# Patient Record
Sex: FEMALE | Race: WHITE | Employment: OTHER | URBAN - METROPOLITAN AREA
[De-identification: names, ages, dates, MRNs, and addresses within clinical notes are randomized per-mention and may not be internally consistent; named-entity substitution may affect disease eponyms.]

---

## 2017-01-05 PROBLEM — E11.9 DIABETES MELLITUS TYPE 2 WITHOUT RETINOPATHY (HCC): Chronic | Status: ACTIVE | Noted: 2017-01-05

## 2018-02-08 PROBLEM — H25.13 AGE-RELATED NUCLEAR CATARACT OF BOTH EYES: Chronic | Status: ACTIVE | Noted: 2018-02-08

## 2018-06-14 ENCOUNTER — HOSPITAL ENCOUNTER (OUTPATIENT)
Age: 68
Discharge: HOME | End: 2018-06-14
Payer: COMMERCIAL

## 2018-06-14 DIAGNOSIS — E11.9 TYPE 2 DIABETES MELLITUS WITHOUT COMPLICATION, WITHOUT LONG-TERM CURRENT USE OF INSULIN (HCC): ICD-10-CM

## 2018-06-14 LAB
ALBUMIN SERPL-MCNC: 4.3 G/DL (ref 3.5–5.2)
ALP BLD-CCNC: 65 U/L (ref 35–104)
ALT SERPL-CCNC: 15 U/L (ref 0–32)
ANION GAP SERPL CALCULATED.3IONS-SCNC: 13 MMOL/L (ref 7–16)
AST SERPL-CCNC: 19 U/L (ref 0–31)
BASOPHILS ABSOLUTE: 0.06 E9/L (ref 0–0.2)
BASOPHILS RELATIVE PERCENT: 1.2 % (ref 0–2)
BILIRUB SERPL-MCNC: 0.3 MG/DL (ref 0–1.2)
BUN BLDV-MCNC: 16 MG/DL (ref 8–23)
CALCIUM SERPL-MCNC: 9.9 MG/DL (ref 8.6–10.2)
CHLORIDE BLD-SCNC: 103 MMOL/L (ref 98–107)
CHOLESTEROL, TOTAL: 200 MG/DL (ref 0–199)
CO2: 26 MMOL/L (ref 22–29)
CREAT SERPL-MCNC: 1 MG/DL (ref 0.5–1)
CREATININE URINE: 127 MG/DL (ref 29–226)
EOSINOPHILS ABSOLUTE: 0.41 E9/L (ref 0.05–0.5)
EOSINOPHILS RELATIVE PERCENT: 8.2 % (ref 0–6)
GFR AFRICAN AMERICAN: >60
GFR NON-AFRICAN AMERICAN: 55 ML/MIN/1.73
GLUCOSE BLD-MCNC: 103 MG/DL (ref 74–109)
HBA1C MFR BLD: 6.2 % (ref 4.8–5.9)
HCT VFR BLD CALC: 41.9 % (ref 34–48)
HDLC SERPL-MCNC: 73 MG/DL
HEMOGLOBIN: 14.1 G/DL (ref 11.5–15.5)
IMMATURE GRANULOCYTES #: 0.01 E9/L
IMMATURE GRANULOCYTES %: 0.2 % (ref 0–5)
LDL CHOLESTEROL CALCULATED: 112 MG/DL (ref 0–99)
LYMPHOCYTES ABSOLUTE: 1.78 E9/L (ref 1.5–4)
LYMPHOCYTES RELATIVE PERCENT: 35.4 % (ref 20–42)
MCH RBC QN AUTO: 30.1 PG (ref 26–35)
MCHC RBC AUTO-ENTMCNC: 33.7 % (ref 32–34.5)
MCV RBC AUTO: 89.5 FL (ref 80–99.9)
MICROALBUMIN UR-MCNC: <12 MG/L
MICROALBUMIN/CREAT UR-RTO: ABNORMAL (ref 0–30)
MONOCYTES ABSOLUTE: 0.38 E9/L (ref 0.1–0.95)
MONOCYTES RELATIVE PERCENT: 7.6 % (ref 2–12)
NEUTROPHILS ABSOLUTE: 2.39 E9/L (ref 1.8–7.3)
NEUTROPHILS RELATIVE PERCENT: 47.4 % (ref 43–80)
PDW BLD-RTO: 12.4 FL (ref 11.5–15)
PLATELET # BLD: 309 E9/L (ref 130–450)
PMV BLD AUTO: 9.9 FL (ref 7–12)
POTASSIUM SERPL-SCNC: 4.5 MMOL/L (ref 3.5–5)
RBC # BLD: 4.68 E12/L (ref 3.5–5.5)
SODIUM BLD-SCNC: 142 MMOL/L (ref 132–146)
T4 FREE: 1.6 NG/DL (ref 0.93–1.7)
TOTAL PROTEIN: 6.9 G/DL (ref 6.4–8.3)
TRIGL SERPL-MCNC: 74 MG/DL (ref 0–149)
TSH SERPL DL<=0.05 MIU/L-ACNC: 3.9 UIU/ML (ref 0.27–4.2)
VLDLC SERPL CALC-MCNC: 15 MG/DL
WBC # BLD: 5 E9/L (ref 4.5–11.5)

## 2018-06-14 PROCEDURE — 82570 ASSAY OF URINE CREATININE: CPT

## 2018-06-14 PROCEDURE — 84439 ASSAY OF FREE THYROXINE: CPT

## 2018-06-14 PROCEDURE — 80061 LIPID PANEL: CPT

## 2018-06-14 PROCEDURE — 82044 UR ALBUMIN SEMIQUANTITATIVE: CPT

## 2018-06-14 PROCEDURE — 83036 HEMOGLOBIN GLYCOSYLATED A1C: CPT

## 2018-06-14 PROCEDURE — 84443 ASSAY THYROID STIM HORMONE: CPT

## 2018-06-14 PROCEDURE — 80053 COMPREHEN METABOLIC PANEL: CPT

## 2018-06-14 PROCEDURE — 85025 COMPLETE CBC W/AUTO DIFF WBC: CPT

## 2018-06-14 PROCEDURE — 36415 COLL VENOUS BLD VENIPUNCTURE: CPT

## 2018-09-27 ENCOUNTER — HOSPITAL ENCOUNTER (OUTPATIENT)
Dept: PHYSICAL THERAPY | Age: 68
Setting detail: THERAPIES SERIES
Discharge: HOME | End: 2018-09-27
Payer: COMMERCIAL

## 2018-09-27 PROCEDURE — 97161 PT EVAL LOW COMPLEX 20 MIN: CPT

## 2018-09-27 PROCEDURE — G8982 BODY POS GOAL STATUS: HCPCS

## 2018-09-27 PROCEDURE — G8981 BODY POS CURRENT STATUS: HCPCS

## 2018-09-27 PROCEDURE — G0283 ELEC STIM OTHER THAN WOUND: HCPCS

## 2018-09-27 NOTE — PROGRESS NOTES
Capsular pattern   AROM LUE (degrees)  LUE General AROM: Shoulder flexion 150* Abd 130*   Joint Mobility  ROM LUE: General restriction of GH and scapular joint mobility     Strength RUE  Comment: 4 yto 4+/5   Strength LUE  Comment: 4 to 4+/5   Tone RLE  RLE Tone: Normotonic  Tone LLE  LLE Tone: Normotonic  Motor Control  Gross Motor?: WFL                                           Assessment   Conditions Requiring Skilled Therapeutic Intervention  Body structures, Functions, Activity limitations: Decreased functional mobility ; Decreased ROM  Assessment: Acute left shoulder pain with restricted mobility Pain appears consistent with Impingement type problem, most likely Tendonitis. Prognosis: Fair;Good  Decision Making: Low Complexity         Plan   Plan  Times per week: 2-3  Plan weeks: 5  Current Treatment Recommendations: Strengthening, Home Exercise Program, Manual Therapy - Soft Tissue Mobilization, ROM, Functional Mobility Training, Modalities    G-Code  PT G-Codes  Functional Limitation: Changing and maintaining body position  Changing and Maintaining Body Position Current Status (): At least 20 percent but less than 40 percent impaired, limited or restricted  Changing and Maintaining Body Position Goal Status ():  At least 1 percent but less than 20 percent impaired, limited or restricted    OutComes Score                                           Goals          Therapy Time   Individual Concurrent Group Co-treatment   Time In 0930         Time Out 1030         Minutes 60         Timed Code Treatment Minutes: [address], PT

## 2018-09-28 ENCOUNTER — HOSPITAL ENCOUNTER (OUTPATIENT)
Dept: PHYSICAL THERAPY | Age: 68
Setting detail: THERAPIES SERIES
Discharge: HOME | End: 2018-09-28
Payer: COMMERCIAL

## 2018-09-28 PROCEDURE — 97035 APP MDLTY 1+ULTRASOUND EA 15: CPT

## 2018-09-28 PROCEDURE — G0283 ELEC STIM OTHER THAN WOUND: HCPCS

## 2018-10-05 ENCOUNTER — HOSPITAL ENCOUNTER (OUTPATIENT)
Dept: PHYSICAL THERAPY | Age: 68
Setting detail: THERAPIES SERIES
Discharge: HOME | End: 2018-10-05
Payer: COMMERCIAL

## 2018-10-05 PROCEDURE — G0283 ELEC STIM OTHER THAN WOUND: HCPCS

## 2018-10-05 PROCEDURE — 97035 APP MDLTY 1+ULTRASOUND EA 15: CPT

## 2018-10-08 ENCOUNTER — HOSPITAL ENCOUNTER (OUTPATIENT)
Dept: PHYSICAL THERAPY | Age: 68
Setting detail: THERAPIES SERIES
Discharge: HOME | End: 2018-10-08
Payer: COMMERCIAL

## 2018-10-08 PROCEDURE — 97110 THERAPEUTIC EXERCISES: CPT

## 2018-10-08 PROCEDURE — 97035 APP MDLTY 1+ULTRASOUND EA 15: CPT

## 2018-10-08 PROCEDURE — G0283 ELEC STIM OTHER THAN WOUND: HCPCS

## 2018-10-09 ENCOUNTER — HOSPITAL ENCOUNTER (OUTPATIENT)
Dept: PHYSICAL THERAPY | Age: 68
Setting detail: THERAPIES SERIES
Discharge: HOME | End: 2018-10-09
Payer: COMMERCIAL

## 2018-10-09 PROCEDURE — 97035 APP MDLTY 1+ULTRASOUND EA 15: CPT

## 2018-10-09 PROCEDURE — G0283 ELEC STIM OTHER THAN WOUND: HCPCS

## 2018-10-09 PROCEDURE — 97110 THERAPEUTIC EXERCISES: CPT

## 2018-10-15 ENCOUNTER — HOSPITAL ENCOUNTER (OUTPATIENT)
Dept: PHYSICAL THERAPY | Age: 68
Setting detail: THERAPIES SERIES
Discharge: HOME | End: 2018-10-15
Payer: COMMERCIAL

## 2018-10-15 PROCEDURE — G0283 ELEC STIM OTHER THAN WOUND: HCPCS

## 2018-10-15 PROCEDURE — 97033 APP MDLTY 1+IONTPHRSIS EA 15: CPT

## 2018-10-18 ENCOUNTER — HOSPITAL ENCOUNTER (OUTPATIENT)
Dept: PHYSICAL THERAPY | Age: 68
Setting detail: THERAPIES SERIES
Discharge: HOME | End: 2018-10-18
Payer: COMMERCIAL

## 2018-10-18 PROCEDURE — 97110 THERAPEUTIC EXERCISES: CPT

## 2018-10-18 PROCEDURE — G0283 ELEC STIM OTHER THAN WOUND: HCPCS

## 2018-10-23 ENCOUNTER — HOSPITAL ENCOUNTER (OUTPATIENT)
Dept: PHYSICAL THERAPY | Age: 68
Setting detail: THERAPIES SERIES
Discharge: HOME | End: 2018-10-23
Payer: COMMERCIAL

## 2018-10-23 PROCEDURE — G0283 ELEC STIM OTHER THAN WOUND: HCPCS

## 2018-10-23 PROCEDURE — 97110 THERAPEUTIC EXERCISES: CPT

## 2018-10-25 ENCOUNTER — HOSPITAL ENCOUNTER (OUTPATIENT)
Dept: PHYSICAL THERAPY | Age: 68
Setting detail: THERAPIES SERIES
Discharge: HOME | End: 2018-10-25
Payer: COMMERCIAL

## 2018-10-25 PROCEDURE — 97110 THERAPEUTIC EXERCISES: CPT

## 2018-10-25 PROCEDURE — G0283 ELEC STIM OTHER THAN WOUND: HCPCS

## 2018-10-25 NOTE — PROGRESS NOTES
stretching and capsular mobilization         Electronically signed by:  Ema Mosqueda, [address] [facility]

## 2018-10-30 ENCOUNTER — HOSPITAL ENCOUNTER (OUTPATIENT)
Dept: PHYSICAL THERAPY | Age: 68
Setting detail: THERAPIES SERIES
Discharge: HOME | End: 2018-10-30
Payer: COMMERCIAL

## 2018-10-30 PROCEDURE — G8982 BODY POS GOAL STATUS: HCPCS

## 2018-10-30 PROCEDURE — G8981 BODY POS CURRENT STATUS: HCPCS

## 2018-10-30 PROCEDURE — 97110 THERAPEUTIC EXERCISES: CPT

## 2018-10-30 PROCEDURE — G0283 ELEC STIM OTHER THAN WOUND: HCPCS

## 2018-11-01 ENCOUNTER — HOSPITAL ENCOUNTER (OUTPATIENT)
Dept: PHYSICAL THERAPY | Age: 68
Setting detail: THERAPIES SERIES
Discharge: HOME | End: 2018-11-01
Payer: COMMERCIAL

## 2018-11-01 PROCEDURE — G0283 ELEC STIM OTHER THAN WOUND: HCPCS

## 2018-11-01 PROCEDURE — 97110 THERAPEUTIC EXERCISES: CPT

## 2018-11-06 ENCOUNTER — HOSPITAL ENCOUNTER (OUTPATIENT)
Dept: PHYSICAL THERAPY | Age: 68
Setting detail: THERAPIES SERIES
Discharge: HOME | End: 2018-11-06
Payer: COMMERCIAL

## 2018-11-06 PROCEDURE — G0283 ELEC STIM OTHER THAN WOUND: HCPCS

## 2018-11-06 PROCEDURE — 97110 THERAPEUTIC EXERCISES: CPT

## 2018-11-08 ENCOUNTER — APPOINTMENT (OUTPATIENT)
Dept: PHYSICAL THERAPY | Age: 68
End: 2018-11-08
Payer: COMMERCIAL

## 2018-11-09 ENCOUNTER — HOSPITAL ENCOUNTER (OUTPATIENT)
Dept: PHYSICAL THERAPY | Age: 68
Setting detail: THERAPIES SERIES
End: 2018-11-09
Payer: COMMERCIAL

## 2018-11-12 ENCOUNTER — HOSPITAL ENCOUNTER (OUTPATIENT)
Dept: PHYSICAL THERAPY | Age: 68
Setting detail: THERAPIES SERIES
Discharge: HOME | End: 2018-11-12
Payer: COMMERCIAL

## 2018-11-12 PROCEDURE — 97110 THERAPEUTIC EXERCISES: CPT

## 2018-11-12 PROCEDURE — G0283 ELEC STIM OTHER THAN WOUND: HCPCS

## 2018-11-14 ENCOUNTER — HOSPITAL ENCOUNTER (OUTPATIENT)
Dept: PHYSICAL THERAPY | Age: 68
Setting detail: THERAPIES SERIES
Discharge: HOME | End: 2018-11-14
Payer: COMMERCIAL

## 2018-11-14 PROCEDURE — 97110 THERAPEUTIC EXERCISES: CPT

## 2018-11-14 PROCEDURE — G0283 ELEC STIM OTHER THAN WOUND: HCPCS

## 2018-11-14 NOTE — PROGRESS NOTES
[facility]  Phone: [phone] Fax: [phone]       Physical Therapy Daily Treatment Note  Date:  2018    Patient Name:  Brielle Villasenor    :  1950  MRN: [id number]    Restrictions/Precautions:    Diagnosis:  Left Shoulder Pain   Treatment Diagnosis:    Insurance/Certification information:  [employer]   Referring Physician:  Dr Page Quinonez of care signed (Y/N):    Visit# / total visits:  14/  Pain level: /10  Time In: 930     Time Out: 1030         Subjective:     Exercises:  Exercise/Equipment Resistance/Repetitions Other comments     UBE 8 min F/B           Prayer stretch with ball   10 reps      Modified doorway stretch   5 reps      Supine Thoracic/capsule stretch   3 min       Flex Band Horizontal adduction stretch  5 reps                                                                                                     Other Therapeutic Activities:      Home Exercise Program:      Manual Treatments: [facility] Joint ROM/Mobilization  Emphasis on posterior capsule     Modalities:                         Interferential Estim 20 min with heat              Timed Code Treatment Minutes: 30    Total Treatment Minutes: 40    Treatment/Activity Tolerance:  [x] Patient tolerated treatment well [] Patient limited by fatigue  [] Patient limited by pain  [] Patient limited by other medical complications  [x] Other: Improved GH mobility with less acute pain post injection     Prognosis: [x] Good [x] Fair  [] Poor    Patient Requires Follow-up: [x] Yes  [] No    Plan:   [x] Continue per plan of care [] Alter current plan (see comments)  [] Plan of care initiated [] Hold pending MD visit [] Discharge  Plan for Next Session:        Electronically signed by:  Matt Reeves, [address] [facility]

## 2018-11-19 ENCOUNTER — HOSPITAL ENCOUNTER (OUTPATIENT)
Dept: PHYSICAL THERAPY | Age: 68
Setting detail: THERAPIES SERIES
Discharge: HOME | End: 2018-11-19
Payer: COMMERCIAL

## 2018-11-19 PROCEDURE — 97110 THERAPEUTIC EXERCISES: CPT

## 2018-11-19 PROCEDURE — G0283 ELEC STIM OTHER THAN WOUND: HCPCS

## 2018-11-27 ENCOUNTER — HOSPITAL ENCOUNTER (OUTPATIENT)
Dept: PHYSICAL THERAPY | Age: 68
Setting detail: THERAPIES SERIES
Discharge: HOME | End: 2018-11-27
Payer: COMMERCIAL

## 2018-11-27 PROCEDURE — 97110 THERAPEUTIC EXERCISES: CPT

## 2018-11-27 PROCEDURE — G0283 ELEC STIM OTHER THAN WOUND: HCPCS

## 2018-11-30 ENCOUNTER — HOSPITAL ENCOUNTER (OUTPATIENT)
Dept: PHYSICAL THERAPY | Age: 68
Setting detail: THERAPIES SERIES
Discharge: HOME | End: 2018-11-30
Payer: COMMERCIAL

## 2018-11-30 PROCEDURE — G0283 ELEC STIM OTHER THAN WOUND: HCPCS

## 2018-11-30 PROCEDURE — 97110 THERAPEUTIC EXERCISES: CPT

## 2018-11-30 NOTE — PROGRESS NOTES
[facility]  Phone: [phone] Fax: [phone]       Physical Therapy Daily Treatment Note  Date:  2018    Patient Name:  Stu Li    :  1950  MRN: [id number]    Restrictions/Precautions:    Diagnosis:  Left Shoulder Pain   Treatment Diagnosis:    Insurance/Certification information:  [facility]   Referring Physician:  Dr Ela Delgadillo of care signed (Y/N):    Visit# / total visits:  16/  Pain level: /10  Time In: 0800     Time Out: 0850         Subjective:     Exercises:  Exercise/Equipment Resistance/Repetitions Other comments     UBE 8 min F/B           Prayer stretch with ball   10 reps      Modified doorway stretch   5 reps      Supine Thoracic/capsule stretch   3 min       Flex Band Horizontal adduction stretch  5 reps             T band pull down   Blue  10 reps       Upright row 10 reps       Wall Push up   10 reps       Towel stretch for IR  10 reps                                                               Other Therapeutic Activities:      Home Exercise Program:      Manual Treatments: [address] Joint ROM/Mobilization  Emphasis on posterior capsule     Modalities:                         Interferential Estim 20 min with heat              Timed Code Treatment Minutes: 30    Total Treatment Minutes: 40    Treatment/Activity Tolerance:  [x] Patient tolerated treatment well [] Patient limited by fatigue  [] Patient limited by pain  [] Patient limited by other medical complications  [x] Other    Prognosis: [x] Good [x] Fair  [] Poor    Patient Requires Follow-up: [x] Yes  [] No    Plan:   [x] Continue per plan of care [] Alter current plan (see comments)  [] Plan of care initiated [] Hold pending MD visit [] Discharge  Plan for Next Session:        Electronically signed by:  Karen Toscano, 311 [facility]

## 2018-12-05 ENCOUNTER — HOSPITAL ENCOUNTER (OUTPATIENT)
Dept: PHYSICAL THERAPY | Age: 68
Setting detail: THERAPIES SERIES
Discharge: HOME | End: 2018-12-05
Payer: COMMERCIAL

## 2018-12-05 PROCEDURE — 97110 THERAPEUTIC EXERCISES: CPT

## 2018-12-05 PROCEDURE — G0283 ELEC STIM OTHER THAN WOUND: HCPCS

## 2018-12-07 ENCOUNTER — HOSPITAL ENCOUNTER (OUTPATIENT)
Dept: PHYSICAL THERAPY | Age: 68
Setting detail: THERAPIES SERIES
Discharge: HOME | End: 2018-12-07
Payer: COMMERCIAL

## 2018-12-07 PROCEDURE — G0283 ELEC STIM OTHER THAN WOUND: HCPCS

## 2018-12-07 PROCEDURE — 97110 THERAPEUTIC EXERCISES: CPT

## 2018-12-10 ENCOUNTER — HOSPITAL ENCOUNTER (OUTPATIENT)
Dept: PHYSICAL THERAPY | Age: 68
Setting detail: THERAPIES SERIES
Discharge: HOME | End: 2018-12-10
Payer: COMMERCIAL

## 2018-12-10 PROCEDURE — 97110 THERAPEUTIC EXERCISES: CPT

## 2018-12-10 PROCEDURE — G0283 ELEC STIM OTHER THAN WOUND: HCPCS

## 2018-12-12 ENCOUNTER — HOSPITAL ENCOUNTER (OUTPATIENT)
Dept: PHYSICAL THERAPY | Age: 68
Setting detail: THERAPIES SERIES
Discharge: HOME | End: 2018-12-12
Payer: COMMERCIAL

## 2018-12-12 PROCEDURE — G8981 BODY POS CURRENT STATUS: HCPCS

## 2018-12-12 PROCEDURE — 97110 THERAPEUTIC EXERCISES: CPT

## 2018-12-12 PROCEDURE — G0283 ELEC STIM OTHER THAN WOUND: HCPCS

## 2018-12-12 PROCEDURE — G8982 BODY POS GOAL STATUS: HCPCS

## 2018-12-12 PROCEDURE — G8983 BODY POS D/C STATUS: HCPCS

## 2018-12-27 ENCOUNTER — HOSPITAL ENCOUNTER (OUTPATIENT)
Age: 68
Discharge: HOME | End: 2018-12-27
Payer: COMMERCIAL

## 2018-12-27 LAB
CHOLESTEROL, TOTAL: 207 MG/DL (ref 0–199)
HBA1C MFR BLD: 6.1 % (ref 4–5.6)
HDLC SERPL-MCNC: 82 MG/DL
LDL CHOLESTEROL CALCULATED: 104 MG/DL (ref 0–99)
TRIGL SERPL-MCNC: 106 MG/DL (ref 0–149)
VLDLC SERPL CALC-MCNC: 21 MG/DL

## 2018-12-27 PROCEDURE — 36415 COLL VENOUS BLD VENIPUNCTURE: CPT

## 2018-12-27 PROCEDURE — 83036 HEMOGLOBIN GLYCOSYLATED A1C: CPT

## 2018-12-27 PROCEDURE — 80061 LIPID PANEL: CPT

## 2019-01-25 ENCOUNTER — OFFICE VISIT (OUTPATIENT)
Dept: FAMILY MEDICINE CLINIC | Age: 69
End: 2019-01-25
Payer: MEDICARE

## 2019-01-25 VITALS
HEART RATE: 64 BPM | TEMPERATURE: 97 F | HEIGHT: 67 IN | OXYGEN SATURATION: 98 % | SYSTOLIC BLOOD PRESSURE: 130 MMHG | DIASTOLIC BLOOD PRESSURE: 76 MMHG | RESPIRATION RATE: 16 BRPM | WEIGHT: 152 LBS | BODY MASS INDEX: 23.86 KG/M2

## 2019-01-25 DIAGNOSIS — K59.00 CONSTIPATION, UNSPECIFIED CONSTIPATION TYPE: ICD-10-CM

## 2019-01-25 DIAGNOSIS — E11.9 TYPE 2 DIABETES MELLITUS WITHOUT COMPLICATION, WITHOUT LONG-TERM CURRENT USE OF INSULIN (HCC): ICD-10-CM

## 2019-01-25 DIAGNOSIS — Z12.31 SCREENING MAMMOGRAM, ENCOUNTER FOR: ICD-10-CM

## 2019-01-25 DIAGNOSIS — E03.8 HYPOTHYROIDISM DUE TO HASHIMOTO'S THYROIDITIS: Primary | ICD-10-CM

## 2019-01-25 DIAGNOSIS — E78.5 HYPERLIPIDEMIA, UNSPECIFIED HYPERLIPIDEMIA TYPE: ICD-10-CM

## 2019-01-25 DIAGNOSIS — E06.3 HYPOTHYROIDISM DUE TO HASHIMOTO'S THYROIDITIS: Primary | ICD-10-CM

## 2019-01-25 PROCEDURE — 99203 OFFICE O/P NEW LOW 30 MIN: CPT | Performed by: FAMILY MEDICINE

## 2019-01-25 RX ORDER — POLYETHYLENE GLYCOL 3350 17 G/17G
17 POWDER, FOR SOLUTION ORAL PRN
COMMUNITY
End: 2019-08-19

## 2019-01-25 ASSESSMENT — ENCOUNTER SYMPTOMS
SORE THROAT: 0
BACK PAIN: 0
SHORTNESS OF BREATH: 0
SINUS PRESSURE: 0
CONSTIPATION: 0
EYE PAIN: 0
DIARRHEA: 0
ABDOMINAL PAIN: 0
COUGH: 0

## 2019-01-25 ASSESSMENT — PATIENT HEALTH QUESTIONNAIRE - PHQ9
SUM OF ALL RESPONSES TO PHQ9 QUESTIONS 1 & 2: 0
SUM OF ALL RESPONSES TO PHQ QUESTIONS 1-9: 0
2. FEELING DOWN, DEPRESSED OR HOPELESS: 0
1. LITTLE INTEREST OR PLEASURE IN DOING THINGS: 0
SUM OF ALL RESPONSES TO PHQ QUESTIONS 1-9: 0

## 2019-02-14 PROBLEM — H52.4 MYOPIA OF BOTH EYES WITH ASTIGMATISM AND PRESBYOPIA: Status: ACTIVE | Noted: 2019-02-14

## 2019-02-14 PROBLEM — H35.372 EPIRETINAL MEMBRANE (ERM) OF LEFT EYE: Status: ACTIVE | Noted: 2019-02-14

## 2019-02-14 PROBLEM — H52.13 MYOPIA OF BOTH EYES WITH ASTIGMATISM AND PRESBYOPIA: Status: ACTIVE | Noted: 2019-02-14

## 2019-02-14 PROBLEM — H52.203 MYOPIA OF BOTH EYES WITH ASTIGMATISM AND PRESBYOPIA: Status: ACTIVE | Noted: 2019-02-14

## 2019-07-03 ENCOUNTER — HOSPITAL ENCOUNTER (OUTPATIENT)
Age: 69
Discharge: HOME | End: 2019-07-03
Payer: MEDICARE

## 2019-07-03 DIAGNOSIS — E03.9 PRIMARY HYPOTHYROIDISM: ICD-10-CM

## 2019-07-03 DIAGNOSIS — E11.9 TYPE 2 DIABETES MELLITUS WITHOUT COMPLICATION, WITHOUT LONG-TERM CURRENT USE OF INSULIN (HCC): ICD-10-CM

## 2019-07-03 LAB
ALBUMIN SERPL-MCNC: 4.3 G/DL (ref 3.5–5.2)
ALP BLD-CCNC: 66 U/L (ref 35–104)
ALT SERPL-CCNC: 17 U/L (ref 0–32)
ANION GAP SERPL CALCULATED.3IONS-SCNC: 10 MMOL/L (ref 7–16)
AST SERPL-CCNC: 23 U/L (ref 0–31)
BILIRUB SERPL-MCNC: 0.2 MG/DL (ref 0–1.2)
BUN BLDV-MCNC: 13 MG/DL (ref 8–23)
CALCIUM SERPL-MCNC: 9.4 MG/DL (ref 8.6–10.2)
CHLORIDE BLD-SCNC: 105 MMOL/L (ref 98–107)
CHOLESTEROL, TOTAL: 217 MG/DL (ref 0–199)
CO2: 28 MMOL/L (ref 22–29)
CREAT SERPL-MCNC: 1 MG/DL (ref 0.5–1)
CREATININE URINE: 92 MG/DL (ref 29–226)
GFR AFRICAN AMERICAN: >60
GFR NON-AFRICAN AMERICAN: 55 ML/MIN/1.73
GLUCOSE BLD-MCNC: 111 MG/DL (ref 74–99)
HBA1C MFR BLD: 6.2 % (ref 4–5.6)
HDLC SERPL-MCNC: 73 MG/DL
LDL CHOLESTEROL CALCULATED: 122 MG/DL (ref 0–99)
MICROALBUMIN UR-MCNC: <12 MG/L
MICROALBUMIN/CREAT UR-RTO: ABNORMAL (ref 0–30)
POTASSIUM SERPL-SCNC: 5 MMOL/L (ref 3.5–5)
SODIUM BLD-SCNC: 143 MMOL/L (ref 132–146)
T4 FREE: 1.41 NG/DL (ref 0.93–1.7)
TOTAL PROTEIN: 6.9 G/DL (ref 6.4–8.3)
TRIGL SERPL-MCNC: 111 MG/DL (ref 0–149)
TSH SERPL DL<=0.05 MIU/L-ACNC: 3.98 UIU/ML (ref 0.27–4.2)
VLDLC SERPL CALC-MCNC: 22 MG/DL

## 2019-07-03 PROCEDURE — 83036 HEMOGLOBIN GLYCOSYLATED A1C: CPT

## 2019-07-03 PROCEDURE — 82044 UR ALBUMIN SEMIQUANTITATIVE: CPT

## 2019-07-03 PROCEDURE — 80053 COMPREHEN METABOLIC PANEL: CPT

## 2019-07-03 PROCEDURE — 84443 ASSAY THYROID STIM HORMONE: CPT

## 2019-07-03 PROCEDURE — 36415 COLL VENOUS BLD VENIPUNCTURE: CPT

## 2019-07-03 PROCEDURE — 82570 ASSAY OF URINE CREATININE: CPT

## 2019-07-03 PROCEDURE — 84439 ASSAY OF FREE THYROXINE: CPT

## 2019-07-03 PROCEDURE — 80061 LIPID PANEL: CPT

## 2019-07-11 PROBLEM — E78.5 DYSLIPIDEMIA: Status: ACTIVE | Noted: 2019-07-11

## 2019-07-25 ENCOUNTER — OFFICE VISIT (OUTPATIENT)
Dept: FAMILY MEDICINE CLINIC | Age: 69
End: 2019-07-25
Payer: MEDICARE

## 2019-07-25 VITALS
DIASTOLIC BLOOD PRESSURE: 70 MMHG | RESPIRATION RATE: 16 BRPM | SYSTOLIC BLOOD PRESSURE: 122 MMHG | HEART RATE: 67 BPM | WEIGHT: 150 LBS | BODY MASS INDEX: 23.49 KG/M2 | OXYGEN SATURATION: 99 %

## 2019-07-25 DIAGNOSIS — Z12.11 SCREENING FOR COLON CANCER: ICD-10-CM

## 2019-07-25 DIAGNOSIS — Z23 NEED FOR VACCINATION AGAINST STREPTOCOCCUS PNEUMONIAE: ICD-10-CM

## 2019-07-25 DIAGNOSIS — Z00.00 ROUTINE GENERAL MEDICAL EXAMINATION AT A HEALTH CARE FACILITY: Primary | ICD-10-CM

## 2019-07-25 PROCEDURE — 90732 PPSV23 VACC 2 YRS+ SUBQ/IM: CPT | Performed by: FAMILY MEDICINE

## 2019-07-25 PROCEDURE — G0009 ADMIN PNEUMOCOCCAL VACCINE: HCPCS | Performed by: FAMILY MEDICINE

## 2019-07-25 PROCEDURE — G0439 PPPS, SUBSEQ VISIT: HCPCS | Performed by: FAMILY MEDICINE

## 2019-07-25 ASSESSMENT — PATIENT HEALTH QUESTIONNAIRE - PHQ9
SUM OF ALL RESPONSES TO PHQ QUESTIONS 1-9: 0
SUM OF ALL RESPONSES TO PHQ QUESTIONS 1-9: 0

## 2019-07-25 ASSESSMENT — LIFESTYLE VARIABLES
HOW OFTEN DURING THE LAST YEAR HAVE YOU FOUND THAT YOU WERE NOT ABLE TO STOP DRINKING ONCE YOU HAD STARTED: 0
HAS A RELATIVE, FRIEND, DOCTOR, OR ANOTHER HEALTH PROFESSIONAL EXPRESSED CONCERN ABOUT YOUR DRINKING OR SUGGESTED YOU CUT DOWN: 0
HOW OFTEN DO YOU HAVE A DRINK CONTAINING ALCOHOL: 4
HOW OFTEN DURING THE LAST YEAR HAVE YOU HAD A FEELING OF GUILT OR REMORSE AFTER DRINKING: 0
HOW OFTEN DO YOU HAVE SIX OR MORE DRINKS ON ONE OCCASION: 0
AUDIT TOTAL SCORE: 4
HOW MANY STANDARD DRINKS CONTAINING ALCOHOL DO YOU HAVE ON A TYPICAL DAY: 0
AUDIT-C TOTAL SCORE: 4
HOW OFTEN DURING THE LAST YEAR HAVE YOU BEEN UNABLE TO REMEMBER WHAT HAPPENED THE NIGHT BEFORE BECAUSE YOU HAD BEEN DRINKING: 0
HAVE YOU OR SOMEONE ELSE BEEN INJURED AS A RESULT OF YOUR DRINKING: 0
HOW OFTEN DURING THE LAST YEAR HAVE YOU NEEDED AN ALCOHOLIC DRINK FIRST THING IN THE MORNING TO GET YOURSELF GOING AFTER A NIGHT OF HEAVY DRINKING: 0
HOW OFTEN DURING THE LAST YEAR HAVE YOU FAILED TO DO WHAT WAS NORMALLY EXPECTED FROM YOU BECAUSE OF DRINKING: 0

## 2019-07-25 NOTE — PROGRESS NOTES
Medicare Annual Wellness Visit  Name: Michael Funk Date: 2019   MRN: [id number] Sex: Female   Age: 76 y.o. Ethnicity: Non-/Non    : 1950 Race: Tierney Blanton is here for Medicare AWV    Screenings for behavioral, psychosocial and functional/safety risks, and cognitive dysfunction are all negative except as indicated below. These results, as well as other patient data from the [address] form, are documented in Flowsheets linked to this Encounter. No Known Allergies  Prior to Visit Medications    Medication Sig Taking? Authorizing Provider   metFORMIN (GLUCOPHAGE) 1000 MG tablet Take 1 tablet by mouth 2 times daily (with meals) Yes Dipesh Harris MD   levothyroxine (SYNTHROID) 50 MCG tablet TAKE ONE TABLET BY MOUTH ON MONDAY,TUESDAY,AND WEDNESDAY Yes Dipesh Harris MD   levothyroxine (SYNTHROID) 75 MCG tablet Take 1 tablet by mouth See Admin Instructions Thursday- Yes Dipesh Harris MD   lovastatin (MEVACOR) 40 MG tablet Take 1 tablet by mouth daily Yes Dipesh Harris MD   polyethylene glycol (GLYCOLAX) powder Take 17 g by mouth as needed Yes Historical Provider, MD   Psyllium (METAMUCIL FIBER PO) Take by mouth Yes Historical Provider, MD   blood glucose test strips (FREESTYLE LITE) strip 1 each by In Vitro route 2 times daily As needed.  Yes Dipesh Harrsi MD   FREESTYLE LANCETS MISC 1 each by Does not apply route 2 times daily Yes Dipesh Harris MD   Lancet Device MISC 1 Device by Does not apply route once for 1 dose Yes Dipesh Harris MD   Blood Glucose Monitoring Suppl (FREESTYLE FREEDOM LITE) w/Device KIT once Yes Dipesh Harris MD   B Complex Vitamins (VITAMIN B COMPLEX PO) Take by mouth 2 times daily  Yes Historical Provider, MD   Cholecalciferol (VITAMIN D3) 1000 UNITS TABS Take by mouth daily Yes Historical Provider, MD     Past Medical History:   Diagnosis Date    Actinic keratosis     Diabetes ([facility] [address]     Hyperlipidemia     Hypothyroidism     Lichen sclerosus     Lichen sclerosus     Rotator cuff impingement syndrome     Spinal arthritis     Spinal stenosis     Vertigo      Past Surgical History:   Procedure Laterality Date    BREAST BIOPSY      Stereotactic    COLONOSCOPY      DILATION AND CURETTAGE OF UTERUS      HYSTERECTOMY      THYROIDECTOMY, PARTIAL  01/2008    TUBAL LIGATION       Family History   Problem Relation Age of Onset    Cancer Mother         pancreatic    Diabetes Father     Cancer Father         Prostate    Heart Failure Father     Stroke Father         due to medical intervention    Cancer Sister         Breast CA x2    Kidney Disease Maternal Grandmother     Heart Disease Maternal Grandfather     Heart Attack Maternal Grandfather     Cancer Paternal Grandmother     Heart Failure Paternal Grandfather     Other Other         No [facility]       CareTeam (Including outside providers/suppliers regularly involved in providing care):   Patient Care Team:  Suly Fox DO as PCP - General (Family Medicine)  Fanta Schmidt MD as PCP - [address] [place], [place] [address]DO as PCP - [facility] Empaneled Provider    Wt Readings from Last 3 Encounters:   07/25/19 150 lb (68 kg)   07/11/19 151 lb (68.5 kg)   01/25/19 152 lb (68.9 kg)     Vitals:    07/25/19 0842   BP: 122/70   Pulse: 67   Resp: 16   SpO2: 99%   Weight: 150 lb (68 kg)     Body mass index is 23.49 kg/m². Based upon direct observation of the patient, evaluation of cognition reveals recent and remote memory intact. Patient's complete Health Risk Assessment and screening values have been reviewed and are found in Flowsheets. The following problems were reviewed today and where indicated follow up appointments were made and/or referrals ordered.     Positive Risk Factor Screenings with Interventions:     Hearing/Vision:  No exam data present  Hearing/Vision  Do you or your family notice any trouble with your hearing?: (!) Yes  Do you have difficulty driving, watching TV, or doing any of your daily activities because of your eyesight?: No  Have you had an eye exam within the past year?: Yes  Hearing/Vision Interventions:  · Hearing concerns:  patient declines any further evaluation/treatment for hearing issues    Safety:  Safety  Do you have working smoke detectors?: Yes  Have all throw rugs been removed or fastened?: (!) No  Do you have non-slip mats or surfaces in all bathtubs/showers?: Yes  Do all of your stairways have a railing or banister?: Yes  Are your doorways, halls and stairs free of clutter?: Yes  Do you always fasten your seatbelt when you are in a car?: Yes  Safety Interventions:  · Home safety tips provided    Personalized Preventive Plan   Current Health Maintenance Status    There is no immunization history on file for this patient. Health Maintenance   Topic Date Due    Hepatitis C screen  1950    DTaP/Tdap/Td vaccine (1 - Tdap) 12/05/1969    Shingles Vaccine (1 of 2) 12/05/2000    Colon cancer screen colonoscopy  12/05/2000    Annual Wellness Visit (AWV)  12/05/2013    Pneumococcal 65+ years Vaccine (1 of 2 - PCV13) 12/05/2015    Flu vaccine (1) 09/01/2019    Diabetic foot exam  01/03/2020    Diabetic retinal exam  02/14/2020    A1C test (Diabetic or Prediabetic)  07/03/2020    Diabetic microalbuminuria test  07/03/2020    Lipid screen  07/03/2020    TSH testing  07/03/2020    Breast cancer screen  04/01/2021    DEXA (modify frequency per FRAX score)  Completed     Recommendations for Preventive Services Due: see orders and patient instructions/AVS.  .   Recommended screening schedule for the next 5-10 years is provided to the patient in written form: see Patient Instructions/AVS.

## 2019-07-26 ENCOUNTER — TELEPHONE (OUTPATIENT)
Dept: SURGERY | Age: 69
End: 2019-07-26

## 2019-08-19 ENCOUNTER — OFFICE VISIT (OUTPATIENT)
Dept: SURGERY | Age: 69
End: 2019-08-19
Payer: MEDICARE

## 2019-08-19 ENCOUNTER — TELEPHONE (OUTPATIENT)
Dept: SURGERY | Age: 69
End: 2019-08-19

## 2019-08-19 VITALS
DIASTOLIC BLOOD PRESSURE: 60 MMHG | TEMPERATURE: 97.6 F | RESPIRATION RATE: 16 BRPM | OXYGEN SATURATION: 97 % | WEIGHT: 153 LBS | HEIGHT: 66 IN | BODY MASS INDEX: 24.59 KG/M2 | SYSTOLIC BLOOD PRESSURE: 124 MMHG | HEART RATE: 69 BPM

## 2019-08-19 DIAGNOSIS — R19.4 CHANGE IN BOWEL HABITS: Primary | ICD-10-CM

## 2019-08-19 DIAGNOSIS — K59.00 CONSTIPATION, UNSPECIFIED CONSTIPATION TYPE: ICD-10-CM

## 2019-08-19 DIAGNOSIS — Z86.010 HX OF ADENOMATOUS COLONIC POLYPS: ICD-10-CM

## 2019-08-19 PROCEDURE — 99204 OFFICE O/P NEW MOD 45 MIN: CPT | Performed by: SURGERY

## 2019-08-19 RX ORDER — POLYETHYLENE GLYCOL 3350, SODIUM CHLORIDE, SODIUM BICARBONATE, POTASSIUM CHLORIDE 420; 11.2; 5.72; 1.48 G/4L; G/4L; G/4L; G/4L
4000 POWDER, FOR SOLUTION ORAL ONCE
Qty: 4000 ML | Refills: 0 | Status: SHIPPED | OUTPATIENT
Start: 2019-08-19 | End: 2019-08-19

## 2019-08-19 ASSESSMENT — ENCOUNTER SYMPTOMS
EYE REDNESS: 0
CONSTIPATION: 1
CHOKING: 0
VOMITING: 0
NAUSEA: 0
BLOOD IN STOOL: 0
DIARRHEA: 0
BACK PAIN: 1
ABDOMINAL PAIN: 1
COUGH: 0
WHEEZING: 0
COLOR CHANGE: 0
CHEST TIGHTNESS: 0
EYE ITCHING: 1
ABDOMINAL DISTENTION: 1
ANAL BLEEDING: 0
SHORTNESS OF BREATH: 0
EYE PAIN: 0
EYE DISCHARGE: 0

## 2019-08-19 NOTE — TELEPHONE ENCOUNTER
Scheduled pt for Outpatient Colonoscopy with Dr. Tameka James on 10/1/2019 at 9:00AM. Pt needs to arrive at [facility] at 8:00AM. Confirmed procedure date time and location with patient. Sent instruction sheet home with patient.     Electronically signed by Suyapa Davalos on 8/19/19 at 12:35 PM

## 2019-08-19 NOTE — PATIENT INSTRUCTIONS
heart or kidney condition   Treatment with certain medicines, including aspirin and other drugs with anticoagulant or blood-thinning properties   Prior abdominal surgery or radiation treatments   Active colitis , diverticulitis , or other acute bowel disease   Previous treatment with radiation therapy     Be sure to discuss these risks with your doctor before the procedure. What to Expect   Prior to Procedure   Your doctor will likely do the following:   Physical exam   Health history   Review of medicines   Test your stool for hidden blood (called \"occult blood\")     Your colon must be completely clean before the procedure. Any stool left in the intestine will block the view. This preparation may start several days before the procedure. Follow your doctor's instructions. Leading up to your procedure:   Talk to your doctor about your medicines. You may be asked to stop taking some medicines up to one week before the procedure, like:   Anti-inflammatory drugs (e.g., aspirin )   Blood thinners like clopidogrel (Plavix) or warfarin (Coumadin)   Iron supplements or vitamins containing iron   The day or days before your procedure, go on a clear liquid diet (clear broth, clear juice, clear jello) with no red coloring  Do not eat or drink anything after midnight. Wear comfortable clothing. If you have diabetes, ask your doctor if you need to adjust your diabetes medicine on the day prior to your procedure and the day of your procedure. Arrange for a ride home after the procedure. Anesthesia   You will receive intravenous sedation medicine for the procedure so you will not feel anything during the procedure. Description of the Procedure   You will lie on your left side with knees bent and drawn up toward your chest. The colonoscope will be slowly inserted through the rectum and into the bowel. The colonoscope will inject air into the colon.  A small attached video camera will allow the doctor to view the

## 2019-08-19 NOTE — PROGRESS NOTES
Subjective:      Patient ID: Yossi Juarez is a 76 y.o. female. HPI  76 yr old female referred by Dr. Stanley Cole for evaluation for colonoscopy. Last colonoscopy 6/15--sessile polyps removed. Pt reports off and on discomfort in her abdomen due to constipation. Reports she is very gassy. Denies blood in stool. Denies unintentional weight loss. Denies nausea or vomiting;  Occasional heartburn/reflux--depending on diet. Reports very irregular BMs. States she can go for several days without a BM and then will have multiple BMs in one day. States she is trying to regulate her bowels with miralax. Denies family hx of colon cancer.     Past Medical History:   Diagnosis Date    Actinic keratosis     Diabetes [facility] [address]     Hyperlipidemia     Hypothyroidism     Lichen sclerosus     Lichen sclerosus     Rotator cuff impingement syndrome     Spinal arthritis     Spinal stenosis     Vertigo        Past Surgical History:   Procedure Laterality Date    BREAST BIOPSY      Stereotactic    COLONOSCOPY      DILATION AND CURETTAGE OF UTERUS      HYSTERECTOMY      THYROIDECTOMY, PARTIAL  01/2008    TUBAL LIGATION         Current Outpatient Medications   Medication Sig Dispense Refill    Polyethylene Glycol 3350 (MIRALAX PO) Take by mouth Indications: PRN      metFORMIN (GLUCOPHAGE) 1000 MG tablet Take 1 tablet by mouth 2 times daily (with meals) 180 tablet 3    levothyroxine (SYNTHROID) 50 MCG tablet TAKE ONE TABLET BY MOUTH ON MONDAY,TUESDAY,AND WEDNESDAY 30 tablet 5    levothyroxine (SYNTHROID) 75 MCG tablet Take 1 tablet by mouth See Admin Instructions Thursday-Sunday 30 tablet 5    lovastatin (MEVACOR) 40 MG tablet Take 1 tablet by mouth daily (Patient taking differently: Take 20 mg by mouth daily ) 90 tablet 3    Psyllium (METAMUCIL FIBER PO) Take by mouth      B Complex Vitamins (VITAMIN B COMPLEX PO) Take by mouth 2 times daily       Cholecalciferol (VITAMIN D3) 1000 UNITS TABS Take by mouth daily light. Conjunctivae and EOM are normal. Right eye exhibits no discharge. Left eye exhibits no discharge. No scleral icterus. Neck: Normal range of motion. Neck supple. Cardiovascular: Normal rate, regular rhythm and normal heart sounds. No murmur heard. Pulmonary/Chest: Effort normal and breath sounds normal. No stridor. No respiratory distress. She has no wheezes. Abdominal: Soft. Bowel sounds are normal. She exhibits no distension and no mass. There is no tenderness. There is no rebound and no guarding. No hernia. Musculoskeletal: Normal range of motion. Neurological: She is alert and oriented to person, place, and time. Skin: Skin is warm and dry. She is not diaphoretic. Psychiatric: She has a normal mood and affect. Her behavior is normal. Judgment and thought content normal.       Assessment:      Change in bowel habits  Constipation  Hx of sessile polyps on prior colonoscopy      Plan:      Recommend colonoscopy. The patient was explained the risks/benefits/alternatives/expected outcomes of the procedure. The patient was explained the risks of the procedure, including, but not limited to, the risk of reaction to the anesthesia medicine and the risk of perforation requiring further surgery. The patient was informed that they may require biopsy or polypectomy. These procedures may increase the risk of complication. All questions were answered. The patient verbalized understanding and agreed to proceed.           Debbie Berger MD

## 2019-09-30 RX ORDER — POLYETHYLENE GLYCOL 3350, SODIUM CHLORIDE, SODIUM BICARBONATE, POTASSIUM CHLORIDE 420; 11.2; 5.72; 1.48 G/4L; G/4L; G/4L; G/4L
POWDER, FOR SOLUTION ORAL
Refills: 0 | COMMUNITY
Start: 2019-08-19 | End: 2020-01-16

## 2019-10-01 ENCOUNTER — ANESTHESIA (OUTPATIENT)
Dept: ENDOSCOPY | Age: 69
End: 2019-10-01
Payer: MEDICARE

## 2019-10-01 ENCOUNTER — HOSPITAL ENCOUNTER (OUTPATIENT)
Age: 69
Setting detail: OUTPATIENT SURGERY
Discharge: HOME | End: 2019-10-01
Attending: SURGERY | Admitting: SURGERY
Payer: MEDICARE

## 2019-10-01 ENCOUNTER — ANESTHESIA EVENT (OUTPATIENT)
Dept: ENDOSCOPY | Age: 69
End: 2019-10-01
Payer: MEDICARE

## 2019-10-01 VITALS
DIASTOLIC BLOOD PRESSURE: 61 MMHG | SYSTOLIC BLOOD PRESSURE: 112 MMHG | OXYGEN SATURATION: 98 % | RESPIRATION RATE: 15 BRPM

## 2019-10-01 VITALS
TEMPERATURE: 96.7 F | DIASTOLIC BLOOD PRESSURE: 64 MMHG | WEIGHT: 150 LBS | HEIGHT: 66 IN | SYSTOLIC BLOOD PRESSURE: 105 MMHG | RESPIRATION RATE: 16 BRPM | BODY MASS INDEX: 24.11 KG/M2 | HEART RATE: 67 BPM | OXYGEN SATURATION: 98 %

## 2019-10-01 DIAGNOSIS — Z01.818 PREOP TESTING: Primary | ICD-10-CM

## 2019-10-01 LAB — METER GLUCOSE: 104 MG/DL (ref 74–99)

## 2019-10-01 PROCEDURE — 7100000011 HC PHASE II RECOVERY - ADDTL 15 MIN: Performed by: SURGERY

## 2019-10-01 PROCEDURE — 7100000010 HC PHASE II RECOVERY - FIRST 15 MIN: Performed by: SURGERY

## 2019-10-01 PROCEDURE — 45378 DIAGNOSTIC COLONOSCOPY: CPT | Performed by: SURGERY

## 2019-10-01 PROCEDURE — 3609009500 HC COLONOSCOPY DIAGNOSTIC OR SCREENING: Performed by: SURGERY

## 2019-10-01 PROCEDURE — 82962 GLUCOSE BLOOD TEST: CPT

## 2019-10-01 PROCEDURE — 3700000000 HC ANESTHESIA ATTENDED CARE: Performed by: SURGERY

## 2019-10-01 PROCEDURE — 2709999900 HC NON-CHARGEABLE SUPPLY: Performed by: SURGERY

## 2019-10-01 PROCEDURE — 6360000002 HC RX W HCPCS: Performed by: NURSE ANESTHETIST, CERTIFIED REGISTERED

## 2019-10-01 PROCEDURE — 3700000001 HC ADD 15 MINUTES (ANESTHESIA): Performed by: SURGERY

## 2019-10-01 PROCEDURE — 2580000003 HC RX 258: Performed by: SURGERY

## 2019-10-01 RX ORDER — 0.9 % SODIUM CHLORIDE 0.9 %
10 VIAL (ML) INJECTION PRN
Status: DISCONTINUED | OUTPATIENT
Start: 2019-10-01 | End: 2019-10-01 | Stop reason: HOSPADM

## 2019-10-01 RX ORDER — PROPOFOL 10 MG/ML
INJECTION, EMULSION INTRAVENOUS PRN
Status: DISCONTINUED | OUTPATIENT
Start: 2019-10-01 | End: 2019-10-01 | Stop reason: SDUPTHER

## 2019-10-01 RX ORDER — SODIUM CHLORIDE 0.9 % (FLUSH) 0.9 %
10 SYRINGE (ML) INJECTION EVERY 12 HOURS SCHEDULED
Status: DISCONTINUED | OUTPATIENT
Start: 2019-10-01 | End: 2019-10-01 | Stop reason: HOSPADM

## 2019-10-01 RX ORDER — SODIUM CHLORIDE 9 MG/ML
INJECTION, SOLUTION INTRAVENOUS CONTINUOUS
Status: DISCONTINUED | OUTPATIENT
Start: 2019-10-01 | End: 2019-10-01 | Stop reason: HOSPADM

## 2019-10-01 RX ORDER — MIDAZOLAM HYDROCHLORIDE 1 MG/ML
INJECTION INTRAMUSCULAR; INTRAVENOUS PRN
Status: DISCONTINUED | OUTPATIENT
Start: 2019-10-01 | End: 2019-10-01 | Stop reason: SDUPTHER

## 2019-10-01 RX ADMIN — PROPOFOL 250 MG: 10 INJECTION, EMULSION INTRAVENOUS at 08:52

## 2019-10-01 RX ADMIN — SODIUM CHLORIDE: 9 INJECTION, SOLUTION INTRAVENOUS at 08:22

## 2019-10-01 RX ADMIN — MIDAZOLAM HYDROCHLORIDE 2 MG: 1 INJECTION, SOLUTION INTRAMUSCULAR; INTRAVENOUS at 08:52

## 2019-10-01 ASSESSMENT — PAIN - FUNCTIONAL ASSESSMENT: PAIN_FUNCTIONAL_ASSESSMENT: 0-10

## 2019-10-01 ASSESSMENT — PAIN SCALES - GENERAL
PAINLEVEL_OUTOF10: 4
PAINLEVEL_OUTOF10: 2

## 2019-10-01 ASSESSMENT — PAIN DESCRIPTION - ORIENTATION: ORIENTATION: OTHER (COMMENT)

## 2019-10-01 ASSESSMENT — PAIN DESCRIPTION - FREQUENCY: FREQUENCY: CONTINUOUS

## 2019-10-01 ASSESSMENT — PAIN DESCRIPTION - LOCATION: LOCATION: ABDOMEN

## 2019-10-01 ASSESSMENT — PAIN DESCRIPTION - PAIN TYPE: TYPE: ACUTE PAIN

## 2019-10-01 ASSESSMENT — PAIN DESCRIPTION - ONSET: ONSET: AWAKENED FROM SLEEP

## 2019-10-01 ASSESSMENT — PAIN DESCRIPTION - DESCRIPTORS
DESCRIPTORS: CRAMPING;DULL
DESCRIPTORS: CRAMPING;DULL

## 2020-01-09 ENCOUNTER — HOSPITAL ENCOUNTER (OUTPATIENT)
Age: 70
Discharge: HOME | End: 2020-01-09
Payer: MEDICARE

## 2020-01-09 LAB
ANION GAP SERPL CALCULATED.3IONS-SCNC: 11 MMOL/L (ref 7–16)
BUN BLDV-MCNC: 12 MG/DL (ref 8–23)
CALCIUM SERPL-MCNC: 9.8 MG/DL (ref 8.6–10.2)
CHLORIDE BLD-SCNC: 103 MMOL/L (ref 98–107)
CHOLESTEROL, TOTAL: 204 MG/DL (ref 0–199)
CO2: 28 MMOL/L (ref 22–29)
CREAT SERPL-MCNC: 1 MG/DL (ref 0.5–1)
GFR AFRICAN AMERICAN: >60
GFR NON-AFRICAN AMERICAN: 55 ML/MIN/1.73
GLUCOSE BLD-MCNC: 106 MG/DL (ref 74–99)
HBA1C MFR BLD: 6.3 % (ref 4–5.6)
HDLC SERPL-MCNC: 76 MG/DL
LDL CHOLESTEROL CALCULATED: 103 MG/DL (ref 0–99)
POTASSIUM SERPL-SCNC: 4.5 MMOL/L (ref 3.5–5)
SODIUM BLD-SCNC: 142 MMOL/L (ref 132–146)
TRIGL SERPL-MCNC: 125 MG/DL (ref 0–149)
VLDLC SERPL CALC-MCNC: 25 MG/DL

## 2020-01-09 PROCEDURE — 36415 COLL VENOUS BLD VENIPUNCTURE: CPT

## 2020-01-09 PROCEDURE — 83036 HEMOGLOBIN GLYCOSYLATED A1C: CPT

## 2020-01-09 PROCEDURE — 80048 BASIC METABOLIC PNL TOTAL CA: CPT

## 2020-01-09 PROCEDURE — 80061 LIPID PANEL: CPT

## 2020-03-25 PROBLEM — E78.5 HYPERLIPIDEMIA: Status: RESOLVED | Noted: 2019-01-25 | Resolved: 2020-03-24

## 2020-05-28 ENCOUNTER — OFFICE VISIT (OUTPATIENT)
Dept: FAMILY MEDICINE CLINIC | Age: 70
End: 2020-05-28
Payer: MEDICARE

## 2020-05-28 VITALS
OXYGEN SATURATION: 99 % | RESPIRATION RATE: 16 BRPM | BODY MASS INDEX: 23.5 KG/M2 | HEIGHT: 66 IN | TEMPERATURE: 98.3 F | DIASTOLIC BLOOD PRESSURE: 80 MMHG | SYSTOLIC BLOOD PRESSURE: 124 MMHG | HEART RATE: 53 BPM | WEIGHT: 146.2 LBS

## 2020-05-28 PROCEDURE — 99214 OFFICE O/P EST MOD 30 MIN: CPT | Performed by: FAMILY MEDICINE

## 2020-05-28 ASSESSMENT — ENCOUNTER SYMPTOMS
DIARRHEA: 0
COUGH: 0
EYE PAIN: 0
SHORTNESS OF BREATH: 0
ABDOMINAL PAIN: 0
SINUS PRESSURE: 0
SORE THROAT: 0
CONSTIPATION: 0
BACK PAIN: 0

## 2020-05-28 ASSESSMENT — PATIENT HEALTH QUESTIONNAIRE - PHQ9
2. FEELING DOWN, DEPRESSED OR HOPELESS: 0
SUM OF ALL RESPONSES TO PHQ9 QUESTIONS 1 & 2: 0
SUM OF ALL RESPONSES TO PHQ QUESTIONS 1-9: 0
1. LITTLE INTEREST OR PLEASURE IN DOING THINGS: 0
SUM OF ALL RESPONSES TO PHQ QUESTIONS 1-9: 0

## 2020-05-28 NOTE — PROGRESS NOTES
Smokeless tobacco: Never Used   Substance and Sexual Activity    Alcohol use: Yes     Alcohol/week: 7.0 standard drinks     Types: 7 Glasses of wine per week     Comment: daily    Drug use: No    Sexual activity: None   Lifestyle    Physical activity     Days per week: None     Minutes per session: None    Stress: None   Relationships    Social connections     Talks on phone: None     Gets together: None     Attends [employer] service: None     Active member of club or organization: None     Attends meetings of clubs or organizations: None     Relationship status: None    Intimate partner violence     Fear of current or ex partner: None     Emotionally abused: None     Physically abused: None     Forced sexual activity: None   Other Topics Concern    None   Social History Narrative    None       Family History   Problem Relation Age of Onset    Cancer Mother         pancreatic    Diabetes Father     Cancer Father         Prostate    Heart Failure Father     Stroke Father         due to medical intervention    Cancer Sister         Breast CA x2    Kidney Disease Maternal Grandmother     Heart Disease Maternal Grandfather     Heart Attack Maternal Grandfather     Cancer Paternal Grandmother     Heart Failure Paternal Grandfather     Other Other         No [facility]          Current Outpatient Medications   Medication Sig Dispense Refill    ciclopirox (PENLAC) 8 % solution Apply topically nightly. 1 Bottle 2    metFORMIN (GLUCOPHAGE) 1000 MG tablet Take 1 tablet by mouth 2 times daily (with meals) 180 tablet 3    lovastatin (MEVACOR) 40 MG tablet Take 1 tablet by mouth daily 90 tablet 3    levothyroxine (SYNTHROID) 50 MCG tablet TAKE 1 TABLET BY MOUTH ON MONDAY, TUESDAY, AND WEDNESDAY.  30 tablet 3    Blood Glucose Monitoring Suppl (FREESTYLE FREEDOM LITE) w/Device KIT 1 kit by Does not apply route daily      blood glucose test strips (FREESTYLE LITE) strip 1 each by In Vitro route daily As HDL 82 12/27/2018     Lab Results   Component Value Date    LDLCALC 103 (H) 01/09/2020    LDLCALC 122 (H) 07/03/2019    LDLCALC 104 (H) 12/27/2018       Lab Results   Component Value Date    LABA1C 6.3 (H) 01/09/2020    LABA1C 6.2 (H) 07/03/2019    LABA1C 6.1 (H) 12/27/2018     Lab Results   Component Value Date    LABMICR <12.0 07/03/2019    LDLCALC 103 (H) 01/09/2020    CREATININE 1.0 01/09/2020       ASSESSMENT/PLAN:     Diagnosis Orders   1. Type 2 diabetes mellitus without complication, without long-term current use of insulin ([facility] [address]     2. Hypothyroidism, unspecified type     3. Hyperlipidemia, unspecified hyperlipidemia type     4. Screening mammogram, encounter for  KRISTIAN DIGITAL SCREEN W CAD BILATERAL     Diabetes and thyroid under good control follows with endocrinology. Will check lipid panel with next labs for endocrinology. Currently under good control continue current therapy. Mammogram ordered as well. Return in 1 year for annual wellness visit or sooner if needed    Problem list reviewed andsimplified/updated  HM reviewed today and counseled as appropriate    Call or go to ED immediately if symptoms worsen or persist.  Future Appointments   Date Time Provider Javon Luis   6/9/2020 10:15 AM [address] [place] [address]   8/3/2020  9:00 AM MD SUSIE Vazquez   6/1/2021  8:00 AM DO [facility]     Or sooner if necessary.       Educational materials and/or homeexercises printed for patient's review and were included in patient instructions on his/her After Visit Summary and given to patient at the end of visit.       Counseled regarding above diagnosis, including possible risks and complications,  especially if left uncontrolled.     Counseled regarding the possible side effects, risks, benefits and alternatives to treatment; patient and/or guardian verbalizes understanding, agrees,feels comfortable with and wishes to proceed with above treatment plan.     Advised patient to call [facility] new medication issues, and read all Rx info from pharmacy to assure aware of all possible risks and side effects of medication before taking.     Reviewed age and gender appropriate health screening exams and vaccinations. Advised patient regarding importance of keeping up with recommended health maintenance and toschedule as soon as possible if overdue, as this is important in assessing for undiagnosed pathology, especially cancer, as well as protecting against potentially harmful/life threatening disease.          Patient and/or guardian verbalizes understanding and agrees with above counseling, assessment and plan.     All questions answered. Luther Pavon DO  5/28/20    NOTE: This report was transcribed using voice recognition software.  Every effort was made to ensure accuracy; however, inadvertent computerized transcription errors may be present

## 2020-07-28 ENCOUNTER — HOSPITAL ENCOUNTER (OUTPATIENT)
Age: 70
Discharge: HOME | End: 2020-07-28
Payer: MEDICARE

## 2020-07-28 LAB
ALBUMIN SERPL-MCNC: 4.4 G/DL (ref 3.5–5.2)
ALP BLD-CCNC: 71 U/L (ref 35–104)
ALT SERPL-CCNC: 14 U/L (ref 0–32)
ANION GAP SERPL CALCULATED.3IONS-SCNC: 10 MMOL/L (ref 7–16)
AST SERPL-CCNC: 18 U/L (ref 0–31)
BILIRUB SERPL-MCNC: 0.3 MG/DL (ref 0–1.2)
BUN BLDV-MCNC: 19 MG/DL (ref 8–23)
CALCIUM SERPL-MCNC: 9.8 MG/DL (ref 8.6–10.2)
CHLORIDE BLD-SCNC: 105 MMOL/L (ref 98–107)
CHOLESTEROL, TOTAL: 200 MG/DL (ref 0–199)
CO2: 28 MMOL/L (ref 22–29)
CREAT SERPL-MCNC: 1.2 MG/DL (ref 0.5–1)
CREATININE URINE: 89 MG/DL (ref 29–226)
GFR AFRICAN AMERICAN: 54
GFR NON-AFRICAN AMERICAN: 44 ML/MIN/1.73
GLUCOSE BLD-MCNC: 105 MG/DL (ref 74–99)
HBA1C MFR BLD: 6.4 % (ref 4–5.6)
HDLC SERPL-MCNC: 79 MG/DL
LDL CHOLESTEROL CALCULATED: 103 MG/DL (ref 0–99)
MICROALBUMIN UR-MCNC: <12 MG/L
MICROALBUMIN/CREAT UR-RTO: ABNORMAL (ref 0–30)
POTASSIUM SERPL-SCNC: 4.9 MMOL/L (ref 3.5–5)
SODIUM BLD-SCNC: 143 MMOL/L (ref 132–146)
T4 FREE: 1.36 NG/DL (ref 0.93–1.7)
TOTAL PROTEIN: 6.7 G/DL (ref 6.4–8.3)
TRIGL SERPL-MCNC: 89 MG/DL (ref 0–149)
TSH SERPL DL<=0.05 MIU/L-ACNC: 4.1 UIU/ML (ref 0.27–4.2)
VLDLC SERPL CALC-MCNC: 18 MG/DL

## 2020-07-28 PROCEDURE — 84439 ASSAY OF FREE THYROXINE: CPT

## 2020-07-28 PROCEDURE — 82570 ASSAY OF URINE CREATININE: CPT

## 2020-07-28 PROCEDURE — 80061 LIPID PANEL: CPT

## 2020-07-28 PROCEDURE — 84443 ASSAY THYROID STIM HORMONE: CPT

## 2020-07-28 PROCEDURE — 82044 UR ALBUMIN SEMIQUANTITATIVE: CPT

## 2020-07-28 PROCEDURE — 36415 COLL VENOUS BLD VENIPUNCTURE: CPT

## 2020-07-28 PROCEDURE — 80053 COMPREHEN METABOLIC PANEL: CPT

## 2020-07-28 PROCEDURE — 83036 HEMOGLOBIN GLYCOSYLATED A1C: CPT

## 2020-10-14 ENCOUNTER — OFFICE VISIT (OUTPATIENT)
Dept: FAMILY MEDICINE CLINIC | Age: 70
End: 2020-10-14
Payer: MEDICARE

## 2020-10-14 VITALS
TEMPERATURE: 97 F | RESPIRATION RATE: 20 BRPM | SYSTOLIC BLOOD PRESSURE: 119 MMHG | WEIGHT: 145 LBS | HEART RATE: 77 BPM | DIASTOLIC BLOOD PRESSURE: 76 MMHG | BODY MASS INDEX: 22.76 KG/M2 | HEIGHT: 67 IN

## 2020-10-14 PROBLEM — H35.3111 EARLY DRY STAGE NONEXUDATIVE AGE-RELATED MACULAR DEGENERATION OF RIGHT EYE: Status: ACTIVE | Noted: 2020-10-14

## 2020-10-14 PROCEDURE — G0439 PPPS, SUBSEQ VISIT: HCPCS | Performed by: FAMILY MEDICINE

## 2020-10-14 RX ORDER — VIT C/E/ZN/COPPR/LUTEIN/ZEAXAN 250MG-90MG
CAPSULE ORAL
COMMUNITY
Start: 2020-10-14 | End: 2021-10-12

## 2020-10-14 ASSESSMENT — LIFESTYLE VARIABLES
AUDIT-C TOTAL SCORE: 3
AUDIT TOTAL SCORE: 3
HOW OFTEN DO YOU HAVE SIX OR MORE DRINKS ON ONE OCCASION: 0
HAVE YOU OR SOMEONE ELSE BEEN INJURED AS A RESULT OF YOUR DRINKING: 0
HOW OFTEN DURING THE LAST YEAR HAVE YOU HAD A FEELING OF GUILT OR REMORSE AFTER DRINKING: 0
HOW OFTEN DURING THE LAST YEAR HAVE YOU FAILED TO DO WHAT WAS NORMALLY EXPECTED FROM YOU BECAUSE OF DRINKING: 0
HOW OFTEN DURING THE LAST YEAR HAVE YOU NEEDED AN ALCOHOLIC DRINK FIRST THING IN THE MORNING TO GET YOURSELF GOING AFTER A NIGHT OF HEAVY DRINKING: 0
HOW OFTEN DURING THE LAST YEAR HAVE YOU BEEN UNABLE TO REMEMBER WHAT HAPPENED THE NIGHT BEFORE BECAUSE YOU HAD BEEN DRINKING: 0
HOW OFTEN DO YOU HAVE A DRINK CONTAINING ALCOHOL: 3
HOW MANY STANDARD DRINKS CONTAINING ALCOHOL DO YOU HAVE ON A TYPICAL DAY: 0
HAS A RELATIVE, FRIEND, DOCTOR, OR ANOTHER HEALTH PROFESSIONAL EXPRESSED CONCERN ABOUT YOUR DRINKING OR SUGGESTED YOU CUT DOWN: 0
HOW OFTEN DURING THE LAST YEAR HAVE YOU FOUND THAT YOU WERE NOT ABLE TO STOP DRINKING ONCE YOU HAD STARTED: 0

## 2020-10-14 ASSESSMENT — PATIENT HEALTH QUESTIONNAIRE - PHQ9
2. FEELING DOWN, DEPRESSED OR HOPELESS: 0
SUM OF ALL RESPONSES TO PHQ QUESTIONS 1-9: 0
SUM OF ALL RESPONSES TO PHQ QUESTIONS 1-9: 0
2. FEELING DOWN, DEPRESSED OR HOPELESS: 0
SUM OF ALL RESPONSES TO PHQ QUESTIONS 1-9: 0
1. LITTLE INTEREST OR PLEASURE IN DOING THINGS: 0
1. LITTLE INTEREST OR PLEASURE IN DOING THINGS: 0
SUM OF ALL RESPONSES TO PHQ9 QUESTIONS 1 & 2: 0
SUM OF ALL RESPONSES TO PHQ9 QUESTIONS 1 & 2: 0
SUM OF ALL RESPONSES TO PHQ QUESTIONS 1-9: 0

## 2020-10-14 NOTE — PROGRESS NOTES
Cholecalciferol (VITAMIN D3) 1000 UNITS TABS Take by mouth daily Yes Historical Provider, MD         Past Medical History:   Diagnosis Date    Actinic keratosis     Diabetes ([address]     Hyperlipidemia     Hypothyroidism     Lichen sclerosus     Rotator cuff impingement syndrome     Spinal arthritis     Spinal stenosis     Vertigo        Past Surgical History:   Procedure Laterality Date    BREAST BIOPSY      Stereotactic    COLONOSCOPY      COLONOSCOPY  10/01/2019    normal--christopher    COLONOSCOPY N/A 10/1/2019    COLONOSCOPY DIAGNOSTIC performed by Alexys Lombardo MD at [address], PARTIAL  01/2008    TUBAL LIGATION           Family History   Problem Relation Age of Onset    Cancer Mother         pancreatic    Diabetes Father     Cancer Father         Prostate    Heart Failure Father     Stroke Father         due to medical intervention    Cancer Sister         Breast CA x2    Kidney Disease Maternal Grandmother     Heart Disease Maternal Grandfather     Heart Attack Maternal Grandfather     Cancer Paternal Grandmother     Heart Failure Paternal Grandfather     Other Other         No [facility]       CareTeam (Including outside providers/suppliers regularly involved in providing care):   Patient Care Team:  Nydia Fierro DO as PCP - General (Family Medicine)  Newton Nuñez MD as PCP - [address] [place] [address]  as PCP - [facility] Empaneled Provider    Wt Readings from Last 3 Encounters:   10/14/20 145 lb (65.8 kg)   08/03/20 146 lb 14.4 oz (66.6 kg)   05/28/20 146 lb 3.2 oz (66.3 kg)     Vitals:    10/14/20 0816   BP: 119/76   Pulse: 77   Resp: 20   Temp: 97 °F (36.1 °C)   TempSrc: Temporal   Weight: 145 lb (65.8 kg)   Height: 5' 6.5\" (1.689 m)     Body mass index is 23.05 kg/m².     Based upon direct observation of the patient, evaluation of cognition reveals recent and remote memory intact. Patient's complete Health Risk Assessment and screening values have been reviewed and are found in Flowsheets. The following problems were reviewed today and where indicated follow up appointments were made and/or referrals ordered. Positive Risk Factor Screenings with Interventions:       General Health and ACP:  General  In general, how would you say your health is?: Very Good  In the past 7 days, have you experienced any of the following?  New or Increased Pain, New or Increased Fatigue, Loneliness, Social Isolation, Stress or Anger?: None of These  Do you get the social and emotional support that you need?: Yes  Do you have a Living Will?: Yes  Advance Directives     [facility] Will ACP-Advance Directive ACP-Power of     Not on File Not on File Filed [address] Risk Interventions:  · doing well    Hearing/Vision:  No exam data present  Hearing/Vision  Do you or your family notice any trouble with your hearing?: (!) Yes  Do you have difficulty driving, watching TV, or doing any of your daily activities because of your eyesight?: No  Have you had an eye exam within the past year?: Yes  Hearing/Vision Interventions:  · Hearing concerns:  patient declines any further evaluation/treatment for hearing issues, she did see audiology in [facility], she did try hearing aids     Safety:  Safety  Do you have working smoke detectors?: Yes  Have all throw rugs been removed or fastened?: (!) No  Do you have non-slip mats or surfaces in all bathtubs/showers?: Yes  Do all of your stairways have a railing or banister?: Yes  Are your doorways, halls and stairs free of clutter?: Yes  Do you always fasten your seatbelt when you are in a car?: Yes  Safety Interventions:  · Home safety tips provided    Personalized Preventive Plan   Current Health Maintenance Status  Immunization History   Administered Date(s) Administered    Pneumococcal Conjugate 13-valent (Jun Dickinson) 04/19/2018  Pneumococcal Polysaccharide ([id number]) 07/25/2019        Health Maintenance   Topic Date Due    Hepatitis C screen  1950    DTaP/Tdap/Td vaccine (1 - Tdap) 12/05/1969    Shingles Vaccine (1 of 2) 12/05/2000    Annual Wellness Visit (AWV)  05/29/2019    Diabetic retinal exam  02/14/2020    Flu vaccine (1) 09/01/2020    A1C test (Diabetic or Prediabetic)  07/28/2021    Diabetic microalbuminuria test  07/28/2021    Lipid screen  07/28/2021    TSH testing  07/28/2021    Diabetic foot exam  08/03/2021    Breast cancer screen  06/09/2022    Colon cancer screen colonoscopy  10/01/2024    DEXA (modify frequency per FRAX score)  Completed    Pneumococcal 65+ years Vaccine  Completed    Hepatitis A vaccine  Aged Out    Hib vaccine  Aged Out    Meningococcal (ACWY) vaccine  Aged Out     Recommendations for [facility] Due: see orders and patient instructions/AVS.  . Recommended screening schedule for the next 5-10 years is provided to the patient in written form: see Patient Instructions/AVS.    Dmitry Feldman was seen today for gi problem and medicare awv.     Diagnoses and all orders for this visit:    Routine general medical examination at a health care facility    Generalized abdominal pain  -     CT ABDOMEN PELVIS WO CONTRAST Additional Contrast? Oral; Future    Constipation, unspecified constipation type  -     CT ABDOMEN PELVIS WO CONTRAST Additional Contrast? Oral; Future    Early dry stage nonexudative age-related macular degeneration of right eye

## 2020-10-14 NOTE — PATIENT INSTRUCTIONS
the most important information I should know about paroxetine? You should not use paroxetine if you are also taking pimozide or thioridazine. Do not use paroxetine within 14 days before or 14 days after you have used an MAO inhibitor, such as isocarboxazid, linezolid, methylene blue injection, phenelzine, rasagiline, selegiline, or tranylcypromine. Some young people have thoughts about suicide when first taking an antidepressant. Stay alert to changes in your mood or symptoms. Report any new or worsening symptoms to your doctor  Seek medical attention right away if you have symptoms such as: agitation, hallucinations, muscle stiffness, twitching, loss of coordination, dizziness, warmth or tingly feeling, nausea, vomiting, diarrhea, fever, sweating, tremors, racing heartbeats, or a seizure (convulsions). What is paroxetine? Paroxetine is a selective serotonin reuptake inhibitor (SSRI) antidepressant. Paroxetine is used to treat depression, including major depressive disorder. Paroxtine is also used to treat panic disorder, obsessive-compulsive disorder (OCD), anxiety disorders, post-traumatic stress disorder (PTSD), and premenstrual dysphoric disorder (PMDD). The Brisdelle brand of paroxetine is used to treat hot flashes related to menopause. Erven Deacon is not for treating any other conditions. Paroxetine may also be used for purposes not listed in this medication guide. What should I discuss with my healthcare provider before taking paroxetine? You should not use this medicine if you are allergic to paroxetine, or if you are also taking pimozide or thioridazine. Do not use an MAO inhibitor within 14 days before or 14 days after you take paroxetine. A dangerous drug interaction could occur. MAO inhibitors include isocarboxazid, linezolid, phenelzine, rasagiline, selegiline, and tranylcypromine. After you stop taking paroxetine you must wait at least 14 days before you start taking an MAO inhibitor.   Tell your doctor if you have ever had:  · heart disease, high blood pressure, or a stroke;  · liver or kidney disease;  · a bleeding or blood clotting disorder;  · seizures or epilepsy;  · bipolar disorder (manic depression), drug addiction, or suicidal thoughts;  · narrow-angle glaucoma; or  · low levels of sodium in your blood. Be sure your doctor knows if you also take stimulant medicine, opioid medicine, herbal products, or medicine for depression, mental illness, Parkinson's disease, migraine headaches, serious infections, or prevention of nausea and vomiting. These medicines may interact with paroxetine and cause a serious condition called serotonin syndrome. Some young people have thoughts about suicide when first taking an antidepressant. Your doctor should check your progress at regular visits. Your family or other caregivers should also be alert to changes in your mood or symptoms. Taking an SSRI antidepressant during pregnancy may cause serious lung problems or other complications in the baby. However, you may have a relapse of depression if you stop taking your antidepressant. Tell your doctor right away if you become pregnant. Do not start or stop taking this medicine without your doctor's advice. Do not use Brisdelle if you are pregnant. You should not breastfeed while using this medicine. Paroxetine is not approved for use by anyone younger than 25years old. How should I take paroxetine? Follow all directions on your prescription label and read all medication guides or instruction sheets. Your doctor may occasionally change your dose. Use the medicine exactly as directed. Swallow the extended-release tablet whole and do not crush, chew, or break it. Shake the oral suspension (liquid) before you measure a dose. Use the dosing syringe provided, or use a medicine dose-measuring device (not a kitchen spoon). It may take up to 4 weeks before your symptoms improve.  Keep using the medication as directed and tell your doctor if your symptoms do not improve. Do not stop using paroxetine suddenly, or you could have unpleasant withdrawal symptoms. Ask your doctor how to safely stop using paroxetine. Follow your doctor's instructions about tapering your dose. Store at room temperature away from moisture, heat, and light. What happens if I miss a dose? Take the medicine as soon as you can, but skip the missed dose if it is almost time for your next dose. Do not take two doses at one time. What happens if I overdose? Seek emergency medical attention or call the Poison Help line at 1-989.774.8695. An overdose of paroxetine can be fatal.  What should I avoid while taking paroxetine? Avoid driving or hazardous activity until you know how this medicine will affect you. Your reactions could be impaired. Ask your doctor before taking a nonsteroidal anti-inflammatory drug (NSAID) such as aspirin, ibuprofen (Advil, Motrin), naproxen (Aleve), celecoxib (Celebrex), diclofenac, indomethacin, meloxicam, and others. Using an NSAID with paroxetine may cause you to bruise or bleed easily. Drinking alcohol with this medicine can cause side effects. What are the possible side effects of paroxetine? Get emergency medical help if you have signs of an allergic reaction (hives, difficult breathing, swelling in your face or throat) or a severe skin reaction (fever, sore throat, burning eyes, skin pain, red or purple skin rash with blistering and peeling). Report any new or worsening symptoms to your doctor, such as: mood or behavior changes, anxiety, panic attacks, trouble sleeping, or if you feel impulsive, irritable, agitated, hostile, aggressive, restless, hyperactive (mentally or physically), more depressed, or have thoughts about suicide or hurting yourself.   Call your doctor at once if you have:  · racing thoughts, decreased need for sleep, unusual risk-taking behavior, feelings of extreme happiness or sadness, being --amphetamine, atomoxetine, dextroamphetamine, Adderall, Dexedrine, Evekeo, Vyvanse, and others;  · narcotic pain medicine --fentanyl, tramadol;  · medicine to treat anxiety, mood disorders, thought disorders, or mental illness --such as buspirone, lithium, other antidepressants, or antipsychotics;  · migraine headache medicine --sumatriptan, rizatriptan, zolmitriptan, and others; or  · seizure medicine --phenobarbital, phenytoin. This list is not complete. Other drugs may interact with paroxetine, including prescription and over-the-counter medicines, vitamins, and herbal products. Not all possible interactions are listed in this medication guide. Where can I get more information? Your pharmacist can provide more information about paroxetine. Remember, keep this and all other medicines out of the reach of children, never share your medicines with others, and use this medication only for the indication prescribed. Every effort has been made to ensure that the information provided by [address] [place] [address]  is accurate, up-to-date, and complete, but no guarantee is made to that effect. Drug information contained herein may be time sensitive. [facility] information has been compiled for use by healthcare practitioners and consumers in the [place] and therefore [facility] does not warrant that uses outside of the [place] are appropriate, unless specifically indicated otherwise. [facility] drug information does not endorse drugs, diagnose patients or recommend therapy. [facility] drug information is an informational resource designed to assist licensed healthcare practitioners in caring for their patients and/or to serve consumers viewing this service as a supplement to, and not a substitute for, the expertise, skill, knowledge and judgment of healthcare practitioners.  The absence of a warning for a given drug or drug combination in no way should be construed to indicate that the drug or drug combination is safe, effective or appropriate for any given patient. [facility] does not assume any responsibility for any aspect of healthcare administered with the aid of information [facility] provides. The information contained herein is not intended to cover all possible uses, directions, precautions, warnings, drug interactions, allergic reactions, or adverse effects. If you have questions about the drugs you are taking, check with your doctor, nurse or pharmacist.  Copyright 2806-7368 [address]: 27.01. Revision date: 9/19/2019. Care instructions adapted under license by [facility] ([facility]). If you have questions about a medical condition or this instruction, always ask your healthcare professional. [place] [address] any warranty or liability for your use of this information.

## 2020-10-26 ENCOUNTER — HOSPITAL ENCOUNTER (OUTPATIENT)
Dept: CT IMAGING | Age: 70
Discharge: HOME | End: 2020-10-28
Payer: MEDICARE

## 2020-10-26 PROCEDURE — 74176 CT ABD & PELVIS W/O CONTRAST: CPT

## 2020-10-26 PROCEDURE — 6360000004 HC RX CONTRAST MEDICATION: Performed by: RADIOLOGY

## 2020-10-26 RX ADMIN — IOHEXOL 50 ML: 240 INJECTION, SOLUTION INTRATHECAL; INTRAVASCULAR; INTRAVENOUS; ORAL at 08:54

## 2020-11-09 ENCOUNTER — TELEPHONE (OUTPATIENT)
Dept: FAMILY MEDICINE CLINIC | Age: 70
End: 2020-11-09

## 2020-12-08 ENCOUNTER — HOSPITAL ENCOUNTER (OUTPATIENT)
Age: 70
Discharge: HOME | End: 2020-12-08
Payer: MEDICARE

## 2020-12-08 LAB — TSH SERPL DL<=0.05 MIU/L-ACNC: 2.76 UIU/ML (ref 0.27–4.2)

## 2020-12-08 PROCEDURE — 36415 COLL VENOUS BLD VENIPUNCTURE: CPT

## 2020-12-08 PROCEDURE — 84443 ASSAY THYROID STIM HORMONE: CPT

## 2021-02-05 ENCOUNTER — HOSPITAL ENCOUNTER (OUTPATIENT)
Age: 71
Discharge: HOME | End: 2021-02-05
Payer: MEDICARE

## 2021-02-05 DIAGNOSIS — E11.9 TYPE 2 DIABETES MELLITUS WITHOUT COMPLICATION, WITHOUT LONG-TERM CURRENT USE OF INSULIN (HCC): ICD-10-CM

## 2021-02-05 LAB
ANION GAP SERPL CALCULATED.3IONS-SCNC: 9 MMOL/L (ref 7–16)
BUN BLDV-MCNC: 17 MG/DL (ref 8–23)
CALCIUM SERPL-MCNC: 9.8 MG/DL (ref 8.6–10.2)
CHLORIDE BLD-SCNC: 103 MMOL/L (ref 98–107)
CO2: 30 MMOL/L (ref 22–29)
CREAT SERPL-MCNC: 1.1 MG/DL (ref 0.5–1)
GFR AFRICAN AMERICAN: 59
GFR NON-AFRICAN AMERICAN: 49 ML/MIN/1.73
GLUCOSE BLD-MCNC: 108 MG/DL (ref 74–99)
HBA1C MFR BLD: 6.3 % (ref 4–5.6)
POTASSIUM SERPL-SCNC: 4.5 MMOL/L (ref 3.5–5)
SODIUM BLD-SCNC: 142 MMOL/L (ref 132–146)

## 2021-02-05 PROCEDURE — 83036 HEMOGLOBIN GLYCOSYLATED A1C: CPT

## 2021-02-05 PROCEDURE — 80048 BASIC METABOLIC PNL TOTAL CA: CPT

## 2021-02-05 PROCEDURE — 36415 COLL VENOUS BLD VENIPUNCTURE: CPT

## 2021-03-30 ENCOUNTER — HOSPITAL ENCOUNTER (OUTPATIENT)
Age: 71
Discharge: HOME | End: 2021-03-30
Payer: MEDICARE

## 2021-03-30 DIAGNOSIS — E03.9 PRIMARY HYPOTHYROIDISM: ICD-10-CM

## 2021-03-30 LAB
ALBUMIN SERPL-MCNC: 4.4 G/DL (ref 3.5–5.2)
ALP BLD-CCNC: 72 U/L (ref 35–104)
ALT SERPL-CCNC: 13 U/L (ref 0–32)
ANION GAP SERPL CALCULATED.3IONS-SCNC: 10 MMOL/L (ref 7–16)
AST SERPL-CCNC: 18 U/L (ref 0–31)
BILIRUB SERPL-MCNC: 0.3 MG/DL (ref 0–1.2)
BUN BLDV-MCNC: 21 MG/DL (ref 8–23)
CALCIUM SERPL-MCNC: 9.8 MG/DL (ref 8.6–10.2)
CHLORIDE BLD-SCNC: 106 MMOL/L (ref 98–107)
CO2: 28 MMOL/L (ref 22–29)
CREAT SERPL-MCNC: 1.1 MG/DL (ref 0.5–1)
GFR AFRICAN AMERICAN: 59
GFR NON-AFRICAN AMERICAN: 49 ML/MIN/1.73
GLUCOSE BLD-MCNC: 108 MG/DL (ref 74–99)
POTASSIUM SERPL-SCNC: 4.7 MMOL/L (ref 3.5–5)
SODIUM BLD-SCNC: 144 MMOL/L (ref 132–146)
T4 FREE: 1.36 NG/DL (ref 0.93–1.7)
TOTAL PROTEIN: 6.8 G/DL (ref 6.4–8.3)
TSH SERPL DL<=0.05 MIU/L-ACNC: 3.75 UIU/ML (ref 0.27–4.2)

## 2021-03-30 PROCEDURE — 84443 ASSAY THYROID STIM HORMONE: CPT

## 2021-03-30 PROCEDURE — 36415 COLL VENOUS BLD VENIPUNCTURE: CPT

## 2021-03-30 PROCEDURE — 80053 COMPREHEN METABOLIC PANEL: CPT

## 2021-03-30 PROCEDURE — 84439 ASSAY OF FREE THYROXINE: CPT

## 2021-05-25 ENCOUNTER — OFFICE VISIT (OUTPATIENT)
Dept: FAMILY MEDICINE CLINIC | Age: 71
End: 2021-05-25
Payer: MEDICARE

## 2021-05-25 VITALS
WEIGHT: 146.4 LBS | HEIGHT: 68 IN | SYSTOLIC BLOOD PRESSURE: 131 MMHG | RESPIRATION RATE: 18 BRPM | BODY MASS INDEX: 22.19 KG/M2 | HEART RATE: 62 BPM | TEMPERATURE: 97.9 F | OXYGEN SATURATION: 100 % | DIASTOLIC BLOOD PRESSURE: 72 MMHG

## 2021-05-25 DIAGNOSIS — E11.9 TYPE 2 DIABETES MELLITUS WITHOUT COMPLICATION, WITHOUT LONG-TERM CURRENT USE OF INSULIN (HCC): Primary | ICD-10-CM

## 2021-05-25 DIAGNOSIS — Z91.09 ENVIRONMENTAL ALLERGIES: ICD-10-CM

## 2021-05-25 DIAGNOSIS — N18.31 STAGE 3A CHRONIC KIDNEY DISEASE (HCC): ICD-10-CM

## 2021-05-25 PROCEDURE — 99213 OFFICE O/P EST LOW 20 MIN: CPT | Performed by: FAMILY MEDICINE

## 2021-05-25 ASSESSMENT — PATIENT HEALTH QUESTIONNAIRE - PHQ9
SUM OF ALL RESPONSES TO PHQ QUESTIONS 1-9: 0
SUM OF ALL RESPONSES TO PHQ9 QUESTIONS 1 & 2: 0

## 2021-05-25 ASSESSMENT — ENCOUNTER SYMPTOMS
SORE THROAT: 0
SINUS PRESSURE: 1
SHORTNESS OF BREATH: 0
COUGH: 0
EYE PAIN: 0
DIARRHEA: 0
BACK PAIN: 0
ABDOMINAL PAIN: 0
CONSTIPATION: 0

## 2021-05-25 ASSESSMENT — SOCIAL DETERMINANTS OF HEALTH (SDOH): HOW HARD IS IT FOR YOU TO PAY FOR THE VERY BASICS LIKE FOOD, HOUSING, MEDICAL CARE, AND HEATING?: NOT HARD AT ALL

## 2021-05-25 NOTE — PROGRESS NOTES
Constance Monsalve  : 1950    Chief Complaint:     Chief Complaint   Patient presents with    Results     discuss kidney function       HPI  Constance Monsalve 79 y.o. presents for   Chief Complaint   Patient presents with    Results     discuss kidney function     She has noted decreasing GFR and increased cr. She does not drink much water. She drinks about 8 cups of coffee a day. She also is a diabetic but her A1c is very well controlled below 6.5. She is currently on Metformin. Every time she does do blood work she is fasting. She has not tried doing her blood work with water intake or nonfasting. She also admits to some allergies. She states she has been taking Claritin as needed. This somewhat helps. She does not take this every day. All questions were answered to patients satisfaction. Past Medical History:   Diagnosis Date    Actinic keratosis     Diabetes ([address]     Hyperlipidemia     Hypothyroidism     Lichen sclerosus     Rotator cuff impingement syndrome     Spinal arthritis     Spinal stenosis     Vertigo        Past Surgical History:   Procedure Laterality Date    BREAST BIOPSY      Stereotactic    COLONOSCOPY      COLONOSCOPY  10/01/2019    normal--christopher    COLONOSCOPY N/A 10/1/2019    COLONOSCOPY DIAGNOSTIC performed by Cade Varner MD at [facility], PARTIAL  2008    TUBAL LIGATION         Social History     Socioeconomic History    Marital status:      Spouse name: None    Number of children: None    Years of education: None    Highest education level: None   Occupational History    Occupation: artist   Tobacco Use    Smoking status: Never Smoker    Smokeless tobacco: Never Used   Vaping Use    Vaping Use: Never used   Substance and Sexual Activity    Alcohol use:  Yes     Alcohol/week: 7.0 standard drinks     Types: 7 Glasses of wine per week     Comment: daily    Drug use: No    Sexual activity: None   Other Topics Concern    None   Social History Narrative    None     Social Determinants of Health     Financial Resource Strain: Low Risk     Difficulty of Paying Living Expenses: Not hard at all   Food Insecurity: No Food Insecurity    Worried About Running Out of Food in the Last Year: Never true    Michi of Food in the Last Year: Never true   Transportation Needs:     Lack of Transportation (Medical):  Lack of Transportation (Non-Medical):    Physical Activity:     Days of Exercise per Week:     Minutes of Exercise per Session:    Stress:     Feeling of Stress :    Social Connections:     Frequency of Communication with Friends and Family:     Frequency of Social Gatherings with Friends and Family:     Attends Jainism Services:     Active Member of Clubs or Organizations:     Attends Club or Organization Meetings:     Marital Status:    Intimate Partner Violence:     Fear of Current or Ex-Partner:     Emotionally Abused:     Physically Abused:     Sexually Abused:        Family History   Problem Relation Age of Onset    Cancer Mother         pancreatic    Diabetes Father     Cancer Father         Prostate    Heart Failure Father     Stroke Father         due to medical intervention    Cancer Sister         Breast CA x2    Kidney Disease Maternal Grandmother     Heart Disease Maternal Grandfather     Heart Attack Maternal Grandfather     Cancer Paternal Grandmother     Heart Failure Paternal Grandfather     Other Other         No [address] [place]          Current Outpatient Medications   Medication Sig Dispense Refill    metFORMIN (GLUCOPHAGE) 1000 MG tablet Take 1 tablet by mouth 2 times daily (with meals) 180 tablet 3    levothyroxine (SYNTHROID) 75 MCG tablet Take 1 tablet by mouth See Admin Instructions Thursday-Sunday 30 tablet 5    levothyroxine (SYNTHROID) 50 MCG tablet TAKE 1 TABLET BY MOUTH ON MONDAY, TUESDAY, AND WEDNESDAY.  27 tablet 3    blood glucose test strips (FREESTYLE LITE) strip 1 each by In Vitro route daily As needed. 100 each 0    lovastatin (MEVACOR) 40 MG tablet TAKE ONE TABLET BY MOUTH EVERY DAY 90 tablet 0    Multiple Vitamins-Minerals (PRESERVISION AREDS 2) CHEW       Blood Glucose Monitoring Suppl (FREESTYLE FREEDOM LITE) w/Device KIT 1 kit by Does not apply route daily 1 kit 0    Lancets 28G MISC Once a day 100 each 3    Polyethylene Glycol 3350 (MIRALAX PO) Take by mouth Indications: PRN Pt takes a teaspoon daily      Psyllium (METAMUCIL FIBER PO) Take by mouth      B Complex Vitamins (VITAMIN B COMPLEX PO) Take by mouth 2 times daily       Cholecalciferol (VITAMIN D3) 1000 UNITS TABS Take by mouth daily      ciclopirox (PENLAC) 8 % solution Apply topically nightly. 1 Bottle 2     No current facility-administered medications for this visit. Allergies   Allergen Reactions    Seasonal      Post-nasal drip   Headaches  Ear aches       Health Maintenance Due   Topic Date Due    Hepatitis C screen  Never done    DTaP/Tdap/Td vaccine (1 - Tdap) Never done    Shingles Vaccine (1 of 2) Never done    Diabetic retinal exam  02/14/2020           REVIEW OF SYSTEMS  Review of Systems   Constitutional: Negative for fatigue and fever. HENT: Positive for sinus pressure. Negative for ear pain, sneezing and sore throat. Eyes: Negative for pain. Respiratory: Negative for cough and shortness of breath. Cardiovascular: Negative for chest pain and leg swelling. Gastrointestinal: Negative for abdominal pain, constipation and diarrhea. Genitourinary: Negative for dysuria and urgency. Musculoskeletal: Negative for back pain and myalgias. Skin: Negative for rash. Allergic/Immunologic: Negative for food allergies. Neurological: Negative for light-headedness and headaches. Hematological: Does not bruise/bleed easily. Psychiatric/Behavioral: Negative for behavioral problems and sleep disturbance. 07/28/2020    TRIG 89 07/28/2020    HDL 79 07/28/2020    LDLCALC 103 07/28/2020    LABA1C 6.3 02/05/2021        Lab Results   Component Value Date    CHOL 200 (H) 07/28/2020    CHOL 204 (H) 01/09/2020    CHOL 217 (H) 07/03/2019     Lab Results   Component Value Date    TRIG 89 07/28/2020    TRIG 125 01/09/2020    TRIG 111 07/03/2019     Lab Results   Component Value Date    HDL 79 07/28/2020    HDL 76 01/09/2020    HDL 73 07/03/2019     Lab Results   Component Value Date    LDLCALC 103 (H) 07/28/2020    LDLCALC 103 (H) 01/09/2020    LDLCALC 122 (H) 07/03/2019       Lab Results   Component Value Date    LABA1C 6.3 (H) 02/05/2021    LABA1C 6.3 02/05/2021    LABA1C 6.4 (H) 07/28/2020     Lab Results   Component Value Date    LABMICR <12.0 07/28/2020    LDLCALC 103 (H) 07/28/2020    CREATININE 1.1 (H) 03/30/2021       ASSESSMENT/PLAN:     Diagnosis Orders   1. Type 2 diabetes mellitus without complication, without long-term current use of insulin (HCC)     2. Stage 3a chronic kidney disease  BASIC METABOLIC PANEL   3. Environmental allergies       Likely GFR and elevated creatinine due to dehydration. Recommend to drink lots of water before her next test and repeat BMP. Patient is agreeable. As for allergies recommend you Xyzal over-the-counter. Side effects medication reviewed with patient. Continue to follow with [facility] for [facility]    Problem list reviewed andsimplified/updated  HM reviewed today and counseled as appropriate    Call or go to ED immediately if symptoms worsen or persist.  Future Appointments   Date Time Provider Javon Luis   6/11/2021  9:30 AM [facility]AMMOGRAP[facility] ROOM 4 [facility] BREAST None   10/12/2021 10:20 AM MD SUSIE Montgomery   10/19/2021  8:30 AM [facility]     Or sooner if necessary.       Educational materials and/or homeexercises printed for patient's review and were included in patient instructions on his/her After Visit Summary and given to patient at the end of visit.       Counseled regarding above diagnosis, including possible risks and complications,  especially if left uncontrolled.     Counseled regarding the possible side effects, risks, benefits and alternatives to treatment; patient and/or guardian verbalizes understanding, agrees,feels comfortable with and wishes to proceed with above treatment plan.     Advised patient to call Abdi Toledoal new medication issues, and read all Rx info from pharmacy to assure aware of all possible risks and side effects of medication before taking.     Reviewed age and gender appropriate health screening exams and vaccinations. Advised patient regarding importance of keeping up with recommended health maintenance and toschedule as soon as possible if overdue, as this is important in assessing for undiagnosed pathology, especially cancer, as well as protecting against potentially harmful/life threatening disease.          Patient and/or guardian verbalizes understanding and agrees with above counseling, assessment and plan.     All questions answered. Cynthia Bills DO  5/25/21    NOTE: This report was transcribed using voice recognition software.  Every effort was made to ensure accuracy; however, inadvertent computerized transcription errors may be present

## 2021-06-03 ENCOUNTER — HOSPITAL ENCOUNTER (OUTPATIENT)
Age: 71
Discharge: HOME | End: 2021-06-03
Payer: MEDICARE

## 2021-06-03 DIAGNOSIS — N18.31 STAGE 3A CHRONIC KIDNEY DISEASE (HCC): ICD-10-CM

## 2021-06-03 LAB
ANION GAP SERPL CALCULATED.3IONS-SCNC: 12 MMOL/L (ref 7–16)
BUN BLDV-MCNC: 19 MG/DL (ref 6–23)
CALCIUM SERPL-MCNC: 9.5 MG/DL (ref 8.6–10.2)
CHLORIDE BLD-SCNC: 103 MMOL/L (ref 98–107)
CO2: 23 MMOL/L (ref 22–29)
CREAT SERPL-MCNC: 1 MG/DL (ref 0.5–1)
GFR AFRICAN AMERICAN: >60
GFR NON-AFRICAN AMERICAN: 55 ML/MIN/1.73
GLUCOSE BLD-MCNC: 164 MG/DL (ref 74–99)
POTASSIUM SERPL-SCNC: 4.1 MMOL/L (ref 3.5–5)
SODIUM BLD-SCNC: 138 MMOL/L (ref 132–146)

## 2021-06-03 PROCEDURE — 36415 COLL VENOUS BLD VENIPUNCTURE: CPT

## 2021-06-03 PROCEDURE — 80048 BASIC METABOLIC PNL TOTAL CA: CPT

## 2021-06-17 ENCOUNTER — TELEPHONE (OUTPATIENT)
Dept: FAMILY MEDICINE CLINIC | Age: 71
End: 2021-06-17

## 2021-06-17 NOTE — TELEPHONE ENCOUNTER
Called patient in regards to genetic counseling referral requested by [facility] since patient had a positive cancer risk assessment. Per patient she does not wish to pursue at this time. She will contact office to schedule an appointment this summer after she returns from vacation to discuss with you further.

## 2021-07-15 ENCOUNTER — OFFICE VISIT (OUTPATIENT)
Dept: FAMILY MEDICINE CLINIC | Age: 71
End: 2021-07-15
Payer: MEDICARE

## 2021-07-15 VITALS
DIASTOLIC BLOOD PRESSURE: 70 MMHG | OXYGEN SATURATION: 100 % | BODY MASS INDEX: 23.78 KG/M2 | WEIGHT: 148 LBS | HEART RATE: 60 BPM | HEIGHT: 66 IN | SYSTOLIC BLOOD PRESSURE: 129 MMHG | RESPIRATION RATE: 18 BRPM

## 2021-07-15 DIAGNOSIS — N18.31 STAGE 3A CHRONIC KIDNEY DISEASE (HCC): ICD-10-CM

## 2021-07-15 DIAGNOSIS — L90.0 LICHEN SCLEROSUS: Primary | ICD-10-CM

## 2021-07-15 DIAGNOSIS — Z91.89 AT HIGH RISK FOR BREAST CANCER: ICD-10-CM

## 2021-07-15 PROCEDURE — 99213 OFFICE O/P EST LOW 20 MIN: CPT | Performed by: FAMILY MEDICINE

## 2021-07-15 RX ORDER — CLOBETASOL PROPIONATE 0.5 MG/G
CREAM TOPICAL
COMMUNITY
Start: 2021-07-13 | End: 2021-10-19 | Stop reason: ALTCHOICE

## 2021-07-15 ASSESSMENT — ENCOUNTER SYMPTOMS
CONSTIPATION: 0
SINUS PRESSURE: 0
SHORTNESS OF BREATH: 0
SORE THROAT: 0
COUGH: 0
EYE PAIN: 0
BACK PAIN: 0
DIARRHEA: 0
ABDOMINAL PAIN: 0

## 2021-07-15 NOTE — PROGRESS NOTES
Jhonny Desai  : 1950    Chief Complaint:     Chief Complaint   Patient presents with    Diabetes       HPI  Jhonny Desai 79 y.o. presents for   Chief Complaint   Patient presents with    Diabetes     Presents for follow-up. She got a letter in the mail from [facility] saying that she is at high risk for breast cancer. They did recommend possible genetic testing and referral to the breast cancer center. Patient states there is a strong family history of cancer and does wish to be referred. She also has noted lichen sclerosis that has returned. She was given cream by her dermatologist.  I did review her labs today with her. Her kidney function is improved. We did discuss increasing her water intake. All questions were answered to patients satisfaction. Past Medical History:   Diagnosis Date    Actinic keratosis     Diabetes ([address]     Hyperlipidemia     Hypothyroidism     Lichen sclerosus     Rotator cuff impingement syndrome     Spinal arthritis     Spinal stenosis     Vertigo        Past Surgical History:   Procedure Laterality Date    BREAST BIOPSY      Stereotactic    COLONOSCOPY      COLONOSCOPY  10/01/2019    normal--christopher    COLONOSCOPY N/A 10/1/2019    COLONOSCOPY DIAGNOSTIC performed by Vika Lakhani MD at [place] [id number], PARTIAL  2008    TUBAL LIGATION         Social History     Socioeconomic History    Marital status:      Spouse name: None    Number of children: None    Years of education: None    Highest education level: None   Occupational History    Occupation: artist   Tobacco Use    Smoking status: Never Smoker    Smokeless tobacco: Never Used   Vaping Use    Vaping Use: Never used   Substance and Sexual Activity    Alcohol use:  Yes     Alcohol/week: 7.0 standard drinks     Types: 7 Glasses of wine per week     Comment: daily    Drug use: No    Sexual activity: None   Other Topics Concern    None   Social History Narrative    None     Social Determinants of Health     Financial Resource Strain: Low Risk     Difficulty of Paying Living Expenses: Not hard at all   Food Insecurity: No Food Insecurity    Worried About Running Out of Food in the Last Year: Never true    Michi of Food in the Last Year: Never true   Transportation Needs:     Lack of Transportation (Medical):      Lack of Transportation (Non-Medical):    Physical Activity:     Days of Exercise per Week:     Minutes of Exercise per Session:    Stress:     Feeling of Stress :    Social Connections:     Frequency of Communication with Friends and Family:     Frequency of Social Gatherings with Friends and Family:     Attends Hoahaoism Services:     Active Member of Clubs or Organizations:     Attends Club or Organization Meetings:     Marital Status:    Intimate Partner Violence:     Fear of Current or Ex-Partner:     Emotionally Abused:     Physically Abused:     Sexually Abused:        Family History   Problem Relation Age of Onset    Cancer Mother         pancreatic    Diabetes Father     Cancer Father         Prostate    Heart Failure Father     Stroke Father         due to medical intervention    Cancer Sister         Breast CA x2    Kidney Disease Maternal Grandmother     Heart Disease Maternal Grandfather     Heart Attack Maternal Grandfather     Cancer Paternal Grandmother     Heart Failure Paternal Grandfather     Other Other         No [address] [place]          Current Outpatient Medications   Medication Sig Dispense Refill    clobetasol (TEMOVATE) 0.05 % cream APPLY THREE TIMES DAILY FOR 3 WEEKS  THEN TWO TIMES DAILY FOR 2 WEEKS  THEN ONCE DAILY FOR 1 WEEK      Multiple Vitamins-Minerals (PRESERVISION AREDS 2+MULTI VIT PO) Take by mouth      Omega-3 Fatty Acids ([employer] NATURAL FISH OIL PO) Take by mouth      lovastatin (MEVACOR) 40 MG tablet TAKE ONE TABLET BY MOUTH EVERY DAY 90 tablet 2    blood glucose test strips (FREESTYLE LITE) strip 1 each by In Vitro route daily As needed. 100 each 0    metFORMIN (GLUCOPHAGE) 1000 MG tablet Take 1 tablet by mouth 2 times daily (with meals) 180 tablet 3    levothyroxine (SYNTHROID) 75 MCG tablet Take 1 tablet by mouth See Admin Instructions Thursday-Sunday 30 tablet 5    levothyroxine (SYNTHROID) 50 MCG tablet TAKE 1 TABLET BY MOUTH ON MONDAY, TUESDAY, AND WEDNESDAY. 30 tablet 3    Multiple Vitamins-Minerals (PRESERVISION AREDS 2) CHEW       Blood Glucose Monitoring Suppl (FREESTYLE FREEDOM LITE) w/Device KIT 1 kit by Does not apply route daily 1 kit 0    Lancets 28G MISC Once a day 100 each 3    ciclopirox (PENLAC) 8 % solution Apply topically nightly. 1 Bottle 2    Polyethylene Glycol 3350 (MIRALAX PO) Take by mouth Indications: PRN Pt takes a teaspoon daily      Psyllium (METAMUCIL FIBER PO) Take by mouth      B Complex Vitamins (VITAMIN B COMPLEX PO) Take by mouth 2 times daily       Cholecalciferol (VITAMIN D3) 1000 UNITS TABS Take by mouth daily       No current facility-administered medications for this visit. Allergies   Allergen Reactions    Seasonal      Post-nasal drip   Headaches  Ear aches       Health Maintenance Due   Topic Date Due    Hepatitis C screen  Never done    DTaP/Tdap/Td vaccine (1 - Tdap) Never done    Shingles Vaccine (1 of 2) Never done    Diabetic retinal exam  02/14/2020    Diabetic microalbuminuria test  07/28/2021    Lipid screen  07/28/2021           REVIEW OF SYSTEMS  Review of Systems   Constitutional: Negative for fatigue and fever. HENT: Negative for ear pain, sinus pressure, sneezing and sore throat. Eyes: Negative for pain. Respiratory: Negative for cough and shortness of breath. Cardiovascular: Negative for chest pain and leg swelling. Gastrointestinal: Negative for abdominal pain, constipation and diarrhea. Genitourinary: Negative for dysuria and urgency. Musculoskeletal: Negative for back pain and myalgias. Skin: Negative for rash. Allergic/Immunologic: Negative for food allergies. Neurological: Negative for light-headedness and headaches. Hematological: Does not bruise/bleed easily. Psychiatric/Behavioral: Negative for behavioral problems and sleep disturbance. PHYSICAL EXAM  /70   Pulse 60   Resp 18   Ht 5' 6\" (1.676 m)   Wt 148 lb (67.1 kg)   SpO2 100%   BMI 23.89 kg/m²   Physical Exam  Vitals and nursing note reviewed. Constitutional:       Appearance: Normal appearance. She is normal weight. HENT:      Head: Normocephalic and atraumatic. Nose: Nose normal.   Eyes:      Extraocular Movements: Extraocular movements intact. Cardiovascular:      Rate and Rhythm: Normal rate. Pulmonary:      Effort: Pulmonary effort is normal.   Musculoskeletal:      Cervical back: Normal range of motion. Skin:     General: Skin is warm and dry. Neurological:      General: No focal deficit present. Mental Status: She is alert and oriented to person, place, and time. Psychiatric:         Mood and Affect: Mood normal.         Behavior: Behavior normal.              Laboratory:   All laboratory and radiology results have been personally reviewed by myself    Lab Results   Component Value Date     06/03/2021    K 4.1 06/03/2021     06/03/2021    CO2 23 06/03/2021    BUN 19 06/03/2021    CREATININE 1.0 06/03/2021    PROT 6.8 03/30/2021    LABALBU 4.4 03/30/2021    CALCIUM 9.5 06/03/2021    GFRAA >60 06/03/2021    LABGLOM 55 06/03/2021    GLUCOSE 164 06/03/2021    AST 18 03/30/2021    ALT 13 03/30/2021    ALKPHOS 72 03/30/2021    BILITOT 0.3 03/30/2021    TSH 3.750 03/30/2021    CHOL 200 07/28/2020    TRIG 89 07/28/2020    HDL 79 07/28/2020    LDLCALC 103 07/28/2020    LABA1C 6.3 02/05/2021        Lab Results   Component Value Date    CHOL 200 (H) 07/28/2020    CHOL 204 (H) 01/09/2020    CHOL 217 (H) 07/03/2019     Lab Results   Component Value Date    TRIG 89 07/28/2020    TRIG 125 01/09/2020    TRIG 111 07/03/2019     Lab Results   Component Value Date    HDL 79 07/28/2020    HDL 76 01/09/2020    HDL 73 07/03/2019     Lab Results   Component Value Date    LDLCALC 103 (H) 07/28/2020    LDLCALC 103 (H) 01/09/2020    LDLCALC 122 (H) 07/03/2019       Lab Results   Component Value Date    LABA1C 6.3 (H) 02/05/2021    LABA1C 6.3 02/05/2021    LABA1C 6.4 (H) 07/28/2020     Lab Results   Component Value Date    LABMICR <12.0 07/28/2020    LDLCALC 103 (H) 07/28/2020    CREATININE 1.0 06/03/2021       ASSESSMENT/PLAN:     Diagnosis Orders   1. Lichen sclerosus     2. Stage 3a chronic kidney disease ([facility] [address]     3. At high risk for breast cancer  External Referral To Genetics     Referral was placed to genetics per patient preference. As for lichen sclerosus did recommend to use clobetasol for 2 weeks and see how she does after those 2 weeks. Patient agreeable. Did recommend increase water intake as kidney function was improved after she did drink much more water. Did review mammogram, letter from [id number] [facility] personally    Problem list reviewed andsimplified/updated  HM reviewed today and counseled as appropriate    Call or go to ED immediately if symptoms worsen or persist.  Future Appointments   Date Time Provider Javon Luis   10/12/2021 10:20 AM Maryanne Hallman MD [facility]   10/19/2021  8:30 AM DO Mercedes Leroy [facility]     Or sooner if necessary.       Educational materials and/or homeexercises printed for patient's review and were included in patient instructions on his/her After Visit Summary and given to patient at the end of visit.       Counseled regarding above diagnosis, including possible risks and complications,  especially if left uncontrolled.     Counseled regarding the possible side effects, risks, benefits and alternatives to treatment; patient and/or guardian verbalizes understanding, agrees,feels comfortable with and wishes to proceed with above treatment plan.     Advised patient to call Madelaine Osborne new medication issues, and read all Rx info from pharmacy to assure aware of all possible risks and side effects of medication before taking.     Reviewed age and gender appropriate health screening exams and vaccinations. Advised patient regarding importance of keeping up with recommended health maintenance and toschedule as soon as possible if overdue, as this is important in assessing for undiagnosed pathology, especially cancer, as well as protecting against potentially harmful/life threatening disease.          Patient and/or guardian verbalizes understanding and agrees with above counseling, assessment and plan.     All questions answered. Manuel Jensen,   7/15/21    NOTE: This report was transcribed using voice recognition software.  Every effort was made to ensure accuracy; however, inadvertent computerized transcription errors may be present

## 2021-09-24 ENCOUNTER — HOSPITAL ENCOUNTER (OUTPATIENT)
Age: 71
Discharge: HOME | End: 2021-09-24
Payer: MEDICARE

## 2021-09-24 DIAGNOSIS — E03.9 PRIMARY HYPOTHYROIDISM: ICD-10-CM

## 2021-09-24 DIAGNOSIS — E11.9 TYPE 2 DIABETES MELLITUS WITHOUT COMPLICATION, WITHOUT LONG-TERM CURRENT USE OF INSULIN (HCC): ICD-10-CM

## 2021-09-24 LAB
ALBUMIN SERPL-MCNC: 4.3 G/DL (ref 3.5–5.2)
ALP BLD-CCNC: 68 U/L (ref 35–104)
ALT SERPL-CCNC: 17 U/L (ref 0–32)
ANION GAP SERPL CALCULATED.3IONS-SCNC: 10 MMOL/L (ref 7–16)
AST SERPL-CCNC: 21 U/L (ref 0–31)
BILIRUB SERPL-MCNC: 0.2 MG/DL (ref 0–1.2)
BUN BLDV-MCNC: 16 MG/DL (ref 6–23)
CALCIUM SERPL-MCNC: 9.7 MG/DL (ref 8.6–10.2)
CHLORIDE BLD-SCNC: 106 MMOL/L (ref 98–107)
CHOLESTEROL, TOTAL: 206 MG/DL (ref 0–199)
CO2: 27 MMOL/L (ref 22–29)
CREAT SERPL-MCNC: 1 MG/DL (ref 0.5–1)
GFR AFRICAN AMERICAN: >60
GFR NON-AFRICAN AMERICAN: 55 ML/MIN/1.73
GLUCOSE BLD-MCNC: 103 MG/DL (ref 74–99)
HBA1C MFR BLD: 6.2 % (ref 4–5.6)
HDLC SERPL-MCNC: 84 MG/DL
LDL CHOLESTEROL CALCULATED: 105 MG/DL (ref 0–99)
POTASSIUM SERPL-SCNC: 4.9 MMOL/L (ref 3.5–5)
SODIUM BLD-SCNC: 143 MMOL/L (ref 132–146)
T4 FREE: 1.46 NG/DL (ref 0.93–1.7)
TOTAL PROTEIN: 6.5 G/DL (ref 6.4–8.3)
TRIGL SERPL-MCNC: 84 MG/DL (ref 0–149)
TSH SERPL DL<=0.05 MIU/L-ACNC: 4.87 UIU/ML (ref 0.27–4.2)
VLDLC SERPL CALC-MCNC: 17 MG/DL

## 2021-09-24 PROCEDURE — 36415 COLL VENOUS BLD VENIPUNCTURE: CPT

## 2021-09-24 PROCEDURE — 84443 ASSAY THYROID STIM HORMONE: CPT

## 2021-09-24 PROCEDURE — 83036 HEMOGLOBIN GLYCOSYLATED A1C: CPT

## 2021-09-24 PROCEDURE — 80053 COMPREHEN METABOLIC PANEL: CPT

## 2021-09-24 PROCEDURE — 80061 LIPID PANEL: CPT

## 2021-09-24 PROCEDURE — 84439 ASSAY OF FREE THYROXINE: CPT

## 2021-10-19 ENCOUNTER — OFFICE VISIT (OUTPATIENT)
Dept: FAMILY MEDICINE CLINIC | Age: 71
End: 2021-10-19
Payer: MEDICARE

## 2021-10-19 VITALS
HEIGHT: 67 IN | HEART RATE: 81 BPM | SYSTOLIC BLOOD PRESSURE: 126 MMHG | RESPIRATION RATE: 18 BRPM | BODY MASS INDEX: 23.23 KG/M2 | DIASTOLIC BLOOD PRESSURE: 62 MMHG | WEIGHT: 148 LBS | TEMPERATURE: 97.4 F

## 2021-10-19 DIAGNOSIS — E11.9 TYPE 2 DIABETES MELLITUS WITHOUT COMPLICATION, WITHOUT LONG-TERM CURRENT USE OF INSULIN (HCC): ICD-10-CM

## 2021-10-19 DIAGNOSIS — Z00.00 ROUTINE GENERAL MEDICAL EXAMINATION AT A HEALTH CARE FACILITY: Primary | ICD-10-CM

## 2021-10-19 PROBLEM — E11.42 DIABETIC POLYNEUROPATHY (HCC): Status: ACTIVE | Noted: 2021-10-19

## 2021-10-19 PROBLEM — G98.8 DISORDER OF NERVOUS SYSTEM DUE TO TYPE 2 DIABETES MELLITUS (HCC): Status: ACTIVE | Noted: 2021-10-19

## 2021-10-19 PROBLEM — E11.69 DISORDER OF NERVOUS SYSTEM DUE TO TYPE 2 DIABETES MELLITUS (HCC): Status: ACTIVE | Noted: 2021-10-19

## 2021-10-19 PROCEDURE — G0439 PPPS, SUBSEQ VISIT: HCPCS | Performed by: FAMILY MEDICINE

## 2021-10-19 ASSESSMENT — LIFESTYLE VARIABLES
HOW OFTEN DO YOU HAVE A DRINK CONTAINING ALCOHOL: 4
HOW OFTEN DURING THE LAST YEAR HAVE YOU HAD A FEELING OF GUILT OR REMORSE AFTER DRINKING: 0
AUDIT TOTAL SCORE: 4
HOW OFTEN DURING THE LAST YEAR HAVE YOU FAILED TO DO WHAT WAS NORMALLY EXPECTED FROM YOU BECAUSE OF DRINKING: 0
HOW OFTEN DURING THE LAST YEAR HAVE YOU FOUND THAT YOU WERE NOT ABLE TO STOP DRINKING ONCE YOU HAD STARTED: 0
HOW OFTEN DO YOU HAVE SIX OR MORE DRINKS ON ONE OCCASION: 0
HAS A RELATIVE, FRIEND, DOCTOR, OR ANOTHER HEALTH PROFESSIONAL EXPRESSED CONCERN ABOUT YOUR DRINKING OR SUGGESTED YOU CUT DOWN: 0
HAVE YOU OR SOMEONE ELSE BEEN INJURED AS A RESULT OF YOUR DRINKING: 0
HOW OFTEN DURING THE LAST YEAR HAVE YOU BEEN UNABLE TO REMEMBER WHAT HAPPENED THE NIGHT BEFORE BECAUSE YOU HAD BEEN DRINKING: 0
HOW MANY STANDARD DRINKS CONTAINING ALCOHOL DO YOU HAVE ON A TYPICAL DAY: 0
AUDIT-C TOTAL SCORE: 4
HOW OFTEN DURING THE LAST YEAR HAVE YOU NEEDED AN ALCOHOLIC DRINK FIRST THING IN THE MORNING TO GET YOURSELF GOING AFTER A NIGHT OF HEAVY DRINKING: 0

## 2021-10-19 ASSESSMENT — PATIENT HEALTH QUESTIONNAIRE - PHQ9
SUM OF ALL RESPONSES TO PHQ QUESTIONS 1-9: 0
SUM OF ALL RESPONSES TO PHQ9 QUESTIONS 1 & 2: 0
SUM OF ALL RESPONSES TO PHQ QUESTIONS 1-9: 0
2. FEELING DOWN, DEPRESSED OR HOPELESS: 0
SUM OF ALL RESPONSES TO PHQ QUESTIONS 1-9: 0
1. LITTLE INTEREST OR PLEASURE IN DOING THINGS: 0

## 2022-01-26 ENCOUNTER — NURSE TRIAGE (OUTPATIENT)
Dept: OTHER | Facility: CLINIC | Age: 72
End: 2022-01-26

## 2022-01-26 NOTE — TELEPHONE ENCOUNTER
Called and spoke with patient's wife, Ingrid. C2C on file.     Advised of refill of levothyroxine 200 mcg for 2 months, due for labs. Lab appointment scheduled.     Patient stated an understanding and agreed with plan. ]    Mariana Nguyen RN  [facility]         Received call from [address] at [facility] with Red Flag Complaint. Subjective: Caller states \"pulled my back on Monday while making her bed, and had been shoveling snow over the weekend as well\"     Current Symptoms: Lower back pain, bilateral     Onset: 2 days ago; unchanged    Associated Symptoms: NA    Pain Severity: 7/10; sharp; waxing and waning    Temperature: denies    What has been tried: ice, heat, tylenol, rest - no better    LMP: NA Pregnant: NA    Recommended disposition: seen in office within 3 days    Care advice provided, patient verbalizes understanding; denies any other questions or concerns; instructed to call back for any new or worsening symptoms. Writer provided warm transfer to Gillian Liang at [facility] for appointment scheduling. Attention Provider: Thank you for allowing me to participate in the care of your patient. The patient was connected to triage in response to information provided to the ECC/PSC. Please do not respond through this encounter as the response is not directed to a shared pool.       Reason for Disposition   MODERATE back pain (e.g., interferes with normal activities) and present > 3 days    Protocols used: BACK PAIN-ADULT-[place]    Future Appointments   Date Time Provider Javon Luis   4/19/2022 11:00 AM MD SUSIE Robertson   10/20/2022 10:00 AM DO Mercedes Chapa [facility]

## 2022-01-27 ENCOUNTER — OFFICE VISIT (OUTPATIENT)
Dept: FAMILY MEDICINE CLINIC | Age: 72
End: 2022-01-27
Payer: MEDICARE

## 2022-01-27 VITALS
HEART RATE: 81 BPM | TEMPERATURE: 97.6 F | SYSTOLIC BLOOD PRESSURE: 134 MMHG | DIASTOLIC BLOOD PRESSURE: 69 MMHG | HEIGHT: 66 IN | RESPIRATION RATE: 20 BRPM | WEIGHT: 155 LBS | BODY MASS INDEX: 24.91 KG/M2

## 2022-01-27 DIAGNOSIS — M54.50 ACUTE BILATERAL LOW BACK PAIN WITHOUT SCIATICA: Primary | ICD-10-CM

## 2022-01-27 DIAGNOSIS — E11.9 TYPE 2 DIABETES MELLITUS WITHOUT COMPLICATION, WITHOUT LONG-TERM CURRENT USE OF INSULIN (HCC): ICD-10-CM

## 2022-01-27 PROCEDURE — 99213 OFFICE O/P EST LOW 20 MIN: CPT | Performed by: FAMILY MEDICINE

## 2022-01-27 RX ORDER — METHYLPREDNISOLONE 4 MG/1
TABLET ORAL
Qty: 1 KIT | Refills: 0 | Status: SHIPPED | OUTPATIENT
Start: 2022-01-27 | End: 2022-02-02

## 2022-01-27 RX ORDER — BACLOFEN 10 MG/1
10 TABLET ORAL 3 TIMES DAILY
Qty: 20 TABLET | Refills: 0 | Status: SHIPPED
Start: 2022-01-27 | End: 2022-04-14

## 2022-01-27 ASSESSMENT — ENCOUNTER SYMPTOMS
SORE THROAT: 0
SINUS PRESSURE: 0
CONSTIPATION: 0
COUGH: 0
ABDOMINAL PAIN: 0
SHORTNESS OF BREATH: 0
EYE PAIN: 0
BACK PAIN: 1
DIARRHEA: 0

## 2022-01-27 NOTE — PROGRESS NOTES
Tristan Woods  : 1950    Chief Complaint:     Chief Complaint   Patient presents with    Lower Back Pain       HPI  Tristan Woods 70 y.o. presents for   Chief Complaint   Patient presents with    Lower Back Pain     Back Pain:  Patient complains of a 3 day(s) history of bilateral lower back pain. Pain is sharp and throbbing in nature, without radiation. Pain is constant, typically moderate in intensity, and is exacerbated by standing and walking. Associated symptoms: none. Patient reports the following CPR Red Flags: none. Precipitating factors: no known injury. Patient's history includes no prior back problems. Previous treatment: none. Diagnostic testing: none. Symptoms are improving over time. All questions were answered to patients satisfaction. Past Medical History:   Diagnosis Date    Actinic keratosis     Diabetes ([facility] [address]     Hyperlipidemia     Hypothyroidism     Lichen sclerosus     Rotator cuff impingement syndrome     Spinal arthritis     Spinal stenosis     Vertigo        Allergies   Allergen Reactions    Seasonal      Post-nasal drip   Headaches  Ear aches       Health Maintenance Due   Topic Date Due    Hepatitis C screen  Never done    DTaP/Tdap/Td vaccine (1 - Tdap) Never done    Shingles Vaccine (1 of 2) Never done    Diabetic microalbuminuria test  2021    Diabetic foot exam  2021         REVIEW OF SYSTEMS  Review of Systems   Constitutional: Negative for fatigue and fever. HENT: Negative for ear pain, sinus pressure, sneezing and sore throat. Eyes: Negative for pain. Respiratory: Negative for cough and shortness of breath. Cardiovascular: Negative for chest pain and leg swelling. Gastrointestinal: Negative for abdominal pain, constipation and diarrhea. Genitourinary: Negative for dysuria and urgency. Musculoskeletal: Positive for back pain. Negative for myalgias. Skin: Negative for rash.    Allergic/Immunologic: Negative for food allergies. Neurological: Negative for light-headedness and headaches. Hematological: Does not bruise/bleed easily. Psychiatric/Behavioral: Negative for behavioral problems and sleep disturbance. PHYSICAL EXAM  /69   Pulse 81   Temp 97.6 °F (36.4 °C)   Resp 20   Ht 5' 6\" (1.676 m)   Wt 155 lb (70.3 kg)   BMI 25.02 kg/m²   Physical Exam  Vitals and nursing note reviewed. Constitutional:       Appearance: Normal appearance. Cardiovascular:      Rate and Rhythm: Normal rate and regular rhythm. Pulmonary:      Effort: Pulmonary effort is normal.      Breath sounds: Normal breath sounds. Musculoskeletal:         General: Tenderness (bilateral PSIS) present. Skin:     General: Skin is warm and dry. Neurological:      General: No focal deficit present. Mental Status: She is alert and oriented to person, place, and time. Sensory: No sensory deficit. Motor: No weakness. Psychiatric:         Mood and Affect: Mood normal.         Behavior: Behavior normal.              Laboratory:   All laboratory and radiology results have been personally reviewed by myself    Lab Results   Component Value Date     09/24/2021    K 4.9 09/24/2021     09/24/2021    CO2 27 09/24/2021    BUN 16 09/24/2021    CREATININE 1.0 09/24/2021    PROT 6.5 09/24/2021    LABALBU 4.3 09/24/2021    CALCIUM 9.7 09/24/2021    GFRAA >60 09/24/2021    LABGLOM 55 09/24/2021    GLUCOSE 103 09/24/2021    AST 21 09/24/2021    ALT 17 09/24/2021    ALKPHOS 68 09/24/2021    BILITOT 0.2 09/24/2021    TSH 4.870 09/24/2021    CHOL 206 09/24/2021    TRIG 84 09/24/2021    HDL 84 09/24/2021    LDLCALC 105 09/24/2021    LABA1C 6.2 09/24/2021        Lab Results   Component Value Date    CHOL 206 (H) 09/24/2021     Lab Results   Component Value Date    TRIG 84 09/24/2021     Lab Results   Component Value Date    HDL 84 09/24/2021     Lab Results   Component Value Date    LDLCALC 105 (H) 09/24/2021       Lab Results Advised patient regarding importance of keeping up with recommended health maintenance and toschedule as soon as possible if overdue, as this is important in assessing for undiagnosed pathology, especially cancer, as well as protecting against potentially harmful/life threatening disease. and/or guardian verbalizes understanding and agrees with above counseling, assessment and plan. All questions answered. Sulema 5, DO  1/27/22    NOTE: This report was transcribed using voice recognition software.  Every effort was made to ensure accuracy; however, inadvertent computerized transcription errors may be present

## 2022-03-30 ENCOUNTER — PATIENT MESSAGE (OUTPATIENT)
Dept: FAMILY MEDICINE CLINIC | Age: 72
End: 2022-03-30

## 2022-03-30 DIAGNOSIS — K59.00 CONSTIPATION, UNSPECIFIED CONSTIPATION TYPE: Primary | ICD-10-CM

## 2022-03-30 NOTE — TELEPHONE ENCOUNTER
From: Gabrielle Rios  To: Dr. Lyric Rodriguez: 3/30/2022 3:15 PM EDT  Subject: Referral to a gastroenterologist    Hi Dr. Peri Tierney,    I am continuing to have gastro problems, and if anything, they seem to be getting worse. My  would like me to be seen at the [facility], as they are in network and he has been seen there. I called, and I have to have my PCP refer me and send my records to them in order to get an appointment. Their fax number is: [phone]. The doctors we would like to [facility] are Crystal Maze, or [place], or [employer]. On the other hand, is there someone local you have confidence in? If so, we would have to see if they are in our network. Thanks for your help.  Carlo CHANDLER - 1950

## 2022-04-11 ENCOUNTER — HOSPITAL ENCOUNTER (OUTPATIENT)
Age: 72
Discharge: HOME | End: 2022-04-11
Payer: MEDICARE

## 2022-04-11 DIAGNOSIS — E11.9 TYPE 2 DIABETES MELLITUS WITHOUT COMPLICATION, WITHOUT LONG-TERM CURRENT USE OF INSULIN (HCC): ICD-10-CM

## 2022-04-11 LAB
ALBUMIN SERPL-MCNC: 4.2 G/DL (ref 3.5–5.2)
ALP BLD-CCNC: 85 U/L (ref 35–104)
ALT SERPL-CCNC: 12 U/L (ref 0–32)
ANION GAP SERPL CALCULATED.3IONS-SCNC: 11 MMOL/L (ref 7–16)
AST SERPL-CCNC: 16 U/L (ref 0–31)
BILIRUB SERPL-MCNC: 0.2 MG/DL (ref 0–1.2)
BUN BLDV-MCNC: 15 MG/DL (ref 6–23)
CALCIUM SERPL-MCNC: 9.7 MG/DL (ref 8.6–10.2)
CHLORIDE BLD-SCNC: 105 MMOL/L (ref 98–107)
CHOLESTEROL, TOTAL: 203 MG/DL (ref 0–199)
CO2: 26 MMOL/L (ref 22–29)
CREAT SERPL-MCNC: 1 MG/DL (ref 0.5–1)
CREATININE URINE: 76 MG/DL (ref 29–226)
GFR AFRICAN AMERICAN: >60
GFR NON-AFRICAN AMERICAN: 55 ML/MIN/1.73
GLUCOSE BLD-MCNC: 114 MG/DL (ref 74–99)
HBA1C MFR BLD: 6.1 % (ref 4–5.6)
HDLC SERPL-MCNC: 71 MG/DL
LDL CHOLESTEROL CALCULATED: 118 MG/DL (ref 0–99)
MICROALBUMIN UR-MCNC: <12 MG/L
MICROALBUMIN/CREAT UR-RTO: ABNORMAL (ref 0–30)
POTASSIUM SERPL-SCNC: 4.5 MMOL/L (ref 3.5–5)
SODIUM BLD-SCNC: 142 MMOL/L (ref 132–146)
T4 FREE: 1.39 NG/DL (ref 0.93–1.7)
TOTAL PROTEIN: 6.8 G/DL (ref 6.4–8.3)
TRIGL SERPL-MCNC: 71 MG/DL (ref 0–149)
TSH SERPL DL<=0.05 MIU/L-ACNC: 4.07 UIU/ML (ref 0.27–4.2)
VLDLC SERPL CALC-MCNC: 14 MG/DL

## 2022-04-11 PROCEDURE — 83036 HEMOGLOBIN GLYCOSYLATED A1C: CPT

## 2022-04-11 PROCEDURE — 36415 COLL VENOUS BLD VENIPUNCTURE: CPT

## 2022-04-11 PROCEDURE — 80053 COMPREHEN METABOLIC PANEL: CPT

## 2022-04-11 PROCEDURE — 84443 ASSAY THYROID STIM HORMONE: CPT

## 2022-04-11 PROCEDURE — 82044 UR ALBUMIN SEMIQUANTITATIVE: CPT

## 2022-04-11 PROCEDURE — 80061 LIPID PANEL: CPT

## 2022-04-11 PROCEDURE — 84439 ASSAY OF FREE THYROXINE: CPT

## 2022-04-11 PROCEDURE — 82570 ASSAY OF URINE CREATININE: CPT

## 2022-04-14 ENCOUNTER — OFFICE VISIT (OUTPATIENT)
Dept: GASTROENTEROLOGY | Age: 72
End: 2022-04-14
Payer: MEDICARE

## 2022-04-14 VITALS
BODY MASS INDEX: 24.27 KG/M2 | TEMPERATURE: 98 F | RESPIRATION RATE: 18 BRPM | HEART RATE: 70 BPM | HEIGHT: 66 IN | SYSTOLIC BLOOD PRESSURE: 120 MMHG | OXYGEN SATURATION: 100 % | DIASTOLIC BLOOD PRESSURE: 70 MMHG | WEIGHT: 151 LBS

## 2022-04-14 DIAGNOSIS — R19.4 CHANGE IN BOWEL HABIT: Primary | ICD-10-CM

## 2022-04-14 DIAGNOSIS — R14.0 BLOATING: ICD-10-CM

## 2022-04-14 DIAGNOSIS — K59.00 CONSTIPATION, UNSPECIFIED CONSTIPATION TYPE: ICD-10-CM

## 2022-04-14 PROCEDURE — 99202 OFFICE O/P NEW SF 15 MIN: CPT | Performed by: NURSE PRACTITIONER

## 2022-04-14 RX ORDER — POLYETHYLENE GLYCOL 3350, SODIUM SULFATE ANHYDROUS, SODIUM BICARBONATE, SODIUM CHLORIDE, POTASSIUM CHLORIDE 236; 22.74; 6.74; 5.86; 2.97 G/4L; G/4L; G/4L; G/4L; G/4L
4 POWDER, FOR SOLUTION ORAL ONCE
Qty: 4000 ML | Refills: 0 | Status: SHIPPED | OUTPATIENT
Start: 2022-04-14 | End: 2022-04-14

## 2022-04-14 NOTE — PROGRESS NOTES
Verla Eisenmenger (:  1950) is a 70 y.o. female, here for evaluation of the following chief complaint(s):  New Patient (ref by Dr. Frankey Sewer for constipation)      SUBJECTIVE/OBJECTIVE:  HPI:    Thanai Cohen is a very pleasant 70year old female that presents today with complaints of increased flatus and an increase in number of stools/day x 1 year. Patient had a rectocele repair a few years ago. Since that time, she has urgency and loss of sensation to have a bowel movement. Having a BM about 4 times a day. Describes the stools as hard or \"messy\". Difficult to clean afterwards. Recently, the patient was  awakened on 2 separate occasions with diarrhea with urgency. This is unusual.  No BRBPR or melena. Complains of bloating and left upper abdominal pain. Has had this for many years. The pain is dull and \"annoying\". It is self limiting. No fevers or weight loss. No family history of IBD or CRC. Patients mother passed from pancreatic cancer. Patients sister has had breast cancer on 3 different occasions. Colonoscopy in 2019 was normal. Takes Metamucil daily. No family history of CRC. ROS:  General: Patient denies n/v/f/c or weight loss. HEENT: Patient denies persistent postnasal drip, scleral icterus, drooling, persistent bleeding from nose/mouth. Resp: Patient denies SOB, wheezing, productive cough. Cards: Patient denies CP, palpitations, significant edema  GI: As above. Derm: Patient denies jaundice/rashes. Musc: Patient denies diffuse/irregular joint swelling or myalgias.       Objective   Wt Readings from Last 3 Encounters:   22 151 lb (68.5 kg)   22 155 lb (70.3 kg)   10/19/21 148 lb (67.1 kg)     Temp Readings from Last 3 Encounters:   22 98 °F (36.7 °C) (Infrared)   22 97.6 °F (36.4 °C)   10/19/21 97.4 °F (36.3 °C)     BP Readings from Last 3 Encounters:   22 120/70   22 134/69   10/19/21 126/62     Pulse Readings from Last 3 Encounters:   22 70 01/27/22 81   10/19/21 81        Physical Exam  Abdominal:      General: Abdomen is flat. Bowel sounds are normal.      Palpations: Abdomen is soft. Past Medical History:   Diagnosis Date    Actinic keratosis     Diabetes ([address]     Hyperlipidemia     Hypothyroidism     Lichen sclerosus     Rotator cuff impingement syndrome     Spinal arthritis     Spinal stenosis     Vertigo       Past Surgical History:   Procedure Laterality Date    BREAST BIOPSY      Stereotactic    COLONOSCOPY      COLONOSCOPY  10/01/2019    normal--christopher    COLONOSCOPY N/A 10/01/2019    COLONOSCOPY DIAGNOSTIC performed by Keenan Mondragon MD at [address] [facility] [address] [place] Right     right leg    RECTOCELE REPAIR      THYROIDECTOMY, PARTIAL  01/2008    TUBAL LIGATION        Family History   Problem Relation Age of Onset    Cancer Mother         pancreatic    Diabetes Father     Cancer Father         Prostate    Heart Failure Father     Stroke Father         due to medical intervention    Cancer Sister         Breast CA x 3    Kidney Disease Maternal Grandmother     Heart Disease Maternal Grandfather     Heart Attack Maternal Grandfather     Cancer Paternal Grandmother     Heart Failure Paternal Meryle Bailey Other Other         No [address] [place]        Lab Results   Component Value Date    WBC 5.0 06/14/2018    HGB 14.1 06/14/2018    HCT 41.9 06/14/2018    MCV 89.5 06/14/2018     06/14/2018      Lab Results   Component Value Date     04/11/2022    K 4.5 04/11/2022     04/11/2022    CO2 26 04/11/2022    BUN 15 04/11/2022    CREATININE 1.0 04/11/2022    GLUCOSE 114 (H) 04/11/2022    CALCIUM 9.7 04/11/2022    PROT 6.8 04/11/2022    LABALBU 4.2 04/11/2022    BILITOT 0.2 04/11/2022    ALKPHOS 85 04/11/2022    AST 16 04/11/2022    ALT 12 04/11/2022    LABGLOM 55 04/11/2022    GFRAA >60 04/11/2022                       ASSESSMENT/PLAN:    1. Change in bowel habit  -     US ABDOMEN COMPLETE; Future  -We discussed the literature recommendations for lower endoscopy with changes in bowel pattern and stool consistency. Patient is agreeable to proceed. Schedule colonoscopy under MAC anesthesia  Literature given. Periprocedural hydration advised. The risks and alternatives were explained as per the ASGE guidelines (I.  E.  Perforation, 3% chance of bleeding, reaction to medication, missed colon lesions,  infections, and death). 2. Constipation, unspecified constipation type  -     US ABDOMEN COMPLETE; Future  -     XR ABDOMEN (KUB) (SINGLE AP VIEW); Future    -Abdominal xray ordered to determine extent of constipation.   -Advised patient to decrease daily use of Metamucil. Will consider Miralax once abdominal xray is reviewed  -Further management pending study results      3. Bloating  -     US ABDOMEN COMPLETE; Future      Return for Follow up with Dr. Shon Tejada post procedure. An electronic signature was used to authenticate this note.     --Cally Borrego, APRN - CNP

## 2022-05-03 ENCOUNTER — HOSPITAL ENCOUNTER (OUTPATIENT)
Dept: ULTRASOUND IMAGING | Age: 72
Discharge: HOME | End: 2022-05-05
Payer: MEDICARE

## 2022-05-03 DIAGNOSIS — K59.00 CONSTIPATION, UNSPECIFIED CONSTIPATION TYPE: ICD-10-CM

## 2022-05-03 DIAGNOSIS — R14.0 BLOATING: ICD-10-CM

## 2022-05-03 DIAGNOSIS — R19.4 CHANGE IN BOWEL HABIT: ICD-10-CM

## 2022-05-03 PROCEDURE — 76700 US EXAM ABDOM COMPLETE: CPT

## 2022-05-11 ENCOUNTER — TELEPHONE (OUTPATIENT)
Dept: GASTROENTEROLOGY | Age: 72
End: 2022-05-11

## 2022-05-11 NOTE — TELEPHONE ENCOUNTER
Prior Authorization Form:      DEMOGRAPHICS:                     Patient Name:  César Arenas  Patient :  1950            Insurance:  Payor: DialedIN / Plan: DialedIN / Product Type: *No Product type* /   Insurance ID Number:    Payor/Plan Subscr  Sex Relation Sub. Ins. ID Effective Group Num   1.  Ryanne Jean 1950 Female Self [id number]                                    [address]         DIAGNOSIS & PROCEDURE:                       Procedure/Operation: Colonoscopy           CPT Code: 19874    Diagnosis:  Change in bowel habits    ICD10 Code: R19.4    Location:  [address] [place] [address]    Surgeon:  Dr. Columba Latham     SCHEDULING INFORMATION:                          Date: 10/24/2022    Time: 1100             Anesthesia:  MAC/TIVA                                                       Status:  Outpatient          Electronically signed by Audra Hernandez MA on 2022 at 1:30 PM

## 2022-05-29 SDOH — HEALTH STABILITY: PHYSICAL HEALTH: ON AVERAGE, HOW MANY MINUTES DO YOU ENGAGE IN EXERCISE AT THIS LEVEL?: 60 MIN

## 2022-05-29 SDOH — HEALTH STABILITY: PHYSICAL HEALTH: ON AVERAGE, HOW MANY DAYS PER WEEK DO YOU ENGAGE IN MODERATE TO STRENUOUS EXERCISE (LIKE A BRISK WALK)?: 6 DAYS

## 2022-05-29 ASSESSMENT — SOCIAL DETERMINANTS OF HEALTH (SDOH)
WITHIN THE LAST YEAR, HAVE YOU BEEN KICKED, HIT, SLAPPED, OR OTHERWISE PHYSICALLY HURT BY YOUR PARTNER OR EX-PARTNER?: NO
WITHIN THE LAST YEAR, HAVE YOU BEEN AFRAID OF YOUR PARTNER OR EX-PARTNER?: NO
WITHIN THE LAST YEAR, HAVE TO BEEN RAPED OR FORCED TO HAVE ANY KIND OF SEXUAL ACTIVITY BY YOUR PARTNER OR EX-PARTNER?: NO
WITHIN THE LAST YEAR, HAVE YOU BEEN HUMILIATED OR EMOTIONALLY ABUSED IN OTHER WAYS BY YOUR PARTNER OR EX-PARTNER?: NO

## 2022-05-31 ENCOUNTER — OFFICE VISIT (OUTPATIENT)
Dept: FAMILY MEDICINE CLINIC | Age: 72
End: 2022-05-31
Payer: MEDICARE

## 2022-05-31 VITALS
OXYGEN SATURATION: 99 % | WEIGHT: 151.6 LBS | HEIGHT: 67 IN | HEART RATE: 81 BPM | SYSTOLIC BLOOD PRESSURE: 123 MMHG | BODY MASS INDEX: 23.79 KG/M2 | DIASTOLIC BLOOD PRESSURE: 76 MMHG | TEMPERATURE: 97.6 F | RESPIRATION RATE: 16 BRPM

## 2022-05-31 DIAGNOSIS — Z11.59 ENCOUNTER FOR HEPATITIS C SCREENING TEST FOR LOW RISK PATIENT: ICD-10-CM

## 2022-05-31 DIAGNOSIS — H35.3111 EARLY DRY STAGE NONEXUDATIVE AGE-RELATED MACULAR DEGENERATION OF RIGHT EYE: ICD-10-CM

## 2022-05-31 DIAGNOSIS — E03.9 PRIMARY HYPOTHYROIDISM: ICD-10-CM

## 2022-05-31 DIAGNOSIS — N18.31 STAGE 3A CHRONIC KIDNEY DISEASE (HCC): ICD-10-CM

## 2022-05-31 DIAGNOSIS — E11.9 TYPE 2 DIABETES MELLITUS WITHOUT COMPLICATION, WITHOUT LONG-TERM CURRENT USE OF INSULIN (HCC): ICD-10-CM

## 2022-05-31 DIAGNOSIS — K59.00 CONSTIPATION, UNSPECIFIED CONSTIPATION TYPE: Primary | ICD-10-CM

## 2022-05-31 DIAGNOSIS — N89.8 VAGINAL DRYNESS: ICD-10-CM

## 2022-05-31 PROBLEM — H35.3191 EARLY DRY STAGE NONEXUDATIVE AGE-RELATED MACULAR DEGENERATION: Status: ACTIVE | Noted: 2020-10-14

## 2022-05-31 PROCEDURE — 1123F ACP DISCUSS/DSCN MKR DOCD: CPT | Performed by: FAMILY MEDICINE

## 2022-05-31 PROCEDURE — 99214 OFFICE O/P EST MOD 30 MIN: CPT | Performed by: FAMILY MEDICINE

## 2022-05-31 PROCEDURE — 3044F HG A1C LEVEL LT 7.0%: CPT | Performed by: FAMILY MEDICINE

## 2022-05-31 RX ORDER — POLYETHYLENE GLYCOL 3350, SODIUM SULFATE ANHYDROUS, SODIUM BICARBONATE, SODIUM CHLORIDE, POTASSIUM CHLORIDE 236; 22.74; 6.74; 5.86; 2.97 G/4L; G/4L; G/4L; G/4L; G/4L
POWDER, FOR SOLUTION ORAL
COMMUNITY
Start: 2022-04-14 | End: 2022-08-17

## 2022-05-31 RX ORDER — ESTRADIOL 0.1 MG/G
1 CREAM VAGINAL DAILY
Qty: 1 EACH | Refills: 3 | Status: SHIPPED | OUTPATIENT
Start: 2022-05-31

## 2022-05-31 SDOH — ECONOMIC STABILITY: FOOD INSECURITY: WITHIN THE PAST 12 MONTHS, THE FOOD YOU BOUGHT JUST DIDN'T LAST AND YOU DIDN'T HAVE MONEY TO GET MORE.: NEVER TRUE

## 2022-05-31 SDOH — ECONOMIC STABILITY: FOOD INSECURITY: WITHIN THE PAST 12 MONTHS, YOU WORRIED THAT YOUR FOOD WOULD RUN OUT BEFORE YOU GOT MONEY TO BUY MORE.: NEVER TRUE

## 2022-05-31 ASSESSMENT — PATIENT HEALTH QUESTIONNAIRE - PHQ9
2. FEELING DOWN, DEPRESSED OR HOPELESS: 0
SUM OF ALL RESPONSES TO PHQ QUESTIONS 1-9: 0
SUM OF ALL RESPONSES TO PHQ QUESTIONS 1-9: 0
1. LITTLE INTEREST OR PLEASURE IN DOING THINGS: 0
SUM OF ALL RESPONSES TO PHQ QUESTIONS 1-9: 0
SUM OF ALL RESPONSES TO PHQ9 QUESTIONS 1 & 2: 0
SUM OF ALL RESPONSES TO PHQ QUESTIONS 1-9: 0

## 2022-05-31 ASSESSMENT — SOCIAL DETERMINANTS OF HEALTH (SDOH): HOW HARD IS IT FOR YOU TO PAY FOR THE VERY BASICS LIKE FOOD, HOUSING, MEDICAL CARE, AND HEATING?: NOT HARD AT ALL

## 2022-05-31 NOTE — PATIENT INSTRUCTIONS
For every cup of coffee you have, be sure to drink at least 8 oz of water. The goal to hit [address] [place] [address] is 64 oz of water. Consume 25-35 g fiber per day from fruits and veggies. Please call every pharmacy around and ask if they provide the Tdap (tetanus, diphtheria, acellular pertussis [whooping cough]) vaccine and how much it costs. If it's affordable please get it and let me know when you've received it. Please call every pharmacy around and ask if they provide the shingles vaccine and how much it costs. If it's affordable please get it and let me know when you've received it.

## 2022-05-31 NOTE — PROGRESS NOTES
FM Progress Note    Subjective:   Hypothyroidism. LT4 50 and 75 mcg. Sees Lalo Gar at [facility] Results   Component Value Date    TSH 4.070 04/11/2022      DM. Metformin. Lab Results   Component Value Date    LABA1C 6.1 (H) 04/11/2022     Constipation. Metamucil daily and prn miralax. Worked up, no concerning findings but trouble emptying out all the way. Insomnia. Chronic. Drinks 3 cups coffee in AM and 1 in PM with 1 glass wine and chocolate. Drinks 8 oz water per day in AM.     CKD. Inadequate fluid intake.  was  then , pt states  is her  and they exercise together regularly (biking, home gym). Grew up on the [employer] Maintenance Due   Topic Date Due    Hepatitis C screen  Never done    DTaP/Tdap/Td vaccine (1 - Tdap) Never done    Shingles vaccine (1 of 2) Never done           Objective:   /76 (Site: Right Upper Arm, Position: Sitting, Cuff Size: Medium Adult)   Pulse 81   Temp 97.6 °F (36.4 °C) (Temporal)   Resp 16   Ht 5' 7\" (1.702 m)   Wt 151 lb 9.6 oz (68.8 kg)   SpO2 99%   BMI 23.74 kg/m²   General appearance: NAD, alert and interacting appropriately  HEENT: NCAT, PERRLA, EOMI   Resp: CTAB, no WRC  CVS: RRR, no MRG  Abdomen: BS +, SNDNT  Extremities: No clubbing, cyanosis, or edema. Warm. Dry. I have reviewed this patient's previous records. I have reviewed this patient's labs. I have reviewed this patient's medications. Assessment/Plan:    Cynthia Patel was seen today for establish care.     Diagnoses and all orders for this visit:    Constipation, unspecified constipation type    Type 2 diabetes mellitus without complication, without long-term current use of insulin (HCC)    Stage 3a chronic kidney disease (HCC)    Early dry stage nonexudative age-related macular degeneration of right eye    Primary hypothyroidism    Encounter for hepatitis C screening test for low risk patient  - Hepatitis C Antibody; Future    Vaginal dryness  -     estradiol (ESTRACE VAGINAL) 0.1 MG/GM vaginal cream; Place 1 g vaginally daily    CPM DM  CPM hypothyroidism          Patient Instructions   For every cup of coffee you have, be sure to drink at least 8 oz of water. The goal to hit [address] is 64 oz of water. Consume 25-35 g fiber per day from fruits and veggies. Please call every pharmacy around and ask if they provide the Tdap (tetanus, diphtheria, acellular pertussis [whooping cough]) vaccine and how much it costs. If it's affordable please get it and let me know when you've received it. Please call every pharmacy around and ask if they provide the shingles vaccine and how much it costs. If it's affordable please get it and let me know when you've received it. Return for Medicare annual wellness visit. Greater than 50% of this 30 minute face-to-face patient encounter was spent counseling and/or care coordination on the following: The primary encounter diagnosis was Constipation, unspecified constipation type. Diagnoses of Type 2 diabetes mellitus without complication, without long-term current use of insulin ([address] Stage 3a chronic kidney disease ([address] Early dry stage nonexudative age-related macular degeneration of right eye, Primary hypothyroidism, Encounter for hepatitis C screening test for low risk patient, and Vaginal dryness were also pertinent to this visit.       Electronically signed by Ambrose Ramsey MD on 5/31/2022 at 9:09 AM

## 2022-08-09 ENCOUNTER — HOSPITAL ENCOUNTER (OUTPATIENT)
Age: 72
Discharge: HOME | End: 2022-08-09
Payer: MEDICARE

## 2022-08-09 DIAGNOSIS — Z11.59 ENCOUNTER FOR HEPATITIS C SCREENING TEST FOR LOW RISK PATIENT: ICD-10-CM

## 2022-08-09 PROCEDURE — 86803 HEPATITIS C AB TEST: CPT

## 2022-08-09 PROCEDURE — 36415 COLL VENOUS BLD VENIPUNCTURE: CPT

## 2022-08-10 LAB — HEPATITIS C ANTIBODY INTERPRETATION: NORMAL

## 2022-08-17 ENCOUNTER — OFFICE VISIT (OUTPATIENT)
Dept: GASTROENTEROLOGY | Age: 72
End: 2022-08-17
Payer: MEDICARE

## 2022-08-17 VITALS
SYSTOLIC BLOOD PRESSURE: 122 MMHG | DIASTOLIC BLOOD PRESSURE: 75 MMHG | HEART RATE: 68 BPM | OXYGEN SATURATION: 98 % | WEIGHT: 149 LBS | RESPIRATION RATE: 16 BRPM | HEIGHT: 67 IN | TEMPERATURE: 97 F | BODY MASS INDEX: 23.39 KG/M2

## 2022-08-17 DIAGNOSIS — R19.4 CHANGE IN BOWEL HABITS: Primary | ICD-10-CM

## 2022-08-17 DIAGNOSIS — Z86.010 HX OF COLONIC POLYPS: ICD-10-CM

## 2022-08-17 DIAGNOSIS — Z98.890 HISTORY OF REPAIR OF RECTOCELE: ICD-10-CM

## 2022-08-17 DIAGNOSIS — K59.02 DYSSYNERGIC DEFECATION: ICD-10-CM

## 2022-08-17 PROCEDURE — 99213 OFFICE O/P EST LOW 20 MIN: CPT | Performed by: STUDENT IN AN ORGANIZED HEALTH CARE EDUCATION/TRAINING PROGRAM

## 2022-08-17 PROCEDURE — 1123F ACP DISCUSS/DSCN MKR DOCD: CPT | Performed by: STUDENT IN AN ORGANIZED HEALTH CARE EDUCATION/TRAINING PROGRAM

## 2022-08-17 NOTE — PROGRESS NOTES
[facility] ([facility])  Gastroenterology, Hepatology &  Advanced Endoscopy     Progress Note      SUBJECTIVE:      Ms. Katrin Hayden is a 71y/F who presents to clinic today in follow-up for irregular bowel habits. The patient describes having several solid stools per day with associated sensation of incomplete evacuation, inability to produce a strong push, and taking several wipes to appropriately clean after stooling. Workup thus far included a KUB which I personally reviewed. A large amount of stool burden was not appreciated. A RUQ US was normal. TSH and Ca were normal on labs. She has a history of an EGD/Colonoscopy in 2015 which showed colon polyps and a Colonoscopy in 2019 with no polyps appreciated. She is currently scheduled for a colonoscopy in October. She has a history of a rectocele s/p repair. She is currently taking a fiber supplement. She denies the presence of blood in her stool. OBJECTIVE      Physical    VITALS:  /75   Pulse 68   Temp 97 °F (36.1 °C) (Temporal)   Resp 16   Ht 5' 7\" (1.702 m)   Wt 149 lb (67.6 kg)   SpO2 98%   BMI 23.34 kg/m²   Physical Exam:  General: Overall well-appearing, NAD  HEENT: PERRLA, EOMI, Anicteric sclera, MMM, no rhinorrhea  Cards: RRR, no LE edema  Resp: Breathing comfortably on room air, good air movement, no use of accessory muscles, no audible wheezing  Abdomen: soft, NT, ND. Extremities: Moves all extremities, no effusions or bruising. Skin: No rashes or jaundice  Neuro: A&O x 3, CN grossly intact, non-focal exam       ASSESSMENT AND PLAN      71y/F w/ history of rectocele s/p repair in the past who presents with frequent stooling with incomplete evacuation. Her surgical history and inability to produced an appropriate push while also having the sensation of incomplete evacuation is most consistent with pelvic floor dysfunction and dyssynergic defecation.      PLAN:  -We will attempt to continue to bulken the stool by taking fiber supplements BID.  -I will obtain a fecal elastase.   -The patient is scheduled for a colonoscopy in October during which I can perform a more detailed XIN.  -I would suggest Biofeedback therapy as I strongly believe that this is mostly an issue with her pelvic floor muscles and dyssynergic defecation. She can try position changes while defecating by having her feet supported in an elevated location. I will see the patient back in clinic following her colonoscopy. Thank you for including us in the care of this patient. Please do not hesitate to contact us with any additional questions or concerns.     Rose Marie Ramirez MD  Gastroenterology/Hepatology  Advanced Endoscopy

## 2022-08-17 NOTE — PROGRESS NOTES
Last Labs: 08/09/2022    Last Imaging:US Abdomen Complete 04/14/2022  FINDINGS:  LIVER: The liver demonstrates normal echogenicity without evidence of  intrahepatic biliary ductal dilatation. BILIARY SYSTEM: Gallbladder is unremarkable without evidence of  pericholecystic fluid, wall thickening or stones. Negative sonographic  Alfaro's sign. Common bile duct is within normal limits measuring 5 mm. KIDNEYS: The kidneys are unremarkable in appearance without evidence of  hydronephrosis. PANCREAS: Visualized portions of the pancreas are unremarkable. SPLEEN: The spleen is unremarkable in appearance. Spleen is within normal  limits in size. IVC: The IVC is patent. AORTA: Aorta is patent without aneurysm. OTHER: No evidence of ascites. IMPRESSION:  Unremarkable abdominal ultrasound. KUB 04/14/2022  FINDINGS:  The bowel gas pattern is unremarkable without evidence of bowel obstruction. No evidence of stomach distention is seen. Abdominal organs are grossly unremarkable. No evidence of radiodensity is  present to suggest presence of renal calculi or gallstones. Bones and soft tissues are unremarkable. IMPRESSION:  Unremarkable AP abdomen.       Last scopes:COLON Dr. Steffanie Burkett 10/01/2019  PREOPERATIVE DIAGNOSIS:  Change in bowel habits; constipation; hx of sessile polyp     POSTOPERATIVE DIAGNOSIS/FINDINGS:  Same + normal colon         Electronically signed by Elsa Quiñonez MA on 8/17/2022 at 7:53 AM

## 2022-09-06 PROBLEM — Z98.890 HISTORY OF REPAIR OF RECTOCELE: Status: ACTIVE | Noted: 2022-09-06

## 2022-09-12 DIAGNOSIS — R19.4 CHANGE IN BOWEL HABITS: ICD-10-CM

## 2022-09-17 LAB — PANCREATIC ELASTASE, FECAL: >800 UG/G

## 2022-10-13 ENCOUNTER — HOSPITAL ENCOUNTER (OUTPATIENT)
Age: 72
Discharge: HOME | End: 2022-10-13
Payer: MEDICARE

## 2022-10-13 DIAGNOSIS — E11.9 TYPE 2 DIABETES MELLITUS WITHOUT COMPLICATION, WITHOUT LONG-TERM CURRENT USE OF INSULIN (HCC): ICD-10-CM

## 2022-10-13 DIAGNOSIS — E03.9 PRIMARY HYPOTHYROIDISM: ICD-10-CM

## 2022-10-13 LAB
ALBUMIN SERPL-MCNC: 4.3 G/DL (ref 3.5–5.2)
ALP BLD-CCNC: 72 U/L (ref 35–104)
ALT SERPL-CCNC: 16 U/L (ref 0–32)
ANION GAP SERPL CALCULATED.3IONS-SCNC: 7 MMOL/L (ref 7–16)
AST SERPL-CCNC: 20 U/L (ref 0–31)
BILIRUB SERPL-MCNC: 0.3 MG/DL (ref 0–1.2)
BUN BLDV-MCNC: 17 MG/DL (ref 6–23)
CALCIUM SERPL-MCNC: 9.9 MG/DL (ref 8.6–10.2)
CHLORIDE BLD-SCNC: 107 MMOL/L (ref 98–107)
CHOLESTEROL, TOTAL: 214 MG/DL (ref 0–199)
CO2: 28 MMOL/L (ref 22–29)
CREAT SERPL-MCNC: 1 MG/DL (ref 0.5–1)
CREATININE URINE: 134 MG/DL (ref 29–226)
GFR AFRICAN AMERICAN: >60
GFR NON-AFRICAN AMERICAN: 55 ML/MIN/1.73
GLUCOSE BLD-MCNC: 115 MG/DL (ref 74–99)
HBA1C MFR BLD: 6.3 % (ref 4–5.6)
HDLC SERPL-MCNC: 77 MG/DL
LDL CHOLESTEROL CALCULATED: 117 MG/DL (ref 0–99)
MICROALBUMIN UR-MCNC: <12 MG/L
MICROALBUMIN/CREAT UR-RTO: ABNORMAL (ref 0–30)
POTASSIUM SERPL-SCNC: 4.6 MMOL/L (ref 3.5–5)
SODIUM BLD-SCNC: 142 MMOL/L (ref 132–146)
T4 FREE: 1.34 NG/DL (ref 0.93–1.7)
TOTAL PROTEIN: 6.6 G/DL (ref 6.4–8.3)
TRIGL SERPL-MCNC: 98 MG/DL (ref 0–149)
TSH SERPL DL<=0.05 MIU/L-ACNC: 3.88 UIU/ML (ref 0.27–4.2)
VLDLC SERPL CALC-MCNC: 20 MG/DL

## 2022-10-13 PROCEDURE — 82570 ASSAY OF URINE CREATININE: CPT

## 2022-10-13 PROCEDURE — 84443 ASSAY THYROID STIM HORMONE: CPT

## 2022-10-13 PROCEDURE — 80053 COMPREHEN METABOLIC PANEL: CPT

## 2022-10-13 PROCEDURE — 36415 COLL VENOUS BLD VENIPUNCTURE: CPT

## 2022-10-13 PROCEDURE — 83036 HEMOGLOBIN GLYCOSYLATED A1C: CPT

## 2022-10-13 PROCEDURE — 82044 UR ALBUMIN SEMIQUANTITATIVE: CPT

## 2022-10-13 PROCEDURE — 84439 ASSAY OF FREE THYROXINE: CPT

## 2022-10-13 PROCEDURE — 80061 LIPID PANEL: CPT

## 2022-10-20 NOTE — PROGRESS NOTES
[facility] PRE-ADMISSION TESTING INSTRUCTIONS    The Preadmission Testing patient is instructed accordingly using the following criteria (check applicable):    ARRIVAL INSTRUCTIONS:  [x] Parking the day of Surgery is located in the Main Entrance lot. Upon entering the door, make an immediate right to the surgery reception desk    [x] Bring photo ID and insurance card    [] Bring in a copy of Living will or Durable Power of  papers. [x] Please be sure to arrange for responsible adult to provide transportation to and from the hospital    [x] Please arrange for responsible adult to be with you for the 24 hour period post procedure due to having anesthesia    [x] If you awake am of surgery not feeling well or have temperature >100 please call [phone]    GENERAL INSTRUCTIONS:    [x] Nothing by mouth after midnight, including gum, candy, mints or water    [x] You may brush your teeth, but do not swallow any water    [] Take medications as instructed with 1-2 oz of water    [x] Stop herbal supplements and vitamins 5 days prior to procedure    [] Follow preop dosing of blood thinners per physician instructions    [] Take 1/2 dose of evening insulin, but no insulin after midnight    [x] No oral diabetic medications after midnight    [x] If diabetic and have low blood sugar or feel symptomatic, take 1-2oz apple juice only    [] Bring inhalers day of surgery    [] Bring C-PAP/ Bi-Pap day of surgery    [] Bring urine specimen day of surgery    [x] Shower or bath with soap, lather and rinse well, AM of Surgery, no lotion, powders or creams to surgical site    [x] Follow bowel prep as instructed per surgeon    [] No tobacco products within 24 hours of surgery     [x] No alcohol or illegal drug use within 24 hours of surgery.     [x] Jewelry, body piercing's, eyeglasses, contact lenses and dentures are not permitted into surgery (bring cases)      [x] Please do not wear any nail polish, make up or hair products on the day of surgery    [x] You can expect a call the business day prior to procedure to notify you if your arrival time changes    [x] If you receive a survey after surgery we would greatly appreciate your comments    [] Parent/guardian of a minor must accompany their child and remain on the premises  the entire time they are under our care     [] Pediatric patients may bring favorite toy, blanket or comfort item with them    [] A caregiver or family member must remain with the patient during their stay if they are mentally handicapped, have dementia, disoriented or unable to use a call light or would be a safety concern if left unattended    [x] Please notify surgeon if you develop any illness between now and time of surgery (cold, cough, sore throat, fever, nausea, vomiting) or any signs of infections  including skin, wounds, and dental.    [x]  The Outpatient Pharmacy is available to fill your prescription here on your day of surgery, ask your preop nurse for details    [] Other instructions    EDUCATIONAL MATERIALS PROVIDED:    [] PAT Preoperative Education Packet/Booklet     [] Medication List    [] Transfusion bracelet applied with instructions    [] Shower with soap, lather and rinse well, and use CHG wipes provided the evening before surgery as instructed    [] Incentive spirometer with instructions

## 2022-10-24 ENCOUNTER — ANESTHESIA EVENT (OUTPATIENT)
Dept: ENDOSCOPY | Age: 72
End: 2022-10-24
Payer: MEDICARE

## 2022-10-24 ENCOUNTER — ANESTHESIA (OUTPATIENT)
Dept: ENDOSCOPY | Age: 72
End: 2022-10-24
Payer: MEDICARE

## 2022-10-24 ENCOUNTER — HOSPITAL ENCOUNTER (OUTPATIENT)
Age: 72
Setting detail: OUTPATIENT SURGERY
Discharge: HOME | End: 2022-10-24
Attending: STUDENT IN AN ORGANIZED HEALTH CARE EDUCATION/TRAINING PROGRAM | Admitting: STUDENT IN AN ORGANIZED HEALTH CARE EDUCATION/TRAINING PROGRAM
Payer: MEDICARE

## 2022-10-24 VITALS
DIASTOLIC BLOOD PRESSURE: 78 MMHG | TEMPERATURE: 97.1 F | SYSTOLIC BLOOD PRESSURE: 137 MMHG | BODY MASS INDEX: 23.3 KG/M2 | OXYGEN SATURATION: 99 % | RESPIRATION RATE: 14 BRPM | HEIGHT: 66 IN | WEIGHT: 145 LBS | HEART RATE: 66 BPM

## 2022-10-24 DIAGNOSIS — R19.4 CHANGE IN BOWEL HABITS: ICD-10-CM

## 2022-10-24 LAB — METER GLUCOSE: 96 MG/DL (ref 74–99)

## 2022-10-24 PROCEDURE — 2709999900 HC NON-CHARGEABLE SUPPLY: Performed by: STUDENT IN AN ORGANIZED HEALTH CARE EDUCATION/TRAINING PROGRAM

## 2022-10-24 PROCEDURE — 7100000010 HC PHASE II RECOVERY - FIRST 15 MIN: Performed by: STUDENT IN AN ORGANIZED HEALTH CARE EDUCATION/TRAINING PROGRAM

## 2022-10-24 PROCEDURE — 88305 TISSUE EXAM BY PATHOLOGIST: CPT

## 2022-10-24 PROCEDURE — 3700000000 HC ANESTHESIA ATTENDED CARE: Performed by: STUDENT IN AN ORGANIZED HEALTH CARE EDUCATION/TRAINING PROGRAM

## 2022-10-24 PROCEDURE — 6360000002 HC RX W HCPCS

## 2022-10-24 PROCEDURE — 7100000011 HC PHASE II RECOVERY - ADDTL 15 MIN: Performed by: STUDENT IN AN ORGANIZED HEALTH CARE EDUCATION/TRAINING PROGRAM

## 2022-10-24 PROCEDURE — 3609010600 HC COLONOSCOPY POLYPECTOMY SNARE/COLD BIOPSY: Performed by: STUDENT IN AN ORGANIZED HEALTH CARE EDUCATION/TRAINING PROGRAM

## 2022-10-24 PROCEDURE — 2500000003 HC RX 250 WO HCPCS

## 2022-10-24 PROCEDURE — 2580000003 HC RX 258: Performed by: STUDENT IN AN ORGANIZED HEALTH CARE EDUCATION/TRAINING PROGRAM

## 2022-10-24 PROCEDURE — 82962 GLUCOSE BLOOD TEST: CPT

## 2022-10-24 PROCEDURE — 3700000001 HC ADD 15 MINUTES (ANESTHESIA): Performed by: STUDENT IN AN ORGANIZED HEALTH CARE EDUCATION/TRAINING PROGRAM

## 2022-10-24 RX ORDER — SODIUM CHLORIDE 0.9 % (FLUSH) 0.9 %
5-40 SYRINGE (ML) INJECTION EVERY 12 HOURS SCHEDULED
Status: CANCELLED | OUTPATIENT
Start: 2022-10-24

## 2022-10-24 RX ORDER — SODIUM CHLORIDE 0.9 % (FLUSH) 0.9 %
5-40 SYRINGE (ML) INJECTION PRN
Status: DISCONTINUED | OUTPATIENT
Start: 2022-10-24 | End: 2022-10-24 | Stop reason: HOSPADM

## 2022-10-24 RX ORDER — ONDANSETRON 4 MG/1
4 TABLET, ORALLY DISINTEGRATING ORAL EVERY 8 HOURS PRN
Status: CANCELLED | OUTPATIENT
Start: 2022-10-24

## 2022-10-24 RX ORDER — SODIUM CHLORIDE 0.9 % (FLUSH) 0.9 %
5-40 SYRINGE (ML) INJECTION PRN
Status: CANCELLED | OUTPATIENT
Start: 2022-10-24

## 2022-10-24 RX ORDER — ATROPINE SULFATE 0.4 MG/ML
AMPUL (ML) INJECTION PRN
Status: DISCONTINUED | OUTPATIENT
Start: 2022-10-24 | End: 2022-10-24 | Stop reason: SDUPTHER

## 2022-10-24 RX ORDER — PROPOFOL 10 MG/ML
INJECTION, EMULSION INTRAVENOUS PRN
Status: DISCONTINUED | OUTPATIENT
Start: 2022-10-24 | End: 2022-10-24 | Stop reason: SDUPTHER

## 2022-10-24 RX ORDER — SODIUM CHLORIDE 0.9 % (FLUSH) 0.9 %
5-40 SYRINGE (ML) INJECTION EVERY 12 HOURS SCHEDULED
Status: DISCONTINUED | OUTPATIENT
Start: 2022-10-24 | End: 2022-10-24 | Stop reason: HOSPADM

## 2022-10-24 RX ORDER — ONDANSETRON 2 MG/ML
4 INJECTION INTRAMUSCULAR; INTRAVENOUS EVERY 6 HOURS PRN
Status: CANCELLED | OUTPATIENT
Start: 2022-10-24

## 2022-10-24 RX ORDER — SODIUM CHLORIDE 9 MG/ML
INJECTION, SOLUTION INTRAVENOUS PRN
Status: CANCELLED | OUTPATIENT
Start: 2022-10-24

## 2022-10-24 RX ORDER — GLYCOPYRROLATE 0.2 MG/ML
INJECTION INTRAMUSCULAR; INTRAVENOUS PRN
Status: DISCONTINUED | OUTPATIENT
Start: 2022-10-24 | End: 2022-10-24 | Stop reason: SDUPTHER

## 2022-10-24 RX ORDER — SODIUM CHLORIDE 9 MG/ML
25 INJECTION, SOLUTION INTRAVENOUS PRN
Status: DISCONTINUED | OUTPATIENT
Start: 2022-10-24 | End: 2022-10-24 | Stop reason: HOSPADM

## 2022-10-24 RX ADMIN — SODIUM CHLORIDE: 9 INJECTION, SOLUTION INTRAVENOUS at 11:35

## 2022-10-24 RX ADMIN — PROPOFOL 600 MG: 10 INJECTION, EMULSION INTRAVENOUS at 11:50

## 2022-10-24 RX ADMIN — GLYCOPYRROLATE 0.2 MG: 0.2 INJECTION, SOLUTION INTRAMUSCULAR; INTRAVENOUS at 12:06

## 2022-10-24 RX ADMIN — ATROPINE SULFATE 0.4 MG: 0.4 INJECTION, SOLUTION INTRAMUSCULAR; INTRAVENOUS; SUBCUTANEOUS at 12:07

## 2022-10-24 ASSESSMENT — LIFESTYLE VARIABLES: SMOKING_STATUS: 0

## 2022-10-24 ASSESSMENT — PAIN SCALES - GENERAL: PAINLEVEL_OUTOF10: 0

## 2022-10-24 ASSESSMENT — PAIN DESCRIPTION - DESCRIPTORS: DESCRIPTORS: CRAMPING

## 2022-10-24 ASSESSMENT — PAIN - FUNCTIONAL ASSESSMENT: PAIN_FUNCTIONAL_ASSESSMENT: 0-10

## 2022-10-24 NOTE — ANESTHESIA PRE PROCEDURE
Department of Anesthesiology  Preprocedure Note       Name:  Darwin Love   Age:  70 y.o.  :  1950                                          MRN:  [id number]         Date:  10/24/2022      Surgeon: Jim Ponce):  Jonathan Senior MD    Procedure: Procedure(s):  COLONOSCOPY DIAGNOSTIC    Medications prior to admission:   Prior to Admission medications    Medication Sig Start Date End Date Taking? Authorizing Provider   lovastatin (MEVACOR) 40 MG tablet TAKE ONE TABLET BY MOUTH EVERY DAY 10/18/22   GIANNA Bhatt CNP   levothyroxine (SYNTHROID) 50 MCG tablet TAKE 1 TABLET BY MOUTH ON MONDAY, TUESDAY, AND WEDNESDAY. 10/18/22   GIANNA Bhatt CNP   levothyroxine (SYNTHROID) 75 MCG tablet Take 1 tablet by mouth See Admin Instructions Thursday-Velasquez 10/18/22   GIANNA Bhatt CNP   metFORMIN (GLUCOPHAGE) 1000 MG tablet Take 1 tablet by mouth 2 times daily (with meals) 10/18/22   GIANNA Bhatt CNP   Bioflavonoid Products (LYN-C) 500-550 MG TABS  22   Historical Provider, MD   estradiol (ESTRACE VAGINAL) 0.1 MG/GM vaginal cream Place 1 g vaginally daily 22   Nithin Marie MD   BIOTIN PO Take 1 capsule by mouth daily    Historical Provider, MD   FREESTYLE LITE strip TEST DAILY AS NEEDED 22   GIANNA Ortega CNP   FreeStyle Lancets MISC USE ONCE A DAY.  10/12/21   Jennifer Ann MD   Multiple Vitamins-Minerals (PRESERVISION AREDS 2+MULTI VIT PO) Take by mouth    Historical Provider, MD   Omega-3 Fatty Acids ([employer] NATURAL FISH OIL PO) Take by mouth    Historical Provider, MD   Blood Glucose Monitoring Suppl (FREESTYLE FREEDOM LITE) w/Device KIT 1 kit by Does not apply route daily 8/3/20   Jennifer Ann MD   Polyethylene Glycol 3350 (MIRALAX PO) Take by mouth Indications: PRN Pt takes a teaspoon daily    Historical Provider, MD   Psyllium (METAMUCIL FIBER PO) Take by mouth    Historical Provider, MD   B Complex Vitamins (VITAMIN B COMPLEX PO) Take by mouth 2 times daily     Historical Provider, MD   Cholecalciferol (VITAMIN D3) 1000 UNITS TABS Take by mouth daily    Historical Provider, MD       Current medications:    No current facility-administered medications for this encounter. Allergies:     Allergies   Allergen Reactions    Seasonal      Post-nasal drip   Headaches  Ear aches       Problem List:    Patient Active Problem List   Diagnosis Code    Primary hypothyroidism E03.9    Type 2 diabetes mellitus without complication, without long-term current use of insulin ([address] E11.9    Diabetes mellitus type 2 without retinopathy ([address] E11.9    Age-related nuclear cataract of both eyes H25.13    Constipation K59.00    Myopia of both eyes with astigmatism and presbyopia H52.13, H52.203, H52.4    Epiretinal membrane (ERM) of left eye H35.372    Dyslipidemia E78.5    Dyssynergic defecation K59.02    Hx of colonic polyps Z86.010    Early dry stage nonexudative age-related macular degeneration H35.3191    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 ([address] E11.69, G98.8    Diabetic polyneuropathy (HCC) E11.42    History of repair of rectocele Z98.890       Past Medical History:        Diagnosis Date    Actinic keratosis     Diabetes ([address]     Early dry stage nonexudative age-related macular degeneration     Hyperlipidemia     Hypothyroidism     Lichen sclerosus     Prolonged emergence from general anesthesia     Rotator cuff impingement syndrome     Spinal arthritis     Spinal stenosis     Vertigo        Past Surgical History:        Procedure Laterality Date    BREAST BIOPSY      Stereotactic    COLONOSCOPY      COLONOSCOPY  10/01/2019    normal--christopher    COLONOSCOPY N/A 10/01/2019    COLONOSCOPY DIAGNOSTIC performed by Maico Fregoso MD at [facility]      HYSTERECTOMY (CERVIX STATUS UNKNOWN)      cvx gone    MOHS SURGERY Right     right leg SCC    RECTOCELE REPAIR      THYROIDECTOMY, PARTIAL  01/2008    TUBAL LIGATION         Social History:    Social History     Tobacco Use    Smoking status: Never    Smokeless tobacco: Never   Substance Use Topics    Alcohol use: Yes     Alcohol/week: 7.0 standard drinks     Types: 7 Glasses of wine per week     Comment: daily                                Counseling given: Not Answered      Vital Signs (Current):   Vitals:    10/20/22 0946   Weight: 145 lb (65.8 kg)   Height: 5' 6\" (1.676 m)                                              BP Readings from Last 3 Encounters:   10/18/22 115/60   08/17/22 122/75   05/31/22 123/76       NPO Status:                                                                                 BMI:   Wt Readings from Last 3 Encounters:   10/20/22 145 lb (65.8 kg)   10/18/22 150 lb (68 kg)   08/17/22 149 lb (67.6 kg)     Body mass index is 23.4 kg/m². CBC:   Lab Results   Component Value Date/Time    WBC 5.0 06/14/2018 08:26 AM    RBC 4.68 06/14/2018 08:26 AM    HGB 14.1 06/14/2018 08:26 AM    HCT 41.9 06/14/2018 08:26 AM    MCV 89.5 06/14/2018 08:26 AM    RDW 12.4 06/14/2018 08:26 AM     06/14/2018 08:26 AM       CMP:   Lab Results   Component Value Date/Time     10/13/2022 07:55 AM    K 4.6 10/13/2022 07:55 AM     10/13/2022 07:55 AM    CO2 28 10/13/2022 07:55 AM    BUN 17 10/13/2022 07:55 AM    CREATININE 1.0 10/13/2022 07:55 AM    GFRAA >60 10/13/2022 07:55 AM    LABGLOM 55 10/13/2022 07:55 AM    GLUCOSE 115 10/13/2022 07:55 AM    PROT 6.6 10/13/2022 07:55 AM    CALCIUM 9.9 10/13/2022 07:55 AM    BILITOT 0.3 10/13/2022 07:55 AM    ALKPHOS 72 10/13/2022 07:55 AM    AST 20 10/13/2022 07:55 AM    ALT 16 10/13/2022 07:55 AM       POC Tests: No results for input(s): POCGLU, POCNA, POCK, POCCL, POCBUN, POCHEMO, POCHCT in the last 72 hours.     Coags: No results found for: PROTIME, INR, APTT    HCG (If Applicable): No results found for: PREGTESTUR, PREGSERUM, HCG, HCGQUANT     ABGs: No results found for: PHART, PO2ART, BLJ7DIP, NLY5MEL, BEART, S8WJTVEA     Type & Screen (If Applicable):  No results found for: LABABO, LABRH    Drug/Infectious Status (If Applicable):  No results found for: HIV, HEPCAB    COVID-19 Screening (If Applicable): No results found for: COVID19        Anesthesia Evaluation  Patient summary reviewed and Nursing notes reviewed no history of anesthetic complications:   Airway: Mallampati: II  TM distance: >3 FB   Neck ROM: full  Mouth opening: > = 3 FB   Dental:    (+) caps      Pulmonary:Negative Pulmonary ROS breath sounds clear to auscultation      (-) not a current smoker                           Cardiovascular:  Exercise tolerance: good (>4 METS),   (+) hyperlipidemia        Rhythm: regular  Rate: normal           Beta Blocker:  Not on Beta Blocker         Neuro/Psych:   (+) neuromuscular disease:,              ROS comment: neuropathy GI/Hepatic/Renal:   (+) bowel prep,           Endo/Other:    (+) DiabetesType II DM, , hypothyroidism: arthritis:., .                 Abdominal:             Vascular: negative vascular ROS. Other Findings:           Anesthesia Plan      MAC     ASA 3       Induction: intravenous. Anesthetic plan and risks discussed with patient and spouse.                         Ariella Brunson MD   10/24/2022

## 2022-10-24 NOTE — H&P
[facility] ([facility])   Gastroenterology, Hepatology, &  Advanced Endoscopy    Consult       ASSESSMENT AND PLAN:    71y/F who presents for colonoscopy in the setting of irregular bowel habits with several stools per day and sensation of incomplete evacuation. She has a history of a rectocele s/p repair. PLAN:  Colonoscopy with possible biopsy and polypectomy        HISTORY OF PRESENT ILLNESS:      Ms. Ken Chavez is a 71y/F who presents for irregular bowel habits. The patient describes having several solid stools per day with associated sensation of incomplete evacuation, inability to produce a strong push, and taking several wipes to appropriately clean after stooling. Workup thus far included a KUB which I personally reviewed. A large amount of stool burden was not appreciated. A RUQ US was normal. TSH and Ca were normal on labs. She has a history of an EGD/Colonoscopy in 2015 which showed colon polyps and a Colonoscopy in 2019 with no polyps appreciated. She is currently scheduled for a colonoscopy in October. She has a history of a rectocele s/p repair. She is currently taking a fiber supplement. She denies the presence of blood in her stool.      Past Medical History:        Diagnosis Date    Actinic keratosis     Diabetes ([facility] [address]     Early dry stage nonexudative age-related macular degeneration     Hyperlipidemia     Hypothyroidism     Lichen sclerosus     Prolonged emergence from general anesthesia     Rotator cuff impingement syndrome     Spinal arthritis     Spinal stenosis     Vertigo      Past Surgical History:        Procedure Laterality Date    BREAST BIOPSY      Stereotactic    COLONOSCOPY      COLONOSCOPY  10/01/2019    normal--christopher    COLONOSCOPY N/A 10/01/2019    COLONOSCOPY DIAGNOSTIC performed by Severiano Sox, MD at [place] [address] (CERVIX STATUS UNKNOWN)      cvx gone    MOHS SURGERY Right     right leg SCC    RECTOCELE REPAIR THYROIDECTOMY, PARTIAL  01/2008    TUBAL LIGATION       Social History:    TOBACCO:   reports that she has never smoked. She has never used smokeless tobacco.  ETOH:   reports current alcohol use of about 7.0 standard drinks per week. DRUGS:   reports no history of drug use. Family History:       Problem Relation Age of Onset    Cancer Mother         pancreatic    Diabetes Father     Cancer Father         Prostate    Heart Failure Father     Stroke Father         due to medical intervention    Cancer Sister         Breast CA x 3    Kidney Disease Maternal Grandmother     Heart Disease Maternal Grandfather     Heart Attack Maternal Grandfather     Cancer Paternal Grandmother     Heart Failure Paternal Grandfather     Other Other         No [address] [place]       No current facility-administered medications for this encounter.     Current Outpatient Medications:     lovastatin (MEVACOR) 40 MG tablet, TAKE ONE TABLET BY MOUTH EVERY DAY, Disp: 90 tablet, Rfl: 2    levothyroxine (SYNTHROID) 50 MCG tablet, TAKE 1 TABLET BY MOUTH ON MONDAY, TUESDAY, AND WEDNESDAY., Disp: 90 tablet, Rfl: 3    levothyroxine (SYNTHROID) 75 MCG tablet, Take 1 tablet by mouth See Admin Instructions Thursday-Sunday, Disp: 90 tablet, Rfl: 5    metFORMIN (GLUCOPHAGE) 1000 MG tablet, Take 1 tablet by mouth 2 times daily (with meals), Disp: 180 tablet, Rfl: 3    Bioflavonoid Products (LYN-C) 500-550 MG TABS, , Disp: , Rfl:     estradiol (ESTRACE VAGINAL) 0.1 MG/GM vaginal cream, Place 1 g vaginally daily, Disp: 1 each, Rfl: 3    BIOTIN PO, Take 1 capsule by mouth daily, Disp: , Rfl:     FREESTYLE LITE strip, TEST DAILY AS NEEDED, Disp: 100 strip, Rfl: 3    FreeStyle Lancets MISC, USE ONCE A DAY., Disp: 100 each, Rfl: 5    Multiple Vitamins-Minerals (PRESERVISION AREDS 2+MULTI VIT PO), Take by mouth, Disp: , Rfl:     Omega-3 Fatty Acids ([employer] NATURAL FISH OIL PO), Take by mouth, Disp: , Rfl:     Blood Glucose Monitoring Suppl (FREESTYLE FREEDOM LITE) w/Device KIT, 1 kit by Does not apply route daily, Disp: 1 kit, Rfl: 0    Polyethylene Glycol 3350 (MIRALAX PO), Take by mouth Indications: PRN Pt takes a teaspoon daily, Disp: , Rfl:     Psyllium (METAMUCIL FIBER PO), Take by mouth, Disp: , Rfl:     B Complex Vitamins (VITAMIN B COMPLEX PO), Take by mouth 2 times daily , Disp: , Rfl:     Cholecalciferol (VITAMIN D3) 1000 UNITS TABS, Take by mouth daily, Disp: , Rfl:      Allergies:  Seasonal    ROS:  General: Patient denies n/v/f/c or weight loss. HEENT: Patient denies persistent postnasal drip, scleral icterus, drooling, persistent bleeding from nose/mouth. Resp: Patient denies SOB, wheezing, productive cough. Cards: Patient denies CP, palpitations, significant edema  GI: As above. Derm: Patient denies jaundice/rashes. Musc: Patient denies diffuse/irregular joint swelling or myalgias. PHYSICAL EXAM:  Ht 5' 6\" (1.676 m)   Wt 145 lb (65.8 kg)   BMI 23.40 kg/m²   Physical Exam:  General: Overall well-appearing, NAD  HEENT: PERRLA, EOMI, Anicteric sclera, MMM, no rhinorrhea  Cards: RRR, no LE edema  Resp: Breathing comfortably on room air, good air movement, no use of accessory muscles, no audible wheezing  Abdomen: soft, NT, ND. Extremities: Moves all extremities, no effusions or bruising.   Skin: No rashes or jaundice  Neuro: A&O x 3, CN grossly intact, non-focal exam               Electronically signed by Julienne Gilmore MD on 10/24/2022 at 5:18 AM

## 2022-10-24 NOTE — ANESTHESIA POSTPROCEDURE EVALUATION
Department of Anesthesiology  Postprocedure Note    Patient: Cullen Turner  MRN: [id number]  YOB: 1950  Date of evaluation: 10/24/2022      Procedure Summary     Date: 10/24/22 Room / Location: [facility] 01 / 106 [facility]    Anesthesia Start: 1130 Anesthesia Stop: 1250    Procedure: COLONOSCOPY POLYPECTOMY SNARE/COLD BIOPSY Diagnosis:       Change in bowel habits      (CHANGE IN BOWEL HABITS)    Surgeons: Janna Marion MD Responsible Provider: Nasima Diaz MD    Anesthesia Type: MAC ASA Status: 3          Anesthesia Type: No value filed.     Angie Phase I: Angie Score: 10    Angie Phase II: Angie Score: 10      Anesthesia Post Evaluation    Patient location during evaluation: PACU  Patient participation: complete - patient participated  Level of consciousness: awake and alert  Pain score: 0  Airway patency: patent  Nausea & Vomiting: no nausea and no vomiting  Complications: no  Cardiovascular status: blood pressure returned to baseline  Respiratory status: acceptable  Hydration status: euvolemic

## 2022-10-24 NOTE — OP NOTE
Operative Note      Patient: Kaylee Neri  YOB: 1950  MRN: [id number]    Date of Procedure: 10/24/2022    Pre-Op Diagnosis: Irregular Bowel Habits    Post-Op Diagnosis: Colon Polyp, Diverticulosis       Procedure(s):  COLONOSCOPY POLYPECTOMY SNARE/COLD BIOPSY  COLONOSCOPY WITH BIOPSY    Surgeon(s):  Maeve Smith MD    Assistant:   * No surgical staff found *    Anesthesia: Monitor Anesthesia Care    Estimated Blood Loss (mL): Minimal    Complications: None    Specimens:   ID Type Source Tests Collected by Time Destination   A : ASCENDING COLON POLYPECTOMY  Tissue Colon SURGICAL PATHOLOGY Maeve Smith MD 10/24/2022 1220    B : RANDOM COLON BIOPSIES R/O MICROSCOPIC COLITIS Tissue Colon SURGICAL PATHOLOGY Maeve Smith MD 10/24/2022 1243        Implants:  * No implants in log *      Drains: * No LDAs found *    Indications:  71y/F who presents for colonoscopy in the setting of irregular bowel habits with several stools per day and sensation of incomplete evacuation. She has a history of a rectocele s/p repair. Findings:     Tortuous colon. Normal colonic mucosa. Random biopsies obtained throughout to rule out microscopic colitis. One polyp in ascending colon resected with a cold snare. Severe diverticulosis in descending/sigmoid colon with associated with luminal tortuosity. Detailed Description of Procedure:   Pre-Anesthesia Assessment:  Prior to the procedure, a history and physical was performed and patient medications and allergies were reviewed. The risks, benefits, complications, treatment options and expected outcomes were discussed with the patient. The possibilities of reaction to medication, pulmonary aspiration, perforation of the gastrointestinal tract, bleeding requiring transfusion or operation, respiratory failure requiring placement on a ventilator, and failure to diagnose a condition or identify polyps/lesions were discussed with the patient.  All questions were answered and

## 2022-10-24 NOTE — DISCHARGE INSTRUCTIONS
Recommendations:  -The patient will be observed post procedure until all discharge criteria are met. -Patient has a contact number available for emergencies. The signs and symptoms of potential delayed complications were discussed with the patient.    -Return to normal activities tomorrow. -Written discharge instructions were provided to the patient.    -Await pathology results.  -Timeframe until next colonoscopy will be discussed in clinic and based on Clinical Guidelines regarding size, number, and pathology of polyps removed as well as adequacy of bowel prep.   -Clinic appointment scheduled 11/2.

## 2022-11-02 ENCOUNTER — OFFICE VISIT (OUTPATIENT)
Dept: GASTROENTEROLOGY | Age: 72
End: 2022-11-02

## 2022-11-02 VITALS
BODY MASS INDEX: 23.4 KG/M2 | OXYGEN SATURATION: 98 % | WEIGHT: 145 LBS | DIASTOLIC BLOOD PRESSURE: 71 MMHG | HEART RATE: 68 BPM | SYSTOLIC BLOOD PRESSURE: 133 MMHG

## 2022-11-02 DIAGNOSIS — Z86.010 HX OF COLONIC POLYPS: Primary | ICD-10-CM

## 2022-11-02 DIAGNOSIS — K58.2 IRRITABLE BOWEL SYNDROME WITH BOTH CONSTIPATION AND DIARRHEA: ICD-10-CM

## 2022-11-02 NOTE — PROGRESS NOTES
[facility] ([facility])  Gastroenterology, Hepatology &  Advanced Endoscopy     Progress Note    SUBJECTIVE:      Ms. Anibal Quinn is a 72y/F who presents to clinic in follow-up after colonoscopy for persistent diarrhea. Findings:      Tortuous colon. Normal colonic mucosa. Random biopsies obtained throughout to rule out microscopic colitis. One polyp in ascending colon resected with a cold snare. Severe diverticulosis in descending/sigmoid colon with associated with luminal tortuosity. Pathology:  A. Colon, ascending polypectomy:   Incipient tubular adenoma. B.  Colon, random biopsy:   Multiple fragments of colonic mucosa with no significant pathologic   findings. Intramucosal lymphoid aggregates present. She continues to have alternating bowel habits. OBJECTIVE      Physical    VITALS:  /71   Pulse 68   Wt 145 lb (65.8 kg)   SpO2 98%   BMI 23.40 kg/m²   Physical Exam:  General: Overall well-appearing, NAD  HEENT: PERRLA, EOMI, Anicteric sclera, MMM, no rhinorrhea  Cards: RRR, no LE edema  Resp: Breathing comfortably on room air, good air movement, no use of accessory muscles, no audible wheezing  Abdomen: soft, NT, ND. Extremities: Moves all extremities, no effusions or bruising. Skin: No rashes or jaundice  Neuro: A&O x 3, CN grossly intact, non-focal exam       ASSESSMENT AND PLAN      72y/F who presents to clinic in follow-up after colonoscopy for evaluation of irregular bowel habits. PLAN:  Irritable Bowel Syndrome:  - Colonoscopy normal with biopsy negative for microscopic colitis. - Titrate Metamucil and Miralax to bowel movements with goal of regular, formed stool passed q 24-48hrs without straining or incomplete evacuation. 2. Colon Polyp:  - Will be due for surveillance in 7 years. I will see the patient in follow-up in 6 months. Thank you for including us in the care of this patient.  Please do not hesitate to contact us with any additional questions or concerns.     Irineo Montenegro MD  Gastroenterology/Hepatology  Advanced Endoscopy

## 2022-12-07 PROBLEM — K58.2 IRRITABLE BOWEL SYNDROME WITH BOTH CONSTIPATION AND DIARRHEA: Status: ACTIVE | Noted: 2022-12-07

## 2022-12-15 SDOH — HEALTH STABILITY: PHYSICAL HEALTH: ON AVERAGE, HOW MANY MINUTES DO YOU ENGAGE IN EXERCISE AT THIS LEVEL?: 60 MIN

## 2022-12-15 SDOH — HEALTH STABILITY: PHYSICAL HEALTH: ON AVERAGE, HOW MANY DAYS PER WEEK DO YOU ENGAGE IN MODERATE TO STRENUOUS EXERCISE (LIKE A BRISK WALK)?: 5 DAYS

## 2022-12-15 ASSESSMENT — PATIENT HEALTH QUESTIONNAIRE - PHQ9
SUM OF ALL RESPONSES TO PHQ QUESTIONS 1-9: 0
SUM OF ALL RESPONSES TO PHQ QUESTIONS 1-9: 0
SUM OF ALL RESPONSES TO PHQ9 QUESTIONS 1 & 2: 0
SUM OF ALL RESPONSES TO PHQ QUESTIONS 1-9: 0
2. FEELING DOWN, DEPRESSED OR HOPELESS: 0
SUM OF ALL RESPONSES TO PHQ QUESTIONS 1-9: 0
1. LITTLE INTEREST OR PLEASURE IN DOING THINGS: 0

## 2022-12-15 ASSESSMENT — LIFESTYLE VARIABLES
HOW OFTEN DURING THE LAST YEAR HAVE YOU FOUND THAT YOU WERE NOT ABLE TO STOP DRINKING ONCE YOU HAD STARTED: 0
HOW MANY STANDARD DRINKS CONTAINING ALCOHOL DO YOU HAVE ON A TYPICAL DAY: 1
HOW OFTEN DURING THE LAST YEAR HAVE YOU NEEDED AN ALCOHOLIC DRINK FIRST THING IN THE MORNING TO GET YOURSELF GOING AFTER A NIGHT OF HEAVY DRINKING: NEVER
HOW OFTEN DO YOU HAVE A DRINK CONTAINING ALCOHOL: 5
HOW OFTEN DURING THE LAST YEAR HAVE YOU FOUND THAT YOU WERE NOT ABLE TO STOP DRINKING ONCE YOU HAD STARTED: NEVER
HOW OFTEN DO YOU HAVE SIX OR MORE DRINKS ON ONE OCCASION: 1
HOW OFTEN DO YOU HAVE A DRINK CONTAINING ALCOHOL: 4 OR MORE TIMES A WEEK
HOW OFTEN DURING THE LAST YEAR HAVE YOU BEEN UNABLE TO REMEMBER WHAT HAPPENED THE NIGHT BEFORE BECAUSE YOU HAD BEEN DRINKING: NEVER
HOW OFTEN DURING THE LAST YEAR HAVE YOU FAILED TO DO WHAT WAS NORMALLY EXPECTED FROM YOU BECAUSE OF DRINKING: NEVER
HAVE YOU OR SOMEONE ELSE BEEN INJURED AS A RESULT OF YOUR DRINKING: 0
HOW OFTEN DURING THE LAST YEAR HAVE YOU HAD A FEELING OF GUILT OR REMORSE AFTER DRINKING: 0
HAS A RELATIVE, FRIEND, DOCTOR, OR ANOTHER HEALTH PROFESSIONAL EXPRESSED CONCERN ABOUT YOUR DRINKING OR SUGGESTED YOU CUT DOWN: NO
HAVE YOU OR SOMEONE ELSE BEEN INJURED AS A RESULT OF YOUR DRINKING: NO
HOW OFTEN DURING THE LAST YEAR HAVE YOU NEEDED AN ALCOHOLIC DRINK FIRST THING IN THE MORNING TO GET YOURSELF GOING AFTER A NIGHT OF HEAVY DRINKING: 0
HOW OFTEN DURING THE LAST YEAR HAVE YOU FAILED TO DO WHAT WAS NORMALLY EXPECTED FROM YOU BECAUSE OF DRINKING: 0
HOW MANY STANDARD DRINKS CONTAINING ALCOHOL DO YOU HAVE ON A TYPICAL DAY: 1 OR 2
HAS A RELATIVE, FRIEND, DOCTOR, OR ANOTHER HEALTH PROFESSIONAL EXPRESSED CONCERN ABOUT YOUR DRINKING OR SUGGESTED YOU CUT DOWN: 0
HOW OFTEN DURING THE LAST YEAR HAVE YOU HAD A FEELING OF GUILT OR REMORSE AFTER DRINKING: NEVER
HOW OFTEN DURING THE LAST YEAR HAVE YOU BEEN UNABLE TO REMEMBER WHAT HAPPENED THE NIGHT BEFORE BECAUSE YOU HAD BEEN DRINKING: 0

## 2022-12-22 ENCOUNTER — OFFICE VISIT (OUTPATIENT)
Dept: FAMILY MEDICINE CLINIC | Age: 72
End: 2022-12-22

## 2022-12-22 VITALS
HEIGHT: 66 IN | BODY MASS INDEX: 24.11 KG/M2 | TEMPERATURE: 97.6 F | SYSTOLIC BLOOD PRESSURE: 123 MMHG | HEART RATE: 62 BPM | DIASTOLIC BLOOD PRESSURE: 75 MMHG | RESPIRATION RATE: 18 BRPM | WEIGHT: 150 LBS | OXYGEN SATURATION: 97 %

## 2022-12-22 DIAGNOSIS — H91.90 HEARING LOSS, UNSPECIFIED HEARING LOSS TYPE, UNSPECIFIED LATERALITY: Primary | ICD-10-CM

## 2022-12-22 NOTE — PROGRESS NOTES
Medicare Annual Wellness Visit    Hazel Mitchell is here for Medicare AWV    Assessment & Plan   Hearing loss, unspecified hearing loss type, unspecified laterality  -     JOVON Goel, Audiology, [place]    Recommendations for [employer] Due: see orders and patient instructions/AVS.  Recommended screening schedule for the next 5-10 years is provided to the patient in written form: see Patient Instructions/AVS.     No follow-ups on file. Subjective   No f/c. Neg covid 2 wks ago. Sinus pressure for 2 wks. Feeling better. [place] Dr. Tre Friedman [place]. FHx otosclerosis. Some hearing loss. Sings in [employer]. HCPOA:  ( who write's living schaefer)  Code status: full code  QOL: that you do what is right regardless of how you feel. I do [place] be healthy, I do wanna be active. We bike with our friends. But if that's ever taken away, life is still meaningful. We go to [employer]. I go to a paint class. In the end, what really matters are eternal matters. Are we right with the lord. Everything can be taken away from you, and we can still have a meaningful life. 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 [place] enough to pay for supplies.  builds frames. Patient's complete Health Risk Assessment and screening values have been reviewed and are found in Flowsheets. The following problems were reviewed today and where indicated follow up appointments were made and/or referrals ordered. Positive Risk Factor Screenings with Interventions:       Cognitive:    Words recalled: 1 Word Recalled           Total Score Interpretation: Abnormal Mini-Cog      Interventions:  Patient declines any further evaluation or treatment               Hearing Screen:  Do you or your family notice any trouble with your hearing that hasn't been managed with hearing aids?: (!) Yes    Interventions:  Referred to Audiology                       Objective Vitals:    12/22/22 0843   BP: 123/75   Pulse: 62   Resp: 18   Temp: 97.6 °F (36.4 °C)   SpO2: 97%   Weight: 150 lb (68 kg)   Height: 5' 6\" (1.676 m)      Body mass index is 24.21 kg/m². Allergies   Allergen Reactions    Seasonal      Post-nasal drip   Headaches  Ear aches     Prior to Visit Medications    Medication Sig Taking? Authorizing Provider   lovastatin (MEVACOR) 40 MG tablet TAKE ONE TABLET BY MOUTH EVERY DAY Yes GIANNA Denney CNP   levothyroxine (SYNTHROID) 50 MCG tablet TAKE 1 TABLET BY MOUTH ON MONDAY, TUESDAY, AND WEDNESDAY. Yes GIANNA Denney CNP   levothyroxine (SYNTHROID) 75 MCG tablet Take 1 tablet by mouth See Admin Instructions Thursday-Sunday Yes GIANNA Denney CNP   metFORMIN (GLUCOPHAGE) 1000 MG tablet Take 1 tablet by mouth 2 times daily (with meals) Yes GIANNA Denney CNP   Bioflavonoid Products (LYN-C) 500-550 MG TABS  Yes Historical Provider, MD   estradiol (ESTRACE VAGINAL) 0.1 MG/GM vaginal cream Place 1 g vaginally daily Yes Arlyn Owens MD   BIOTIN PO Take 1 capsule by mouth daily Yes Historical Provider, MD   FREESTYLE LITE strip TEST DAILY AS NEEDED Yes GIANNA Higginbotham CNP   FreeStyle Lancets MISC USE ONCE A DAY.  Yes India Groves MD   Multiple Vitamins-Minerals (PRESERVISION AREDS 2+MULTI VIT PO) Take by mouth Yes Historical Provider, MD   Omega-3 Fatty Acids ([employer] NATURAL FISH OIL PO) Take by mouth Yes Historical Provider, MD   Blood Glucose Monitoring Suppl (FREESTYLE FREEDOM LITE) w/Device KIT 1 kit by Does not apply route daily Yes India Groves MD   Polyethylene Glycol 3350 (MIRALAX PO) Take by mouth Indications: PRN Pt takes a teaspoon daily Yes Historical Provider, MD   Psyllium (METAMUCIL FIBER PO) Take by mouth Yes Historical Provider, MD   B Complex Vitamins (VITAMIN B COMPLEX PO) Take by mouth 2 times daily  Yes Historical Provider, MD   Cholecalciferol (VITAMIN D3) 1000 UNITS TABS Take by mouth daily Yes Historical Provider, MD       CareTeam (Including outside providers/suppliers regularly involved in providing care):   Patient Care Team:  Kaylah Tong MD as PCP - General (Family Medicine)  Kaylah Tong MD as PCP - [facility] Empaneled Provider     Reviewed and updated this visit:  Tobacco  Allergies  Meds  Med Hx  Surg Hx  Soc Hx  Fam Hx

## 2023-02-07 ENCOUNTER — TELEPHONE (OUTPATIENT)
Dept: FAMILY MEDICINE CLINIC | Age: 73
End: 2023-02-07

## 2023-02-07 NOTE — TELEPHONE ENCOUNTER
----- Message from Ayad Mckeon sent at 2/7/2023  8:18 AM EST -----  Subject: Message to Provider    QUESTIONS  Information for Provider? Patient has tested positive for Covid-19   2/5/2023. She is taking Sudafed for sinus and Advil for body aches. She   wants to know if there is something else she needs to be taking. She would   also like to know when can she go around people. How long does she have   until she can go around people again. Please call her and answer questions   one the phone or [employer].  ---------------------------------------------------------------------------  --------------  [id number] [employer]Like  [phone]; OK to leave message on voicemail  ---------------------------------------------------------------------------  --------------  SCRIPT ANSWERS  Relationship to Patient?  Self

## 2023-02-20 ENCOUNTER — OFFICE VISIT (OUTPATIENT)
Dept: PRIMARY CARE CLINIC | Age: 73
End: 2023-02-20
Payer: MEDICARE

## 2023-02-20 VITALS
DIASTOLIC BLOOD PRESSURE: 81 MMHG | HEART RATE: 73 BPM | WEIGHT: 148.8 LBS | OXYGEN SATURATION: 95 % | SYSTOLIC BLOOD PRESSURE: 139 MMHG | TEMPERATURE: 97 F | HEIGHT: 66 IN | BODY MASS INDEX: 23.91 KG/M2 | RESPIRATION RATE: 20 BRPM

## 2023-02-20 DIAGNOSIS — M54.2 NECK PAIN, MUSCULOSKELETAL: Primary | ICD-10-CM

## 2023-02-20 PROCEDURE — 1123F ACP DISCUSS/DSCN MKR DOCD: CPT | Performed by: NURSE PRACTITIONER

## 2023-02-20 PROCEDURE — 99213 OFFICE O/P EST LOW 20 MIN: CPT | Performed by: NURSE PRACTITIONER

## 2023-02-20 RX ORDER — PREDNISONE 10 MG/1
10 TABLET ORAL 2 TIMES DAILY
Qty: 10 TABLET | Refills: 0 | Status: SHIPPED | OUTPATIENT
Start: 2023-02-20 | End: 2023-02-25

## 2023-02-20 NOTE — PROGRESS NOTES
Chief Complaint:   Neck Pain      History of Present Illness   Source of history provided by:  patient. Daisy Schreiber is a 67 y.o. old female who has a past medical history of:   Past Medical History:   Diagnosis Date    Actinic keratosis     Diabetes ([address]     Early dry stage nonexudative age-related macular degeneration     Hyperlipidemia     Hypothyroidism     Lichen sclerosus     Prolonged emergence from general anesthesia     Rotator cuff impingement syndrome     Spinal arthritis     Spinal stenosis     Vertigo        Pt  presents to the ready care for neck pain for the past several days. Pain localized to muscles surrounding neck. The pain was caused by no known event. .   Pt states the pain is worse with movement   There is no radiation of the pain into the shoulders or BUE . Pt denies any  N/V/D, fever, chills, HA,  recent illness, dysuria, or lethargy. ROS    Unless otherwise stated in this report or unable to obtain because of the patient's clinical or mental status as evidenced by the medical record, this patients's positive and negative responses for Review of Systems, constitutional, psych, eyes, ENT, cardiovascular, respiratory, gastrointestinal, neurological, genitourinary, musculoskeletal, integument systems and systems related to the presenting problem are either stated in the preceding or were not pertinent or were negative for the symptoms and/or complaints related to the medical problem.     Past Medical History:   Past Surgical History:   Procedure Laterality Date    BREAST BIOPSY      Stereotactic    COLONOSCOPY      COLONOSCOPY  10/01/2019    normal--christopher    COLONOSCOPY N/A 10/01/2019    COLONOSCOPY DIAGNOSTIC performed by Amarjit Ware MD at [address] [facility] N/A 10/24/2022    COLONOSCOPY POLYPECTOMY SNARE/COLD BIOPSY performed by Claudeen Bowman, MD at [facility] (CERVIX STATUS UNKNOWN)      cvx gone    MOHS SURGERY Right     right leg SCC    RECTOCELE REPAIR      THYROIDECTOMY, PARTIAL  01/2008    TUBAL LIGATION       Social History:  reports that she has never smoked. She has never used smokeless tobacco. She reports current alcohol use of about 7.0 standard drinks per week. She reports that she does not use drugs.  Family History: family history includes Cancer in her father, mother, paternal grandmother, and sister; Diabetes in her father; Heart Attack in her maternal grandfather; Heart Disease in her maternal grandfather; Heart Failure in her father and paternal grandfather; Kidney Disease in her maternal grandmother; Other in an other family member; Stroke in her father.  Allergies: Seasonal    Physical Exam         VS:  /81   Pulse 73   Temp 97 °F (36.1 °C) (Temporal)   Resp 20   Ht 5' 6\" (1.676 m)   Wt 148 lb 12.8 oz (67.5 kg)   SpO2 95%   BMI 24.02 kg/m²    Oxygen Saturation Interpretation: Normal.    Constitutional:  Alert, development consistent with age.  HEENT:  NC/NT.  Airway patent.  Eyes PERRL.  External ears normal.  Pharynx without erythema or exudate.   Neck:  Normal ROM.  Supple.  No TTP.    Lungs:  Clear to auscultation and breath sounds equal.  Heart:  Regular rate and rhythm, normal heart sounds, without pathological murmurs, ectopy, gallops, or rubs.  Abdomen:  Soft, nontender, good bowel sounds.  No firm or pulsatile mass.  Neck: Tenderness: TTP over bilateral cervical paraspinal muscles with no appreciable midline tenderness.              Swelling: No edema.               Range of Motion: Decreased lROM due to pain.            Skin:  No bruising, redness, abrasions, or rashes.  Skin:  Normal turgor.  Warm, dry, without visible rash.  Neurological:  Alert and oriented.  Motor functions intact.    Lab / Imaging Results   (All laboratory and radiology results have been personally reviewed by myself)  Labs:      Imaging:  All Radiology results interpreted by Radiologist unless  otherwise noted. Assessment / Plan     Impression(s):  1. Neck pain, musculoskeletal        Disposition:  Disposition: Start Prednisone as ordered, can continue muscle relaxer. Advised to alternate ice/heat. If no improvement , return to walk in care for cervical spine xray.

## 2023-02-21 ENCOUNTER — PROCEDURE VISIT (OUTPATIENT)
Dept: AUDIOLOGY | Age: 73
End: 2023-02-21
Payer: MEDICARE

## 2023-02-21 DIAGNOSIS — H93.13 TINNITUS OF BOTH EARS: ICD-10-CM

## 2023-02-21 DIAGNOSIS — H90.3 SENSORINEURAL HEARING LOSS (SNHL) OF BOTH EARS: Primary | ICD-10-CM

## 2023-02-21 PROCEDURE — 92557 COMPREHENSIVE HEARING TEST: CPT | Performed by: AUDIOLOGIST

## 2023-02-21 PROCEDURE — 92567 TYMPANOMETRY: CPT | Performed by: AUDIOLOGIST

## 2023-03-28 ENCOUNTER — TELEPHONE (OUTPATIENT)
Dept: GASTROENTEROLOGY | Age: 73
End: 2023-03-28

## 2023-03-28 NOTE — TELEPHONE ENCOUNTER
Left message for patient notifying her that the appointment scheduled for 5/3 would be cancelled with Dr Chris Rowell and to call our office to reschedule for a different day

## 2023-06-05 ENCOUNTER — OFFICE VISIT (OUTPATIENT)
Dept: GASTROENTEROLOGY | Age: 73
End: 2023-06-05

## 2023-06-05 VITALS
HEIGHT: 66 IN | SYSTOLIC BLOOD PRESSURE: 118 MMHG | BODY MASS INDEX: 23.95 KG/M2 | DIASTOLIC BLOOD PRESSURE: 72 MMHG | WEIGHT: 149 LBS | RESPIRATION RATE: 18 BRPM

## 2023-06-05 DIAGNOSIS — K58.2 IRRITABLE BOWEL SYNDROME WITH BOTH CONSTIPATION AND DIARRHEA: Primary | ICD-10-CM

## 2023-06-05 DIAGNOSIS — Z86.010 HISTORY OF COLON POLYPS: ICD-10-CM

## 2023-06-05 RX ORDER — SIMETHICONE 80 MG
80 TABLET,CHEWABLE ORAL 4 TIMES DAILY PRN
Qty: 90 TABLET | Refills: 11 | Status: SHIPPED | OUTPATIENT
Start: 2023-06-05

## 2023-06-05 RX ORDER — NORTRIPTYLINE HYDROCHLORIDE 25 MG/1
25 CAPSULE ORAL NIGHTLY
Qty: 30 CAPSULE | Refills: 11 | Status: SHIPPED | OUTPATIENT
Start: 2023-06-05

## 2023-06-09 ENCOUNTER — HOSPITAL ENCOUNTER (OUTPATIENT)
Age: 73
Discharge: HOME | End: 2023-06-09
Payer: MEDICARE

## 2023-06-09 LAB
ALBUMIN SERPL-MCNC: 4.2 G/DL (ref 3.5–5.2)
ALP SERPL-CCNC: 66 U/L (ref 35–104)
ALT SERPL-CCNC: 13 U/L (ref 0–32)
ANION GAP SERPL CALCULATED.3IONS-SCNC: 9 MMOL/L (ref 7–16)
AST SERPL-CCNC: 16 U/L (ref 0–31)
BILIRUB SERPL-MCNC: 0.3 MG/DL (ref 0–1.2)
BUN SERPL-MCNC: 19 MG/DL (ref 6–23)
CALCIUM SERPL-MCNC: 9.5 MG/DL (ref 8.6–10.2)
CHLORIDE SERPL-SCNC: 103 MMOL/L (ref 98–107)
CHOLESTEROL, TOTAL: 192 MG/DL (ref 0–199)
CO2 SERPL-SCNC: 28 MMOL/L (ref 22–29)
CREAT SERPL-MCNC: 1 MG/DL (ref 0.5–1)
CREAT UR-MCNC: 214 MG/DL (ref 29–226)
GLUCOSE SERPL-MCNC: 103 MG/DL (ref 74–99)
HBA1C MFR BLD: 6.1 % (ref 4–5.6)
HDLC SERPL-MCNC: 77 MG/DL
LDLC SERPL CALC-MCNC: 102 MG/DL (ref 0–99)
MICROALBUMIN UR-MCNC: 14.5 MG/L
MICROALBUMIN/CREAT UR-RTO: 6.8 (ref 0–30)
POTASSIUM SERPL-SCNC: 4.3 MMOL/L (ref 3.5–5)
PROT SERPL-MCNC: 6.6 G/DL (ref 6.4–8.3)
SODIUM SERPL-SCNC: 140 MMOL/L (ref 132–146)
T4 FREE SERPL-MCNC: 1.55 NG/DL (ref 0.93–1.7)
TRIGL SERPL-MCNC: 67 MG/DL (ref 0–149)
TSH SERPL-MCNC: 4.11 UIU/ML (ref 0.27–4.2)
VLDLC SERPL CALC-MCNC: 13 MG/DL

## 2023-06-09 PROCEDURE — 84439 ASSAY OF FREE THYROXINE: CPT

## 2023-06-09 PROCEDURE — 84443 ASSAY THYROID STIM HORMONE: CPT

## 2023-06-09 PROCEDURE — 82044 UR ALBUMIN SEMIQUANTITATIVE: CPT

## 2023-06-09 PROCEDURE — 80061 LIPID PANEL: CPT

## 2023-06-09 PROCEDURE — 36415 COLL VENOUS BLD VENIPUNCTURE: CPT

## 2023-06-09 PROCEDURE — 82570 ASSAY OF URINE CREATININE: CPT

## 2023-06-09 PROCEDURE — 80053 COMPREHEN METABOLIC PANEL: CPT

## 2023-06-09 PROCEDURE — 83036 HEMOGLOBIN GLYCOSYLATED A1C: CPT

## 2023-06-22 LAB — MAMMOGRAPHY, EXTERNAL: NORMAL

## 2023-09-11 SDOH — ECONOMIC STABILITY: FOOD INSECURITY: WITHIN THE PAST 12 MONTHS, YOU WORRIED THAT YOUR FOOD WOULD RUN OUT BEFORE YOU GOT MONEY TO BUY MORE.: NEVER TRUE

## 2023-09-11 SDOH — ECONOMIC STABILITY: INCOME INSECURITY: HOW HARD IS IT FOR YOU TO PAY FOR THE VERY BASICS LIKE FOOD, HOUSING, MEDICAL CARE, AND HEATING?: NOT HARD AT ALL

## 2023-09-11 SDOH — ECONOMIC STABILITY: FOOD INSECURITY: WITHIN THE PAST 12 MONTHS, THE FOOD YOU BOUGHT JUST DIDN'T LAST AND YOU DIDN'T HAVE MONEY TO GET MORE.: NEVER TRUE

## 2023-09-11 SDOH — ECONOMIC STABILITY: HOUSING INSECURITY
IN THE LAST 12 MONTHS, WAS THERE A TIME WHEN YOU DID NOT HAVE A STEADY PLACE TO SLEEP OR SLEPT IN A SHELTER (INCLUDING NOW)?: NO

## 2023-09-11 SDOH — ECONOMIC STABILITY: TRANSPORTATION INSECURITY
IN THE PAST 12 MONTHS, HAS LACK OF TRANSPORTATION KEPT YOU FROM MEETINGS, WORK, OR FROM GETTING THINGS NEEDED FOR DAILY LIVING?: NO

## 2023-09-11 ASSESSMENT — PATIENT HEALTH QUESTIONNAIRE - PHQ9
SUM OF ALL RESPONSES TO PHQ QUESTIONS 1-9: 0
SUM OF ALL RESPONSES TO PHQ9 QUESTIONS 1 & 2: 0
SUM OF ALL RESPONSES TO PHQ QUESTIONS 1-9: 0
1. LITTLE INTEREST OR PLEASURE IN DOING THINGS: 0
2. FEELING DOWN, DEPRESSED OR HOPELESS: 0
SUM OF ALL RESPONSES TO PHQ QUESTIONS 1-9: 0
SUM OF ALL RESPONSES TO PHQ QUESTIONS 1-9: 0
SUM OF ALL RESPONSES TO PHQ9 QUESTIONS 1 & 2: 0
1. LITTLE INTEREST OR PLEASURE IN DOING THINGS: NOT AT ALL
2. FEELING DOWN, DEPRESSED OR HOPELESS: NOT AT ALL

## 2023-09-14 ENCOUNTER — OFFICE VISIT (OUTPATIENT)
Dept: FAMILY MEDICINE CLINIC | Age: 73
End: 2023-09-14
Payer: MEDICARE

## 2023-09-14 VITALS
RESPIRATION RATE: 20 BRPM | TEMPERATURE: 97.4 F | BODY MASS INDEX: 23.46 KG/M2 | OXYGEN SATURATION: 100 % | SYSTOLIC BLOOD PRESSURE: 144 MMHG | WEIGHT: 146 LBS | HEART RATE: 63 BPM | HEIGHT: 66 IN | DIASTOLIC BLOOD PRESSURE: 83 MMHG

## 2023-09-14 DIAGNOSIS — K58.9 IRRITABLE BOWEL SYNDROME, UNSPECIFIED TYPE: ICD-10-CM

## 2023-09-14 DIAGNOSIS — E11.9 TYPE 2 DIABETES MELLITUS WITHOUT COMPLICATION, WITHOUT LONG-TERM CURRENT USE OF INSULIN (HCC): ICD-10-CM

## 2023-09-14 DIAGNOSIS — E03.9 PRIMARY HYPOTHYROIDISM: ICD-10-CM

## 2023-09-14 DIAGNOSIS — H91.90 HEARING LOSS, UNSPECIFIED HEARING LOSS TYPE, UNSPECIFIED LATERALITY: Primary | ICD-10-CM

## 2023-09-14 DIAGNOSIS — N18.31 STAGE 3A CHRONIC KIDNEY DISEASE (HCC): ICD-10-CM

## 2023-09-14 LAB — HBA1C MFR BLD: 6.2 %

## 2023-09-14 PROCEDURE — 99214 OFFICE O/P EST MOD 30 MIN: CPT | Performed by: FAMILY MEDICINE

## 2023-09-14 PROCEDURE — 3044F HG A1C LEVEL LT 7.0%: CPT | Performed by: FAMILY MEDICINE

## 2023-09-14 PROCEDURE — 1123F ACP DISCUSS/DSCN MKR DOCD: CPT | Performed by: FAMILY MEDICINE

## 2023-09-14 PROCEDURE — 83036 HEMOGLOBIN GLYCOSYLATED A1C: CPT | Performed by: FAMILY MEDICINE

## 2023-09-14 SDOH — ECONOMIC STABILITY: HOUSING INSECURITY: IN THE LAST 12 MONTHS, HOW MANY PLACES HAVE YOU LIVED?: 1

## 2023-09-14 SDOH — ECONOMIC STABILITY: INCOME INSECURITY: IN THE LAST 12 MONTHS, WAS THERE A TIME WHEN YOU WERE NOT ABLE TO PAY THE MORTGAGE OR RENT ON TIME?: NO

## 2023-09-14 SDOH — ECONOMIC STABILITY: TRANSPORTATION INSECURITY
IN THE PAST 12 MONTHS, HAS THE LACK OF TRANSPORTATION KEPT YOU FROM MEDICAL APPOINTMENTS OR FROM GETTING MEDICATIONS?: NO

## 2023-09-14 ASSESSMENT — SOCIAL DETERMINANTS OF HEALTH (SDOH)
IN A TYPICAL WEEK, HOW MANY TIMES DO YOU TALK ON THE PHONE WITH FAMILY, FRIENDS, OR NEIGHBORS?: MORE THAN THREE TIMES A WEEK
HOW OFTEN DO YOU ATTEND CHURCH OR RELIGIOUS SERVICES?: NEVER
DO YOU BELONG TO ANY CLUBS OR ORGANIZATIONS SUCH AS CHURCH GROUPS UNIONS, FRATERNAL OR ATHLETIC GROUPS, OR SCHOOL GROUPS?: NO
HOW OFTEN DO YOU GET TOGETHER WITH FRIENDS OR RELATIVES?: MORE THAN THREE TIMES A WEEK
HOW OFTEN DO YOU ATTENT MEETINGS OF THE CLUB OR ORGANIZATION YOU BELONG TO?: NEVER

## 2023-09-14 NOTE — PATIENT INSTRUCTIONS
Please call every pharmacy around and ask if they provide the shingles vaccine and how much it costs. If it's affordable please get it and let me know when you've received it. Check your blood pressure twice daily for at least 3 days, and call me in 2 weeks with your new blood pressure numbers.

## 2023-10-04 ENCOUNTER — OFFICE VISIT (OUTPATIENT)
Dept: GASTROENTEROLOGY | Age: 73
End: 2023-10-04
Payer: MEDICARE

## 2023-10-04 VITALS
SYSTOLIC BLOOD PRESSURE: 130 MMHG | BODY MASS INDEX: 23.4 KG/M2 | DIASTOLIC BLOOD PRESSURE: 64 MMHG | OXYGEN SATURATION: 91 % | WEIGHT: 145 LBS | TEMPERATURE: 97.9 F | HEART RATE: 67 BPM

## 2023-10-04 DIAGNOSIS — K58.2 IRRITABLE BOWEL SYNDROME WITH BOTH CONSTIPATION AND DIARRHEA: Primary | ICD-10-CM

## 2023-10-04 DIAGNOSIS — Z86.010 HISTORY OF COLON POLYPS: ICD-10-CM

## 2023-10-04 PROCEDURE — 1123F ACP DISCUSS/DSCN MKR DOCD: CPT | Performed by: STUDENT IN AN ORGANIZED HEALTH CARE EDUCATION/TRAINING PROGRAM

## 2023-10-04 PROCEDURE — 99213 OFFICE O/P EST LOW 20 MIN: CPT | Performed by: STUDENT IN AN ORGANIZED HEALTH CARE EDUCATION/TRAINING PROGRAM

## 2024-02-27 SDOH — HEALTH STABILITY: PHYSICAL HEALTH: ON AVERAGE, HOW MANY MINUTES DO YOU ENGAGE IN EXERCISE AT THIS LEVEL?: 60 MIN

## 2024-02-27 SDOH — HEALTH STABILITY: PHYSICAL HEALTH: ON AVERAGE, HOW MANY DAYS PER WEEK DO YOU ENGAGE IN MODERATE TO STRENUOUS EXERCISE (LIKE A BRISK WALK)?: 5 DAYS

## 2024-02-27 ASSESSMENT — LIFESTYLE VARIABLES
HOW OFTEN DURING THE LAST YEAR HAVE YOU FOUND THAT YOU WERE NOT ABLE TO STOP DRINKING ONCE YOU HAD STARTED: NEVER
HAS A RELATIVE, FRIEND, DOCTOR, OR ANOTHER HEALTH PROFESSIONAL EXPRESSED CONCERN ABOUT YOUR DRINKING OR SUGGESTED YOU CUT DOWN: NO
HOW OFTEN DO YOU HAVE A DRINK CONTAINING ALCOHOL: 5
HOW OFTEN DURING THE LAST YEAR HAVE YOU FAILED TO DO WHAT WAS NORMALLY EXPECTED FROM YOU BECAUSE OF DRINKING: 0
HOW OFTEN DO YOU HAVE SIX OR MORE DRINKS ON ONE OCCASION: 1
HOW OFTEN DO YOU HAVE A DRINK CONTAINING ALCOHOL: 4 OR MORE TIMES A WEEK
HOW OFTEN DURING THE LAST YEAR HAVE YOU FAILED TO DO WHAT WAS NORMALLY EXPECTED FROM YOU BECAUSE OF DRINKING: NEVER
HOW OFTEN DURING THE LAST YEAR HAVE YOU BEEN UNABLE TO REMEMBER WHAT HAPPENED THE NIGHT BEFORE BECAUSE YOU HAD BEEN DRINKING: NEVER
HAVE YOU OR SOMEONE ELSE BEEN INJURED AS A RESULT OF YOUR DRINKING: 0
HAS A RELATIVE, FRIEND, DOCTOR, OR ANOTHER HEALTH PROFESSIONAL EXPRESSED CONCERN ABOUT YOUR DRINKING OR SUGGESTED YOU CUT DOWN: 0
HOW OFTEN DURING THE LAST YEAR HAVE YOU NEEDED AN ALCOHOLIC DRINK FIRST THING IN THE MORNING TO GET YOURSELF GOING AFTER A NIGHT OF HEAVY DRINKING: 0
HOW OFTEN DURING THE LAST YEAR HAVE YOU FOUND THAT YOU WERE NOT ABLE TO STOP DRINKING ONCE YOU HAD STARTED: 0
HOW MANY STANDARD DRINKS CONTAINING ALCOHOL DO YOU HAVE ON A TYPICAL DAY: 1
HAVE YOU OR SOMEONE ELSE BEEN INJURED AS A RESULT OF YOUR DRINKING: NO
HOW OFTEN DURING THE LAST YEAR HAVE YOU BEEN UNABLE TO REMEMBER WHAT HAPPENED THE NIGHT BEFORE BECAUSE YOU HAD BEEN DRINKING: 0
HOW MANY STANDARD DRINKS CONTAINING ALCOHOL DO YOU HAVE ON A TYPICAL DAY: 1 OR 2
HOW OFTEN DURING THE LAST YEAR HAVE YOU HAD A FEELING OF GUILT OR REMORSE AFTER DRINKING: 0
HOW OFTEN DURING THE LAST YEAR HAVE YOU HAD A FEELING OF GUILT OR REMORSE AFTER DRINKING: NEVER
HOW OFTEN DURING THE LAST YEAR HAVE YOU NEEDED AN ALCOHOLIC DRINK FIRST THING IN THE MORNING TO GET YOURSELF GOING AFTER A NIGHT OF HEAVY DRINKING: NEVER

## 2024-02-27 ASSESSMENT — PATIENT HEALTH QUESTIONNAIRE - PHQ9
2. FEELING DOWN, DEPRESSED OR HOPELESS: 0
SUM OF ALL RESPONSES TO PHQ QUESTIONS 1-9: 0
1. LITTLE INTEREST OR PLEASURE IN DOING THINGS: 0
SUM OF ALL RESPONSES TO PHQ9 QUESTIONS 1 & 2: 0

## 2024-03-01 ENCOUNTER — OFFICE VISIT (OUTPATIENT)
Dept: FAMILY MEDICINE CLINIC | Age: 74
End: 2024-03-01
Payer: MEDICARE

## 2024-03-01 VITALS
WEIGHT: 146 LBS | BODY MASS INDEX: 23.46 KG/M2 | SYSTOLIC BLOOD PRESSURE: 134 MMHG | RESPIRATION RATE: 19 BRPM | OXYGEN SATURATION: 98 % | DIASTOLIC BLOOD PRESSURE: 72 MMHG | TEMPERATURE: 97 F | HEART RATE: 60 BPM | HEIGHT: 66 IN

## 2024-03-01 DIAGNOSIS — Z00.00 MEDICARE ANNUAL WELLNESS VISIT, SUBSEQUENT: ICD-10-CM

## 2024-03-01 DIAGNOSIS — E11.9 TYPE 2 DIABETES MELLITUS WITHOUT COMPLICATION, WITHOUT LONG-TERM CURRENT USE OF INSULIN (HCC): Primary | ICD-10-CM

## 2024-03-01 LAB — HBA1C MFR BLD: 6.2 %

## 2024-03-01 PROCEDURE — 3044F HG A1C LEVEL LT 7.0%: CPT | Performed by: FAMILY MEDICINE

## 2024-03-01 PROCEDURE — G0439 PPPS, SUBSEQ VISIT: HCPCS | Performed by: FAMILY MEDICINE

## 2024-03-01 PROCEDURE — 83036 HEMOGLOBIN GLYCOSYLATED A1C: CPT | Performed by: FAMILY MEDICINE

## 2024-03-01 PROCEDURE — 1123F ACP DISCUSS/DSCN MKR DOCD: CPT | Performed by: FAMILY MEDICINE

## 2024-03-01 NOTE — PROGRESS NOTES
Medicare Annual Wellness Visit    Kiersten Mitchell is here for Medicare AWV    Assessment & Plan   Type 2 diabetes mellitus without complication, without long-term current use of insulin (HCC)  -     POCT glycosylated hemoglobin (Hb A1C)    Recommendations for Preventive Services Due: see orders and patient instructions/AVS.  Recommended screening schedule for the next 5-10 years is provided to the patient in written form: see Patient Instructions/AVS.     No follow-ups on file.     Subjective     HCPOA: is   Code status: full  No QOL: that's a hard one, because you don't really know till you get there.  If I were comatose, and there were no chance of waking up  Values: committed [place]. My most important value is to know God and enjoy him, and to be obedient and useful. I say happiness is a byproduct of living a good life. If you live your life trying to be happy, you'll end up chasing things that don't help        Son got . Now pt has step-granddaughters  Mom was nurse.    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 Used to be .   Recommended [employer].     Patient's complete Health Risk Assessment and screening values have been reviewed and are found in Flowsheets. The following problems were reviewed today and where indicated follow up appointments were made and/or referrals ordered.    Positive Risk Factor Screenings with Interventions:                    Safety:  Do you have non-slip mats or non-slip surfaces or shower bars or grab bars in your shower or bathtub?: (!) No    Interventions:  See AVS for additional education material                   Objective   Vitals:    03/01/24 0921   BP: 134/72   Pulse: 60   Resp: 19   Temp: 97 °F (36.1 °C)   SpO2: 98%   Weight: 66.2 kg (146 lb)   Height: 1.676 m (5' 6\")      Body mass index is 23.57 kg/m².             Allergies   Allergen Reactions    Seasonal      Post-nasal drip   Headaches  Ear aches

## 2024-03-01 NOTE — PATIENT INSTRUCTIONS
the FREE \"Exercise & Physical Activity: Your Everyday Guide\" from [employer]. Call 1-706.170.9315 or search [employer] online.  You need 8363-5202 mg of calcium and 3225-3611 IU of vitamin D per day. It is possible to meet your calcium requirement with diet alone, but a vitamin D supplement is usually necessary to meet this goal.  When exposed to the sun, use a sunscreen that protects against both UVA and UVB radiation with an SPF of 30 or greater. Reapply every 2 to 3 hours or after sweating, drying off with a towel, or swimming.  Always wear a seat belt when traveling in a car. Always wear a helmet when riding a bicycle or motorcycle.

## 2024-05-09 ENCOUNTER — HOSPITAL ENCOUNTER (OUTPATIENT)
Age: 74
Discharge: HOME | End: 2024-05-09
Payer: MEDICARE

## 2024-05-09 LAB
ALBUMIN SERPL-MCNC: 4.2 G/DL (ref 3.5–5.2)
ALP SERPL-CCNC: 77 U/L (ref 35–104)
ALT SERPL-CCNC: 15 U/L (ref 0–32)
ANION GAP SERPL CALCULATED.3IONS-SCNC: 9 MMOL/L (ref 7–16)
AST SERPL-CCNC: 17 U/L (ref 0–31)
BILIRUB SERPL-MCNC: 0.2 MG/DL (ref 0–1.2)
BUN SERPL-MCNC: 15 MG/DL (ref 6–23)
CALCIUM SERPL-MCNC: 9.5 MG/DL (ref 8.6–10.2)
CHLORIDE SERPL-SCNC: 105 MMOL/L (ref 98–107)
CHOLEST SERPL-MCNC: 184 MG/DL
CO2 SERPL-SCNC: 29 MMOL/L (ref 22–29)
CREAT SERPL-MCNC: 1 MG/DL (ref 0.5–1)
CREAT UR-MCNC: 85.6 MG/DL (ref 29–226)
GFR, ESTIMATED: 59 ML/MIN/1.73M2
GLUCOSE SERPL-MCNC: 107 MG/DL (ref 74–99)
HDLC SERPL-MCNC: 73 MG/DL
LDLC SERPL CALC-MCNC: 100 MG/DL
MICROALBUMIN UR-MCNC: <12 MG/L (ref 0–19)
MICROALBUMIN/CREAT UR-RTO: NORMAL MCG/MG CREAT (ref 0–30)
POTASSIUM SERPL-SCNC: 4.5 MMOL/L (ref 3.5–5)
PROT SERPL-MCNC: 6.5 G/DL (ref 6.4–8.3)
SODIUM SERPL-SCNC: 143 MMOL/L (ref 132–146)
T4 FREE SERPL-MCNC: 1.3 NG/DL (ref 0.9–1.7)
TRIGL SERPL-MCNC: 56 MG/DL
TSH SERPL DL<=0.05 MIU/L-ACNC: 3.83 UIU/ML (ref 0.27–4.2)
VLDLC SERPL CALC-MCNC: 11 MG/DL

## 2024-05-09 PROCEDURE — 80053 COMPREHEN METABOLIC PANEL: CPT

## 2024-05-09 PROCEDURE — 36415 COLL VENOUS BLD VENIPUNCTURE: CPT

## 2024-05-09 PROCEDURE — 80061 LIPID PANEL: CPT

## 2024-05-09 PROCEDURE — 84439 ASSAY OF FREE THYROXINE: CPT

## 2024-05-09 PROCEDURE — 82043 UR ALBUMIN QUANTITATIVE: CPT

## 2024-05-09 PROCEDURE — 82570 ASSAY OF URINE CREATININE: CPT

## 2024-05-09 PROCEDURE — 84443 ASSAY THYROID STIM HORMONE: CPT

## 2024-06-14 LAB — HBA1C MFR BLD: 6.2 %

## 2024-06-21 ENCOUNTER — HOSPITAL ENCOUNTER (EMERGENCY)
Age: 74
Discharge: HOME | End: 2024-06-21
Attending: EMERGENCY MEDICINE
Payer: MEDICARE

## 2024-06-21 ENCOUNTER — APPOINTMENT (OUTPATIENT)
Dept: CT IMAGING | Age: 74
End: 2024-06-21
Payer: MEDICARE

## 2024-06-21 ENCOUNTER — OFFICE VISIT (OUTPATIENT)
Dept: PRIMARY CARE CLINIC | Age: 74
End: 2024-06-21
Payer: MEDICARE

## 2024-06-21 VITALS
WEIGHT: 142 LBS | BODY MASS INDEX: 22.82 KG/M2 | OXYGEN SATURATION: 98 % | HEIGHT: 66 IN | TEMPERATURE: 98.9 F | SYSTOLIC BLOOD PRESSURE: 118 MMHG | RESPIRATION RATE: 16 BRPM | HEART RATE: 80 BPM | DIASTOLIC BLOOD PRESSURE: 72 MMHG

## 2024-06-21 VITALS
HEIGHT: 66 IN | BODY MASS INDEX: 22.82 KG/M2 | OXYGEN SATURATION: 99 % | SYSTOLIC BLOOD PRESSURE: 122 MMHG | WEIGHT: 142 LBS | HEART RATE: 74 BPM | RESPIRATION RATE: 16 BRPM | TEMPERATURE: 97.6 F | DIASTOLIC BLOOD PRESSURE: 68 MMHG

## 2024-06-21 DIAGNOSIS — J90 PLEURAL EFFUSION: Primary | ICD-10-CM

## 2024-06-21 DIAGNOSIS — R22.2 MASS OF THORACIC STRUCTURE: ICD-10-CM

## 2024-06-21 DIAGNOSIS — R07.89 CHEST WALL PAIN: ICD-10-CM

## 2024-06-21 DIAGNOSIS — R22.2 MASS OF CHEST WALL, RIGHT: Primary | ICD-10-CM

## 2024-06-21 LAB
ALBUMIN SERPL-MCNC: 4.3 G/DL (ref 3.5–5.2)
ALP SERPL-CCNC: 68 U/L (ref 35–104)
ALT SERPL-CCNC: 12 U/L (ref 0–32)
ANION GAP SERPL CALCULATED.3IONS-SCNC: 11 MMOL/L (ref 7–16)
AST SERPL-CCNC: 17 U/L (ref 0–31)
BASOPHILS # BLD: 0.07 K/UL (ref 0–0.2)
BASOPHILS NFR BLD: 1 % (ref 0–2)
BILIRUB SERPL-MCNC: 0.2 MG/DL (ref 0–1.2)
BUN SERPL-MCNC: 20 MG/DL (ref 6–23)
CALCIUM SERPL-MCNC: 9.7 MG/DL (ref 8.6–10.2)
CHLORIDE SERPL-SCNC: 102 MMOL/L (ref 98–107)
CO2 SERPL-SCNC: 27 MMOL/L (ref 22–29)
CREAT SERPL-MCNC: 1 MG/DL (ref 0.5–1)
EOSINOPHIL # BLD: 0.18 K/UL (ref 0.05–0.5)
EOSINOPHILS RELATIVE PERCENT: 2 % (ref 0–6)
ERYTHROCYTE [DISTWIDTH] IN BLOOD BY AUTOMATED COUNT: 12.9 % (ref 11.5–15)
GFR, ESTIMATED: 60 ML/MIN/1.73M2
GLUCOSE SERPL-MCNC: 94 MG/DL (ref 74–99)
HCT VFR BLD AUTO: 40.6 % (ref 34–48)
HGB BLD-MCNC: 13.1 G/DL (ref 11.5–15.5)
IMM GRANULOCYTES # BLD AUTO: <0.03 K/UL (ref 0–0.58)
IMM GRANULOCYTES NFR BLD: 0 % (ref 0–5)
LYMPHOCYTES NFR BLD: 2.04 K/UL (ref 1.5–4)
LYMPHOCYTES RELATIVE PERCENT: 25 % (ref 20–42)
MCH RBC QN AUTO: 29.3 PG (ref 26–35)
MCHC RBC AUTO-ENTMCNC: 32.3 G/DL (ref 32–34.5)
MCV RBC AUTO: 90.8 FL (ref 80–99.9)
MONOCYTES NFR BLD: 0.52 K/UL (ref 0.1–0.95)
MONOCYTES NFR BLD: 6 % (ref 2–12)
NEUTROPHILS NFR BLD: 65 % (ref 43–80)
NEUTS SEG NFR BLD: 5.33 K/UL (ref 1.8–7.3)
PLATELET # BLD AUTO: 332 K/UL (ref 130–450)
PMV BLD AUTO: 9.2 FL (ref 7–12)
POTASSIUM SERPL-SCNC: 4.7 MMOL/L (ref 3.5–5)
PROT SERPL-MCNC: 6.7 G/DL (ref 6.4–8.3)
RBC # BLD AUTO: 4.47 M/UL (ref 3.5–5.5)
SODIUM SERPL-SCNC: 140 MMOL/L (ref 132–146)
WBC OTHER # BLD: 8.2 K/UL (ref 4.5–11.5)

## 2024-06-21 PROCEDURE — 85025 COMPLETE CBC W/AUTO DIFF WBC: CPT

## 2024-06-21 PROCEDURE — 71260 CT THORAX DX C+: CPT

## 2024-06-21 PROCEDURE — 99285 EMERGENCY DEPT VISIT HI MDM: CPT

## 2024-06-21 PROCEDURE — 6360000004 HC RX CONTRAST MEDICATION: Performed by: RADIOLOGY

## 2024-06-21 PROCEDURE — 1123F ACP DISCUSS/DSCN MKR DOCD: CPT | Performed by: EMERGENCY MEDICINE

## 2024-06-21 PROCEDURE — 80053 COMPREHEN METABOLIC PANEL: CPT

## 2024-06-21 PROCEDURE — 99213 OFFICE O/P EST LOW 20 MIN: CPT | Performed by: EMERGENCY MEDICINE

## 2024-06-21 PROCEDURE — 70491 CT SOFT TISSUE NECK W/DYE: CPT

## 2024-06-21 RX ADMIN — IOPAMIDOL 75 ML: 755 INJECTION, SOLUTION INTRAVENOUS at 15:53

## 2024-06-21 ASSESSMENT — ENCOUNTER SYMPTOMS
SHORTNESS OF BREATH: 0
DIARRHEA: 0
SINUS PRESSURE: 0
BACK PAIN: 0
EYE DISCHARGE: 0
EYE REDNESS: 0
COUGH: 0
WHEEZING: 0
SORE THROAT: 0
VOMITING: 0
NAUSEA: 0
ABDOMINAL DISTENTION: 0
EYE PAIN: 0

## 2024-06-21 ASSESSMENT — PAIN DESCRIPTION - ONSET: ONSET: SUDDEN

## 2024-06-21 ASSESSMENT — LIFESTYLE VARIABLES
HOW MANY STANDARD DRINKS CONTAINING ALCOHOL DO YOU HAVE ON A TYPICAL DAY: PATIENT DOES NOT DRINK
HOW OFTEN DO YOU HAVE A DRINK CONTAINING ALCOHOL: NEVER

## 2024-06-21 ASSESSMENT — PAIN - FUNCTIONAL ASSESSMENT
PAIN_FUNCTIONAL_ASSESSMENT: 0-10
PAIN_FUNCTIONAL_ASSESSMENT: NONE - DENIES PAIN

## 2024-06-21 ASSESSMENT — PAIN DESCRIPTION - PAIN TYPE: TYPE: ACUTE PAIN

## 2024-06-21 ASSESSMENT — PAIN DESCRIPTION - FREQUENCY: FREQUENCY: CONTINUOUS

## 2024-06-21 ASSESSMENT — PAIN DESCRIPTION - DESCRIPTORS: DESCRIPTORS: DISCOMFORT;SORE

## 2024-06-21 ASSESSMENT — PAIN SCALES - GENERAL: PAINLEVEL_OUTOF10: 3

## 2024-06-21 NOTE — DISCHARGE INSTRUCTIONS
Follow-up with pulmonologist and general surgeon as soon as possible regarding possible pleural effusion and that lesion on your clavicle

## 2024-06-21 NOTE — ED NOTES
Department of Emergency Medicine  FIRST PROVIDER TRIAGE NOTE             Independent MLP           6/21/24  12:58 PM EDT    Date of Encounter: 6/21/24   MRN: [id number]      HPI: Kiersten Mitchell is a 73 y.o. female who presents to the ED for Mass (Lump to right collar bone appeared yesterday.  )       ROS: Negative for cp or sob.    PE: Gen Appearance/Constitutional: alert  HEENT: NC/NT. PERRLA,  Airway patent.  Neck: supple, right sided neck and clavicle swelling     Initial Plan of Care: All treatment areas with department are currently occupied. Plan to order/Initiate the following while awaiting opening in ED: labs and imaging studies.  Initiate Treatment-Testing, Proceed toTreatment Area When Bed Available for ED Attending/MLP to Continue Care    Electronically signed by GIANNA Arana CNP   DD: 6/21/24      Agustina Braxton APRN - CNP  06/21/24 9682

## 2024-06-21 NOTE — ED NOTES
-------------------------------------------------  Results for orders placed or performed during the hospital encounter of 06/21/24   CBC with Auto Differential   Result Value Ref Range    WBC 8.2 4.5 - 11.5 k/uL    RBC 4.47 3.50 - 5.50 m/uL    Hemoglobin 13.1 11.5 - 15.5 g/dL    Hematocrit 40.6 34.0 - 48.0 %    MCV 90.8 80.0 - 99.9 fL    MCH 29.3 26.0 - 35.0 pg    MCHC 32.3 32.0 - 34.5 g/dL    RDW 12.9 11.5 - 15.0 %    Platelets 332 130 - 450 k/uL    MPV 9.2 7.0 - 12.0 fL    Neutrophils % 65 43.0 - 80.0 %    Lymphocytes % 25 20.0 - 42.0 %    Monocytes % 6 2.0 - 12.0 %    Eosinophils % 2 0 - 6 %    Basophils % 1 0.0 - 2.0 %    Immature Granulocytes % 0 0.0 - 5.0 %    Neutrophils Absolute 5.33 1.80 - 7.30 k/uL    Lymphocytes Absolute 2.04 1.50 - 4.00 k/uL    Monocytes Absolute 0.52 0.10 - 0.95 k/uL    Eosinophils Absolute 0.18 0.05 - 0.50 k/uL    Basophils Absolute 0.07 0.00 - 0.20 k/uL    Immature Granulocytes Absolute <0.03 0.00 - 0.58 k/uL   CMP   Result Value Ref Range    Sodium 140 132 - 146 mmol/L    Potassium 4.7 3.5 - 5.0 mmol/L    Chloride 102 98 - 107 mmol/L    CO2 27 22 - 29 mmol/L    Anion Gap 11 7 - 16 mmol/L    Glucose 94 74 - 99 mg/dL    BUN 20 6 - 23 mg/dL    Creatinine 1.0 0.50 - 1.00 mg/dL    Est, Glom Filt Rate 60 (L) >60 mL/min/1.73m2    Calcium 9.7 8.6 - 10.2 mg/dL    Total Protein 6.7 6.4 - 8.3 g/dL    Albumin 4.3 3.5 - 5.2 g/dL    Total Bilirubin 0.2 0.0 - 1.2 mg/dL    Alkaline Phosphatase 68 35 - 104 U/L    ALT 12 0 - 32 U/L    AST 17 0 - 31 U/L     CT SOFT TISSUE NECK W CONTRAST   Final Result   1. No acute abnormality is identified.   2. Status post right thyroid lobectomy.         CT CHEST W CONTRAST   Final Result   1.  Underlying the area of interest there is mild sternoclavicular joint   asymmetry that suggests a right-sided effusion which is nonspecific.   Recommend correlation with clinical findings and if indicated MRI to further   evaluate.   2. No acute intrathoracic abnormality.

## 2024-06-21 NOTE — ED PROVIDER NOTES
equal, round, and reactive to light.   Neck:      Trachea: Trachea normal.   Cardiovascular:      Rate and Rhythm: Normal rate and regular rhythm.      Pulses: Normal pulses.      Heart sounds: No murmur heard.  Pulmonary:      Effort: Pulmonary effort is normal.      Breath sounds: No wheezing or rhonchi.   Chest:      Chest wall: Tenderness present.       Abdominal:      General: Bowel sounds are normal.      Tenderness: There is no abdominal tenderness. There is no right CVA tenderness, left CVA tenderness or guarding.   Musculoskeletal:         General: No swelling or deformity.      Cervical back: Normal range of motion and neck supple. No muscular tenderness.   Lymphadenopathy:      Cervical: No cervical adenopathy.      Upper Body:      Right upper body: No supraclavicular adenopathy.      Left upper body: No supraclavicular adenopathy.   Skin:     General: Skin is warm and dry.      Capillary Refill: Capillary refill takes less than 2 seconds.   Neurological:      General: No focal deficit present.      Mental Status: She is alert and oriented to person, place, and time.   Psychiatric:         Mood and Affect: Mood normal.           DIAGNOSTIC RESULTS   LABS:    Labs Reviewed   COMPREHENSIVE METABOLIC PANEL - Abnormal; Notable for the following components:       Result Value    Est, Glom Filt Rate 60 (*)     All other components within normal limits   CBC WITH AUTO DIFFERENTIAL       When ordered only abnormal lab results are displayed. All other labs were within normal range or not returned as of this dictation.        RADIOLOGY:   Non-plain film images such as CT, Ultrasound and MRI are read by the radiologist. Plain radiographic images are visualized and preliminarily interpreted by the ED Provider with the below findings:    Imaging showing joint effusion    Interpretation per the Radiologist below, if available at the time of this note:    CT SOFT TISSUE NECK W CONTRAST   Final Result   1. No acute

## 2024-06-21 NOTE — PROGRESS NOTES
Chief Complaint:   Mass (Under right collar bone-started yesterday )      History of Present Illness   HPI:  Kiersten Mitchell is a 73 y.o. female who [facility] for acute onset of swelling and tenderness at R costochondral area, developed overnight    Prior to Visit Medications    Medication Sig Taking? Authorizing Provider   Multiple Vitamin (MULTI-VITAMIN DAILY PO) Take 1 capsule by mouth in the morning and at bedtime Viviscal Pro rec by Derm for hair loss Yes Freddy Laureano MD   simethicone (MYLICON) 80 MG chewable tablet Take 1 tablet by mouth 4 times daily as needed for Flatulence Yes Kole Granados MD   lovastatin (MEVACOR) 40 MG tablet TAKE ONE TABLET BY MOUTH EVERY DAY Yes Abbi Castrejon APRN - CNP   levothyroxine (SYNTHROID) 50 MCG tablet TAKE 1 TABLET BY MOUTH ON MONDAY, TUESDAY, AND WEDNESDAY. Yes Abbi Castrejon APRN - CNP   levothyroxine (SYNTHROID) 75 MCG tablet Take 1 tablet by mouth See Admin Instructions Thursday-Sunday Yes Abbi Castrejon APRN - CNP   metFORMIN (GLUCOPHAGE) 1000 MG tablet Take 1 tablet by mouth 2 times daily (with meals) Yes Abbi Castrejon APRN - CNP   estradiol (ESTRACE VAGINAL) 0.1 MG/GM vaginal cream Place 1 g vaginally daily Yes Jorge Holland MD   Multiple Vitamins-Minerals (PRESERVISION AREDS 2+MULTI VIT PO) Take by mouth Yes Freddy Laureano MD   Omega-3 Fatty Acids ([employer] NATURAL FISH OIL PO) Take by mouth Yes Freddy Laureano MD   FREESTYLE LITE strip TEST DAILY AS NEEDED  Marry Butcher APRN - CNP   FreeStyle Lancets MISC USE ONCE A DAY.  Pedro Polanco MD   Blood Glucose Monitoring Suppl (FREESTYLE FREEDOM LITE) w/Device KIT 1 kit by Does not apply route daily  Pedro Polanco MD       Review of Systems   Review of Systems   Constitutional:  Negative for chills and fever.   HENT:  Negative for ear pain, sinus pressure and sore throat.    Eyes:  Negative for pain, discharge and redness.   Respiratory:  Negative for

## 2024-06-22 ASSESSMENT — ENCOUNTER SYMPTOMS
BACK PAIN: 0
SHORTNESS OF BREATH: 0
ABDOMINAL PAIN: 0
RHINORRHEA: 0
COUGH: 0

## 2024-06-27 ENCOUNTER — OFFICE VISIT (OUTPATIENT)
Dept: FAMILY MEDICINE CLINIC | Age: 74
End: 2024-06-27
Payer: MEDICARE

## 2024-06-27 VITALS
SYSTOLIC BLOOD PRESSURE: 129 MMHG | WEIGHT: 143 LBS | HEART RATE: 69 BPM | RESPIRATION RATE: 19 BRPM | TEMPERATURE: 97 F | HEIGHT: 66 IN | DIASTOLIC BLOOD PRESSURE: 75 MMHG | BODY MASS INDEX: 22.98 KG/M2

## 2024-06-27 DIAGNOSIS — E11.9 TYPE 2 DIABETES MELLITUS WITHOUT COMPLICATION, WITHOUT LONG-TERM CURRENT USE OF INSULIN (HCC): Primary | ICD-10-CM

## 2024-06-27 DIAGNOSIS — R92.8 ABNORMAL MAMMOGRAM OF LEFT BREAST: ICD-10-CM

## 2024-06-27 DIAGNOSIS — R22.9 SUBCUTANEOUS MASS: ICD-10-CM

## 2024-06-27 PROCEDURE — 99213 OFFICE O/P EST LOW 20 MIN: CPT | Performed by: FAMILY MEDICINE

## 2024-06-27 PROCEDURE — 83036 HEMOGLOBIN GLYCOSYLATED A1C: CPT | Performed by: FAMILY MEDICINE

## 2024-06-27 PROCEDURE — 1123F ACP DISCUSS/DSCN MKR DOCD: CPT | Performed by: FAMILY MEDICINE

## 2024-06-27 PROCEDURE — 3044F HG A1C LEVEL LT 7.0%: CPT | Performed by: FAMILY MEDICINE

## 2024-06-27 SDOH — ECONOMIC STABILITY: FOOD INSECURITY: WITHIN THE PAST 12 MONTHS, YOU WORRIED THAT YOUR FOOD WOULD RUN OUT BEFORE YOU GOT MONEY TO BUY MORE.: NEVER TRUE

## 2024-06-27 SDOH — ECONOMIC STABILITY: FOOD INSECURITY: WITHIN THE PAST 12 MONTHS, THE FOOD YOU BOUGHT JUST DIDN'T LAST AND YOU DIDN'T HAVE MONEY TO GET MORE.: NEVER TRUE

## 2024-06-27 NOTE — PROGRESS NOTES
FM Progress Note    Subjective:   Noticed last Thursday a week ago noted mass R neck near medial clavicle. Some ttp. Felt harder last week, now softer.   CT chest/neck:  IMPRESSION:  1.  Underlying the area of interest there is mild sternoclavicular joint  asymmetry that suggests a right-sided effusion which is nonspecific.  Recommend correlation with clinical findings and if indicated MRI to further  evaluate.  2. No acute intrathoracic abnormality.      No fever.   Weight stable.   No B sxs.     Hypothyroidism. H/o hashimoto's. LT4 50/75 mcg.   Lab Results   Component Value Date    TSH 3.83 05/09/2024          Came back with  from 3 wk trip to [place].   Then went back to [employer] doing flys and triceps machine and later that evening is when   Can go for 30 mile bike ride w/o sxs.     Health Maintenance Due   Topic Date Due    Diabetic retinal exam  08/10/2022    Diabetic foot exam  04/19/2023         Objective:   /75   Pulse 69   Temp 97 °F (36.1 °C)   Resp 19   Ht 1.676 m (5' 6\")   Wt 64.9 kg (143 lb)   BMI 23.08 kg/m²   General appearance: NAD, alert and interacting appropriately  HEENT: NCAT, PERRLA, EOMI   Resp: CTAB, no WRC  CVS: RRR, no MRG  Abdomen: BS +, SNDNT  Extremities: No clubbing, cyanosis, or edema. Warm. Dry.   Skin: firm, slightly ttp doughy 3 cm area superomedial clavicle. No erythema. No drainage        I have reviewed this patient's previous records.    I have reviewed this patient's labs.    I have reviewed this patient's imaging reports.    I have reviewed this patient's medications.      Assessment/Plan:    Kiersten was seen today for mass.    Diagnoses and all orders for this visit:    Type 2 diabetes mellitus without complication, without long-term current use of insulin (HCC)  -     POCT glycosylated hemoglobin (Hb A1C)    Subcutaneous mass    Effusion, likely strain/sprain  Topical voltaren    DDx: cyst vs LAD > infxn > tumor vs met        Electronically signed by

## 2024-08-02 DIAGNOSIS — R92.8 ABNORMAL MAMMOGRAM OF LEFT BREAST: ICD-10-CM

## 2024-09-03 LAB — DIABETIC RETINOPATHY: NORMAL

## 2024-10-16 ENCOUNTER — HOSPITAL ENCOUNTER (OUTPATIENT)
Age: 74
Discharge: HOME | End: 2024-10-16
Payer: MEDICARE

## 2024-10-16 LAB
ALBUMIN SERPL-MCNC: 4.3 G/DL (ref 3.5–5.2)
ALP SERPL-CCNC: 82 U/L (ref 35–104)
ALT SERPL-CCNC: 14 U/L (ref 0–32)
ANION GAP SERPL CALCULATED.3IONS-SCNC: 12 MMOL/L (ref 7–16)
AST SERPL-CCNC: 20 U/L (ref 0–31)
BASOPHILS # BLD: 0.07 K/UL (ref 0–0.2)
BASOPHILS NFR BLD: 1 % (ref 0–2)
BILIRUB SERPL-MCNC: <0.2 MG/DL (ref 0–1.2)
BUN SERPL-MCNC: 18 MG/DL (ref 6–23)
CALCIUM SERPL-MCNC: 10 MG/DL (ref 8.6–10.2)
CHLORIDE SERPL-SCNC: 106 MMOL/L (ref 98–107)
CO2 SERPL-SCNC: 25 MMOL/L (ref 22–29)
CREAT SERPL-MCNC: 1 MG/DL (ref 0.5–1)
EOSINOPHIL # BLD: 0.64 K/UL (ref 0.05–0.5)
EOSINOPHILS RELATIVE PERCENT: 9 % (ref 0–6)
ERYTHROCYTE [DISTWIDTH] IN BLOOD BY AUTOMATED COUNT: 12.5 % (ref 11.5–15)
GFR, ESTIMATED: 57 ML/MIN/1.73M2
GLUCOSE SERPL-MCNC: 146 MG/DL (ref 74–99)
HCT VFR BLD AUTO: 39.6 % (ref 34–48)
HGB BLD-MCNC: 13 G/DL (ref 11.5–15.5)
IMM GRANULOCYTES # BLD AUTO: <0.03 K/UL (ref 0–0.58)
IMM GRANULOCYTES NFR BLD: 0 % (ref 0–5)
LYMPHOCYTES NFR BLD: 1.99 K/UL (ref 1.5–4)
LYMPHOCYTES RELATIVE PERCENT: 28 % (ref 20–42)
MCH RBC QN AUTO: 29.5 PG (ref 26–35)
MCHC RBC AUTO-ENTMCNC: 32.8 G/DL (ref 32–34.5)
MCV RBC AUTO: 90 FL (ref 80–99.9)
MONOCYTES NFR BLD: 0.58 K/UL (ref 0.1–0.95)
MONOCYTES NFR BLD: 8 % (ref 2–12)
NEUTROPHILS NFR BLD: 54 % (ref 43–80)
NEUTS SEG NFR BLD: 3.87 K/UL (ref 1.8–7.3)
PLATELET # BLD AUTO: 331 K/UL (ref 130–450)
PMV BLD AUTO: 9.2 FL (ref 7–12)
POTASSIUM SERPL-SCNC: 4.7 MMOL/L (ref 3.5–5)
PROT SERPL-MCNC: 6.8 G/DL (ref 6.4–8.3)
RBC # BLD AUTO: 4.4 M/UL (ref 3.5–5.5)
SODIUM SERPL-SCNC: 143 MMOL/L (ref 132–146)
WBC OTHER # BLD: 7.2 K/UL (ref 4.5–11.5)

## 2024-10-16 PROCEDURE — 36415 COLL VENOUS BLD VENIPUNCTURE: CPT

## 2024-10-16 PROCEDURE — 80053 COMPREHEN METABOLIC PANEL: CPT

## 2024-10-16 PROCEDURE — 85025 COMPLETE CBC W/AUTO DIFF WBC: CPT

## 2024-10-16 SDOH — ECONOMIC STABILITY: FOOD INSECURITY: WITHIN THE PAST 12 MONTHS, YOU WORRIED THAT YOUR FOOD WOULD RUN OUT BEFORE YOU GOT MONEY TO BUY MORE.: NEVER TRUE

## 2024-10-16 SDOH — ECONOMIC STABILITY: FOOD INSECURITY: WITHIN THE PAST 12 MONTHS, THE FOOD YOU BOUGHT JUST DIDN'T LAST AND YOU DIDN'T HAVE MONEY TO GET MORE.: NEVER TRUE

## 2024-10-16 SDOH — ECONOMIC STABILITY: INCOME INSECURITY: HOW HARD IS IT FOR YOU TO PAY FOR THE VERY BASICS LIKE FOOD, HOUSING, MEDICAL CARE, AND HEATING?: NOT HARD AT ALL

## 2024-10-18 ENCOUNTER — OFFICE VISIT (OUTPATIENT)
Dept: FAMILY MEDICINE CLINIC | Age: 74
End: 2024-10-18

## 2024-10-18 VITALS
HEART RATE: 63 BPM | DIASTOLIC BLOOD PRESSURE: 67 MMHG | HEIGHT: 66 IN | BODY MASS INDEX: 22.98 KG/M2 | WEIGHT: 143 LBS | SYSTOLIC BLOOD PRESSURE: 115 MMHG | TEMPERATURE: 98.9 F | OXYGEN SATURATION: 99 %

## 2024-10-18 DIAGNOSIS — B35.1 ONYCHOMYCOSIS: ICD-10-CM

## 2024-10-18 DIAGNOSIS — E11.9 TYPE 2 DIABETES MELLITUS WITHOUT COMPLICATION, WITHOUT LONG-TERM CURRENT USE OF INSULIN (HCC): ICD-10-CM

## 2024-10-18 DIAGNOSIS — M25.411 EFFUSION OF RIGHT SHOULDER JOINT: Primary | ICD-10-CM

## 2024-10-18 RX ORDER — TERBINAFINE HYDROCHLORIDE 250 MG/1
250 TABLET ORAL DAILY
COMMUNITY
Start: 2024-10-16

## 2024-10-18 RX ORDER — NAPROXEN 500 MG/1
500 TABLET ORAL 2 TIMES DAILY WITH MEALS
Qty: 28 TABLET | Refills: 0 | Status: SHIPPED | OUTPATIENT
Start: 2024-10-18 | End: 2024-11-01

## 2024-10-18 NOTE — PROGRESS NOTES
FM Progress Note    Subjective:   Swelling still present over R sternoclavicular space. Pain worse with flies and definitely with roll outs. Reviewed CT soft tissue neck. Clavicular effusion, no pleural effusion.     Onychomycosis. Planning to start roal terbinafine.    ? DM. Controlled. Metformin.       Health Maintenance Due   Topic Date Due    Diabetic foot exam  04/19/2023    A1C test (Diabetic or Prediabetic)  09/14/2024       Objective:   /67   Pulse 63   Temp 98.9 °F (37.2 °C) (Temporal)   Ht 1.676 m (5' 6\")   Wt 64.9 kg (143 lb)   SpO2 99%   BMI 23.08 kg/m²   General appearance: NAD, alert and interacting appropriately  Extremities: No clubbing, cyanosis, or edema. Warm. Dry. Swelling over R medial clavicle similar to previous.         I have reviewed this patient's previous records.    I have reviewed this patient's labs.    I have reviewed this patient's imaging reports including CT    I have reviewed this patient's medications.      Assessment/Plan:    Kiersten was seen today for other and results.    Diagnoses and all orders for this visit:    Effusion of right shoulder joint  -     naproxen (NAPROSYN) 500 MG tablet; Take 1 tablet by mouth 2 times daily (with meals) for 14 days    Type 2 diabetes mellitus without complication, without long-term current use of insulin (HCC)    Onychomycosis    Ok to take terbinafine, LFTs reviewed and nl. Recheck lfts in one month  CPM DM  Avoid painful activities. Consider MRI if not improved.         There are no Patient Instructions on file for this visit.     No follow-ups on file.      Electronically signed by Jorge Holland MD on 10/18/2024 at 2:46 PM

## 2024-11-15 ENCOUNTER — HOSPITAL ENCOUNTER (OUTPATIENT)
Age: 74
Discharge: HOME | End: 2024-11-15
Payer: MEDICARE

## 2024-11-15 LAB
ALBUMIN SERPL-MCNC: 4.1 G/DL (ref 3.5–5.2)
ALP SERPL-CCNC: 75 U/L (ref 35–104)
ALT SERPL-CCNC: 14 U/L (ref 0–32)
ANION GAP SERPL CALCULATED.3IONS-SCNC: 9 MMOL/L (ref 7–16)
AST SERPL-CCNC: 20 U/L (ref 0–31)
BASOPHILS # BLD: 0.07 K/UL (ref 0–0.2)
BASOPHILS NFR BLD: 1 % (ref 0–2)
BILIRUB SERPL-MCNC: 0.3 MG/DL (ref 0–1.2)
BUN SERPL-MCNC: 17 MG/DL (ref 6–23)
CALCIUM SERPL-MCNC: 9.7 MG/DL (ref 8.6–10.2)
CHLORIDE SERPL-SCNC: 105 MMOL/L (ref 98–107)
CO2 SERPL-SCNC: 28 MMOL/L (ref 22–29)
CREAT SERPL-MCNC: 1 MG/DL (ref 0.5–1)
EOSINOPHIL # BLD: 0.72 K/UL (ref 0.05–0.5)
EOSINOPHILS RELATIVE PERCENT: 14 % (ref 0–6)
ERYTHROCYTE [DISTWIDTH] IN BLOOD BY AUTOMATED COUNT: 12.7 % (ref 11.5–15)
GFR, ESTIMATED: 59 ML/MIN/1.73M2
GLUCOSE SERPL-MCNC: 108 MG/DL (ref 74–99)
HCT VFR BLD AUTO: 40.5 % (ref 34–48)
HGB BLD-MCNC: 13.5 G/DL (ref 11.5–15.5)
IMM GRANULOCYTES # BLD AUTO: <0.03 K/UL (ref 0–0.58)
IMM GRANULOCYTES NFR BLD: 0 % (ref 0–5)
LYMPHOCYTES NFR BLD: 1.53 K/UL (ref 1.5–4)
LYMPHOCYTES RELATIVE PERCENT: 30 % (ref 20–42)
MCH RBC QN AUTO: 29.9 PG (ref 26–35)
MCHC RBC AUTO-ENTMCNC: 33.3 G/DL (ref 32–34.5)
MCV RBC AUTO: 89.8 FL (ref 80–99.9)
MONOCYTES NFR BLD: 0.45 K/UL (ref 0.1–0.95)
MONOCYTES NFR BLD: 9 % (ref 2–12)
NEUTROPHILS NFR BLD: 45 % (ref 43–80)
NEUTS SEG NFR BLD: 2.3 K/UL (ref 1.8–7.3)
PLATELET # BLD AUTO: 304 K/UL (ref 130–450)
PMV BLD AUTO: 9.1 FL (ref 7–12)
POTASSIUM SERPL-SCNC: 4.9 MMOL/L (ref 3.5–5)
PROT SERPL-MCNC: 6.7 G/DL (ref 6.4–8.3)
RBC # BLD AUTO: 4.51 M/UL (ref 3.5–5.5)
SODIUM SERPL-SCNC: 142 MMOL/L (ref 132–146)
WBC OTHER # BLD: 5.1 K/UL (ref 4.5–11.5)

## 2024-11-15 PROCEDURE — 80053 COMPREHEN METABOLIC PANEL: CPT

## 2024-11-15 PROCEDURE — 85025 COMPLETE CBC W/AUTO DIFF WBC: CPT

## 2024-11-15 PROCEDURE — 36415 COLL VENOUS BLD VENIPUNCTURE: CPT

## 2024-11-29 ENCOUNTER — HOSPITAL ENCOUNTER (OUTPATIENT)
Age: 74
Discharge: HOME | End: 2024-11-29
Payer: MEDICARE

## 2024-11-29 LAB
ALBUMIN SERPL-MCNC: 4.2 G/DL (ref 3.5–5.2)
ALP SERPL-CCNC: 71 U/L (ref 35–104)
ALT SERPL-CCNC: 14 U/L (ref 0–32)
ANION GAP SERPL CALCULATED.3IONS-SCNC: 9 MMOL/L (ref 7–16)
AST SERPL-CCNC: 18 U/L (ref 0–31)
BILIRUB SERPL-MCNC: 0.2 MG/DL (ref 0–1.2)
BUN SERPL-MCNC: 20 MG/DL (ref 6–23)
CALCIUM SERPL-MCNC: 9.7 MG/DL (ref 8.6–10.2)
CHLORIDE SERPL-SCNC: 103 MMOL/L (ref 98–107)
CHOLEST SERPL-MCNC: 225 MG/DL
CO2 SERPL-SCNC: 28 MMOL/L (ref 22–29)
CREAT SERPL-MCNC: 1.1 MG/DL (ref 0.5–1)
CREAT UR-MCNC: 78.5 MG/DL (ref 29–226)
GFR, ESTIMATED: 56 ML/MIN/1.73M2
GLUCOSE SERPL-MCNC: 101 MG/DL (ref 74–99)
HBA1C MFR BLD: 6.2 % (ref 4–5.6)
HDLC SERPL-MCNC: 83 MG/DL
LDLC SERPL CALC-MCNC: 127 MG/DL
MICROALBUMIN UR-MCNC: <12 MG/L (ref 0–19)
MICROALBUMIN/CREAT UR-RTO: NORMAL MCG/MG CREAT (ref 0–30)
POTASSIUM SERPL-SCNC: 4.6 MMOL/L (ref 3.5–5)
PROT SERPL-MCNC: 6.5 G/DL (ref 6.4–8.3)
SODIUM SERPL-SCNC: 140 MMOL/L (ref 132–146)
T4 FREE SERPL-MCNC: 1.6 NG/DL (ref 0.9–1.7)
TRIGL SERPL-MCNC: 77 MG/DL
TSH SERPL DL<=0.05 MIU/L-ACNC: 5.31 UIU/ML (ref 0.27–4.2)
VLDLC SERPL CALC-MCNC: 15 MG/DL

## 2024-11-29 PROCEDURE — 82043 UR ALBUMIN QUANTITATIVE: CPT

## 2024-11-29 PROCEDURE — 82570 ASSAY OF URINE CREATININE: CPT

## 2024-11-29 PROCEDURE — 80053 COMPREHEN METABOLIC PANEL: CPT

## 2024-11-29 PROCEDURE — 36415 COLL VENOUS BLD VENIPUNCTURE: CPT

## 2024-11-29 PROCEDURE — 84439 ASSAY OF FREE THYROXINE: CPT

## 2024-11-29 PROCEDURE — 83036 HEMOGLOBIN GLYCOSYLATED A1C: CPT

## 2024-11-29 PROCEDURE — 84443 ASSAY THYROID STIM HORMONE: CPT

## 2024-11-29 PROCEDURE — 80061 LIPID PANEL: CPT

## 2025-01-15 ENCOUNTER — APPOINTMENT (OUTPATIENT)
Dept: GENERAL RADIOLOGY | Age: 75
End: 2025-01-15
Payer: MEDICARE

## 2025-01-15 ENCOUNTER — HOSPITAL ENCOUNTER (EMERGENCY)
Age: 75
Discharge: HOME | End: 2025-01-15
Attending: EMERGENCY MEDICINE
Payer: MEDICARE

## 2025-01-15 ENCOUNTER — OFFICE VISIT (OUTPATIENT)
Dept: GASTROENTEROLOGY | Age: 75
End: 2025-01-15
Payer: MEDICARE

## 2025-01-15 VITALS
HEIGHT: 66 IN | OXYGEN SATURATION: 98 % | TEMPERATURE: 97 F | BODY MASS INDEX: 23.46 KG/M2 | SYSTOLIC BLOOD PRESSURE: 126 MMHG | WEIGHT: 146 LBS | DIASTOLIC BLOOD PRESSURE: 80 MMHG | HEART RATE: 87 BPM

## 2025-01-15 VITALS
WEIGHT: 147 LBS | TEMPERATURE: 97.6 F | BODY MASS INDEX: 23.63 KG/M2 | OXYGEN SATURATION: 100 % | RESPIRATION RATE: 16 BRPM | HEIGHT: 66 IN | SYSTOLIC BLOOD PRESSURE: 144 MMHG | DIASTOLIC BLOOD PRESSURE: 71 MMHG | HEART RATE: 69 BPM

## 2025-01-15 DIAGNOSIS — K58.2 IRRITABLE BOWEL SYNDROME WITH BOTH CONSTIPATION AND DIARRHEA: ICD-10-CM

## 2025-01-15 DIAGNOSIS — S52.502A CLOSED FRACTURE OF DISTAL END OF LEFT RADIUS, UNSPECIFIED FRACTURE MORPHOLOGY, INITIAL ENCOUNTER: Primary | ICD-10-CM

## 2025-01-15 DIAGNOSIS — K59.02 DYSSYNERGIC DEFECATION: Primary | ICD-10-CM

## 2025-01-15 PROCEDURE — 73110 X-RAY EXAM OF WRIST: CPT

## 2025-01-15 PROCEDURE — 1123F ACP DISCUSS/DSCN MKR DOCD: CPT | Performed by: STUDENT IN AN ORGANIZED HEALTH CARE EDUCATION/TRAINING PROGRAM

## 2025-01-15 PROCEDURE — 99283 EMERGENCY DEPT VISIT LOW MDM: CPT

## 2025-01-15 PROCEDURE — 99214 OFFICE O/P EST MOD 30 MIN: CPT | Performed by: STUDENT IN AN ORGANIZED HEALTH CARE EDUCATION/TRAINING PROGRAM

## 2025-01-15 PROCEDURE — 29125 APPL SHORT ARM SPLINT STATIC: CPT

## 2025-01-15 PROCEDURE — 6370000000 HC RX 637 (ALT 250 FOR IP): Performed by: EMERGENCY MEDICINE

## 2025-01-15 PROCEDURE — 73090 X-RAY EXAM OF FOREARM: CPT

## 2025-01-15 RX ORDER — OXYCODONE AND ACETAMINOPHEN 5; 325 MG/1; MG/1
1 TABLET ORAL ONCE
Status: COMPLETED | OUTPATIENT
Start: 2025-01-15 | End: 2025-01-15

## 2025-01-15 RX ORDER — IBUPROFEN 800 MG/1
800 TABLET, FILM COATED ORAL ONCE
Status: COMPLETED | OUTPATIENT
Start: 2025-01-15 | End: 2025-01-15

## 2025-01-15 RX ORDER — SENNOSIDES 8.6 MG
2 TABLET ORAL EVERY EVENING
Qty: 60 TABLET | Refills: 11 | Status: SHIPPED | OUTPATIENT
Start: 2025-01-15

## 2025-01-15 RX ORDER — ROSUVASTATIN CALCIUM 10 MG/1
10 TABLET, COATED ORAL DAILY
COMMUNITY
Start: 2024-12-06 | End: 2025-12-06

## 2025-01-15 RX ORDER — POLYETHYLENE GLYCOL 3350 17 G/17G
17 POWDER, FOR SOLUTION ORAL DAILY
Qty: 1530 G | Refills: 11 | Status: SHIPPED | OUTPATIENT
Start: 2025-01-15 | End: 2025-02-14

## 2025-01-15 RX ORDER — OXYCODONE AND ACETAMINOPHEN 5; 325 MG/1; MG/1
1 TABLET ORAL EVERY 6 HOURS PRN
Qty: 20 TABLET | Refills: 0 | Status: SHIPPED | OUTPATIENT
Start: 2025-01-15 | End: 2025-01-20

## 2025-01-15 RX ORDER — IBUPROFEN 800 MG/1
800 TABLET, FILM COATED ORAL EVERY 8 HOURS PRN
Qty: 21 TABLET | Refills: 0 | Status: SHIPPED | OUTPATIENT
Start: 2025-01-15 | End: 2025-01-22

## 2025-01-15 RX ADMIN — OXYCODONE HYDROCHLORIDE AND ACETAMINOPHEN 1 TABLET: 5; 325 TABLET ORAL at 20:49

## 2025-01-15 RX ADMIN — IBUPROFEN 800 MG: 800 TABLET, FILM COATED ORAL at 19:24

## 2025-01-15 ASSESSMENT — PAIN DESCRIPTION - LOCATION
LOCATION: ARM
LOCATION: ARM;WRIST
LOCATION: ARM

## 2025-01-15 ASSESSMENT — PAIN DESCRIPTION - DESCRIPTORS
DESCRIPTORS: ACHING
DESCRIPTORS: DISCOMFORT;SHARP
DESCRIPTORS: ACHING

## 2025-01-15 ASSESSMENT — PAIN SCALES - GENERAL
PAINLEVEL_OUTOF10: 7
PAINLEVEL_OUTOF10: 9
PAINLEVEL_OUTOF10: 7

## 2025-01-15 ASSESSMENT — PAIN - FUNCTIONAL ASSESSMENT
PAIN_FUNCTIONAL_ASSESSMENT: 0-10
PAIN_FUNCTIONAL_ASSESSMENT: PREVENTS OR INTERFERES SOME ACTIVE ACTIVITIES AND ADLS
PAIN_FUNCTIONAL_ASSESSMENT: 0-10

## 2025-01-15 ASSESSMENT — PAIN DESCRIPTION - FREQUENCY: FREQUENCY: CONTINUOUS

## 2025-01-15 ASSESSMENT — PAIN DESCRIPTION - ONSET: ONSET: ON-GOING

## 2025-01-15 ASSESSMENT — PAIN DESCRIPTION - ORIENTATION
ORIENTATION: LEFT

## 2025-01-15 ASSESSMENT — LIFESTYLE VARIABLES
HOW OFTEN DO YOU HAVE A DRINK CONTAINING ALCOHOL: NEVER
HOW MANY STANDARD DRINKS CONTAINING ALCOHOL DO YOU HAVE ON A TYPICAL DAY: PATIENT DOES NOT DRINK

## 2025-01-15 ASSESSMENT — PAIN DESCRIPTION - PAIN TYPE: TYPE: ACUTE PAIN

## 2025-01-15 NOTE — PROGRESS NOTES
Gastroenterology, Hepatology, &  Advanced Endoscopy    Progress Note      HPI:     Ms. Kiersten Mitchell is a 74y/F who presents to clinic in follow-up for IBS. After her last visit, she was started on TCA therapy but reports that she felt very groggy and overall not well with the medication so she stopped taking it. She was having intermittent bloating but reports that over the past several months she is again having alternating bowel habits with diarrhea and urgency. She reports that simethicone did not help with her bloating/gas. She reports feeling sharp, crampy pain leading up to a bowel movement which then improves with the bowel movement. She was taking scheduled fiber and pepto-bismol but is no longer doing so. She does have a history of a rectocele repair and notes that when passing soft stool, the stool is tubular. She also notes the need for excessive wiping.         ALT   Date Value Ref Range Status   11/29/2024 14 0 - 32 U/L Final   11/15/2024 14 0 - 32 U/L Final   10/16/2024 14 0 - 32 U/L Final     AST   Date Value Ref Range Status   11/29/2024 18 0 - 31 U/L Final   11/15/2024 20 0 - 31 U/L Final   10/16/2024 20 0 - 31 U/L Final     Alkaline Phosphatase   Date Value Ref Range Status   11/29/2024 71 35 - 104 U/L Final   11/15/2024 75 35 - 104 U/L Final   10/16/2024 82 35 - 104 U/L Final     Total Bilirubin   Date Value Ref Range Status   11/29/2024 0.2 0.0 - 1.2 mg/dL Final   11/15/2024 0.3 0.0 - 1.2 mg/dL Final   10/16/2024 <0.2 0.0 - 1.2 mg/dL Final      Lab Results   Component Value Date    WBC 5.1 11/15/2024    HGB 13.5 11/15/2024    HCT 40.5 11/15/2024     11/15/2024     11/29/2024    K 4.6 11/29/2024     11/29/2024    CREATININE 1.1 (H) 11/29/2024    BUN 20 11/29/2024    CO2 28 11/29/2024    FOLATE >20.0 09/07/2016    [id number] 1587 (H) 09/07/2016    GLUCOSE 101 (H) 11/29/2024    TSH 5.31 (H) 11/29/2024    LABA1C 6.2 (H) 11/29/2024       Previous Endoscopies: Date of last

## 2025-01-16 ENCOUNTER — TELEPHONE (OUTPATIENT)
Dept: ORTHOPEDIC SURGERY | Age: 75
End: 2025-01-16

## 2025-01-16 NOTE — TELEPHONE ENCOUNTER
Call placed to patient at this time. Appointment made, patient given directions to office.    Electronically signed by Yessy Angel ATC on 1/16/2025 at 4:27 PM    Future Appointments   Date Time Provider Department Center   1/17/2025  9:00 AM SCHEDULE, SE [facility]   3/7/2025  8:00 AM Jorge Holland MD [facility]   5/28/2025 12:30 PM Kole Granados MD [facility]

## 2025-01-16 NOTE — ED NOTES
Volar/thumb spica splint applied to left arm. Pt tolerated well. CMS intact prior to splint being applied as well as after. Sling given to pt incase it is needed.

## 2025-01-16 NOTE — DISCHARGE INSTRUCTIONS
XR RADIUS ULNA LEFT (2 VIEWS)   Final Result   1.  Decreased osseous mineralization.  Comminuted intra-articular impaction   fracture of the distal left radius with left wrist soft tissue swelling.      2.  Mild left wrist triscaphe and mild to moderate thumb CMC joint   osteoarthritis.  Mild degenerative spurring around the left elbow joint.         XR WRIST LEFT (MIN 3 VIEWS)   Final Result   1.  Decreased osseous mineralization.  Comminuted intra-articular impaction   fracture of the distal left radius with left wrist soft tissue swelling.      2.  Mild left wrist triscaphe and mild to moderate thumb CMC joint   osteoarthritis.  Mild degenerative spurring around the left elbow joint.

## 2025-01-16 NOTE — ED PROVIDER NOTES
[facility]  Department of Emergency Medicine   ED  Encounter Note  Admit Date/RoomTime: 1/15/2025  7:00 PM  ED Room: MARIANNA/MARIANNA    NAME: Kiersten Mitchell  : 1950  MRN: [id number]     Chief Complaint:  Arm Injury (Left arm injury. Fall today. Denies hitting head.)    History of Present Illness       Kiersten Mitchell is a 74 y.o. old female who presents to the emergency department by private vehicle, for traumatic Left wrist and forearm pain which occured 1 hour(s) prior to arrival.  The complaint is due to a slip and fall while walking into [place] when she slipped on some ice.  Patient has no prior history of pain/injury with regards to today's visit.  She is right handed.    Since onset the symptoms have been persistent.  Her pain is aggraveated by any movement or any use of and relieved by nothing, as no treatment has been provided prior to this visit. She denies any head injury, headache, loss of consciousness, neck pain, chest pain, or abdominal pain.    ROS   Pertinent positives and negatives are stated within HPI, all other systems reviewed and are negative.    Past Medical History:  has a past medical history of Actinic keratosis, Diabetes (HCC), Early dry stage nonexudative age-related macular degeneration, Hearing loss, Hyperlipidemia, Hypothyroidism, Lichen sclerosus, Neuropathy, Prolonged emergence from general anesthesia, Rotator cuff impingement syndrome, Spinal arthritis, Spinal stenosis, Trigger finger of right thumb, Type 2 diabetes mellitus without complication (HCC), and Vertigo.    Surgical History:  has a past surgical history that includes Dilation and curettage of uterus; Tubal ligation; Hysterectomy; Thyroidectomy, partial (2008); Breast biopsy; Colonoscopy; Colonoscopy (10/01/2019); Colonoscopy (N/A, 10/01/2019); Mohs surgery (Right); Rectocele repair; Colonoscopy (N/A, 10/24/2022); Hysterectomy, vaginal; and Upper gastrointestinal endoscopy.    Social History:  reports

## 2025-01-16 NOTE — TELEPHONE ENCOUNTER
Pt seen in [place] 1/15/25 d/t Closed fracture of distal end of left radius. Lt arm thumb spica applied with sling. Pls advise when pt can be seen

## 2025-01-16 NOTE — TELEPHONE ENCOUNTER
I do not see evidence based on XR or ED note that she needs a thumb spica. She does need the wrist immobilized. We can add her on tomorrow morning for a new splint.

## 2025-01-17 ENCOUNTER — OFFICE VISIT (OUTPATIENT)
Dept: ORTHOPEDIC SURGERY | Age: 75
End: 2025-01-17
Payer: MEDICARE

## 2025-01-17 ENCOUNTER — HOSPITAL ENCOUNTER (OUTPATIENT)
Dept: GENERAL RADIOLOGY | Age: 75
Discharge: HOME | End: 2025-01-19
Payer: MEDICARE

## 2025-01-17 VITALS
OXYGEN SATURATION: 94 % | SYSTOLIC BLOOD PRESSURE: 162 MMHG | TEMPERATURE: 97.8 F | HEART RATE: 54 BPM | DIASTOLIC BLOOD PRESSURE: 79 MMHG | RESPIRATION RATE: 16 BRPM

## 2025-01-17 DIAGNOSIS — S52.502A CLOSED FRACTURE OF DISTAL END OF LEFT RADIUS, UNSPECIFIED FRACTURE MORPHOLOGY, INITIAL ENCOUNTER: Primary | ICD-10-CM

## 2025-01-17 DIAGNOSIS — S52.502A CLOSED FRACTURE OF DISTAL END OF LEFT RADIUS, UNSPECIFIED FRACTURE MORPHOLOGY, INITIAL ENCOUNTER: ICD-10-CM

## 2025-01-17 PROCEDURE — 99203 OFFICE O/P NEW LOW 30 MIN: CPT | Performed by: PHYSICIAN ASSISTANT

## 2025-01-17 PROCEDURE — 1160F RVW MEDS BY RX/DR IN RCRD: CPT | Performed by: PHYSICIAN ASSISTANT

## 2025-01-17 PROCEDURE — 1123F ACP DISCUSS/DSCN MKR DOCD: CPT | Performed by: PHYSICIAN ASSISTANT

## 2025-01-17 PROCEDURE — 73110 X-RAY EXAM OF WRIST: CPT

## 2025-01-17 PROCEDURE — 1159F MED LIST DOCD IN RCRD: CPT | Performed by: PHYSICIAN ASSISTANT

## 2025-01-17 RX ORDER — ERGOCALCIFEROL 1.25 MG/1
50000 CAPSULE, LIQUID FILLED ORAL WEEKLY
Qty: 12 CAPSULE | Refills: 1 | Status: SHIPPED | OUTPATIENT
Start: 2025-01-17

## 2025-01-17 NOTE — PROGRESS NOTES
A well padded, short volar splint was applied to his/her Left UE  Neurovascular status was checked pre and post application.  Patient was neurovascularly intact after the application process.  The patient denied any issues with fit or comfort of the cast/splint.  Patient insrtucted to keep cast/splint clean and dry, do not get wet, and NO activities that put them at risk for falling.  Patient instructed to call our office if there are any issues with the cast/splint.      Electronically signed by Reece Chester MA on 1/17/2025 at 11:02 AM       
or open wounds, no suspicious skin lesions noted  Psych: Affect euthymic   Musculoskeletal:    Extremity:  Left Upper Extremity  Well-fitting splint was removed today and skin intact with no open wounds or signs of infection.  Moderate diffuse edema throughout the wrist and distal forearm  Radial pulse palpable, fingers warm with BCR  Flex/extension intact to wrist, thumb and fingers  Finger opposition intact  Finger adduction/abduction intact  Finger crossover intact  Subjectively states sensation intact to radial/medial/ulnar distribution      BP (!) 162/79 (Site: Right Upper Arm, Position: Sitting, Cuff Size: Medium Adult)   Pulse 54   Temp 97.8 °F (36.6 °C)   Resp 16   SpO2 94%      XR: 1/17/25     3 views of L wrist after splinting today demonstrating dorsally angulated intra-articular distal radius fracture. No significant change in alignment.  No acute fractures or dislocations or any other osseus abnormality identified.          TWO XRAY VIEWS OF THE LEFT FOREARM; 3 XRAY VIEWS OF THE LEFT WRIST     1/15/2025 7:30 pm     COMPARISON:  None.     HISTORY:  ORDERING SYSTEM PROVIDED HISTORY: pain, fall  TECHNOLOGIST PROVIDED HISTORY:  Reason for exam:->pain, fall     FINDINGS:  LEFT FOREARM:     Left elbow joint appears properly aligned.  Mild degenerative spurring seen  around the left elbow joint.  Radial head and radial head/neck region appears  intact.  Proximal to mid left radius and ulna appear intact.  Osseous  mineralization is decreased.     LEFT WRIST:     Distal left ulna appears intact.  There is a comminuted impaction fracture of  the distal left radius with articular extension.  Along the articular surface  there is slight cortical offset.  Left wrist soft tissue swelling.  Radiocarpal articulation is maintained.  Carpal bones appear intact.  Mild  left wrist triscaphe and mild to moderate left thumb CMC joint  osteoarthritis.  Osseous mineralization is decreased.     IMPRESSION:  1.  Decreased

## 2025-01-22 ENCOUNTER — OFFICE VISIT (OUTPATIENT)
Dept: ORTHOPEDIC SURGERY | Age: 75
End: 2025-01-22
Payer: MEDICARE

## 2025-01-22 ENCOUNTER — HOSPITAL ENCOUNTER (OUTPATIENT)
Dept: GENERAL RADIOLOGY | Age: 75
Discharge: HOME | End: 2025-01-24
Payer: MEDICARE

## 2025-01-22 VITALS
DIASTOLIC BLOOD PRESSURE: 82 MMHG | TEMPERATURE: 97.8 F | RESPIRATION RATE: 16 BRPM | OXYGEN SATURATION: 97 % | SYSTOLIC BLOOD PRESSURE: 140 MMHG | HEART RATE: 63 BPM

## 2025-01-22 DIAGNOSIS — S52.502A CLOSED FRACTURE OF DISTAL END OF LEFT RADIUS, UNSPECIFIED FRACTURE MORPHOLOGY, INITIAL ENCOUNTER: Primary | ICD-10-CM

## 2025-01-22 DIAGNOSIS — S52.502D CLOSED FRACTURE OF DISTAL END OF LEFT RADIUS WITH ROUTINE HEALING, UNSPECIFIED FRACTURE MORPHOLOGY, SUBSEQUENT ENCOUNTER: Primary | ICD-10-CM

## 2025-01-22 DIAGNOSIS — S52.502A CLOSED FRACTURE OF DISTAL END OF LEFT RADIUS, UNSPECIFIED FRACTURE MORPHOLOGY, INITIAL ENCOUNTER: ICD-10-CM

## 2025-01-22 PROCEDURE — 99213 OFFICE O/P EST LOW 20 MIN: CPT | Performed by: PHYSICIAN ASSISTANT

## 2025-01-22 PROCEDURE — 29075 APPL CST ELBW FNGR SHORT ARM: CPT | Performed by: PHYSICIAN ASSISTANT

## 2025-01-22 PROCEDURE — 73110 X-RAY EXAM OF WRIST: CPT

## 2025-01-22 NOTE — PROGRESS NOTES
Chief Complaint   Patient presents with    Follow-up     Follow up Closed rx left distal radius DOI: 1/15/25, Splint intact to left arm. States pain is 5/10     DOI 1/15/25    Subjective:  Kiersten Mitchell is approximately 1 week follow-up from the above injury.  She been nonweightbearing to the left upper extremity in a splint.  Using ice, over-the-counter Tylenol interval, and occasional Percocet for breakthrough pain patient does have some moderate intermittent pain at the left wrist and forearm.     Review of Systems -  all pertinent positives and negatives in HPI.      Objective:    General: Alert and oriented X 3, normocephalic atraumatic, external ears and eye normal, sclera clear, no acute distress, respirations easy and unlabored with no audible wheezes, skin warm and dry, speech and dress appropriate for noted age, affect euthymic.    Extremity:  Left Upper Extremity  Skin is clean dry and intact  Mild edema noted at the thumb and fingers  Radial pulse palpable, fingers warm with BCR  Flex/extension intact to thumb and fingers  Finger opposition intact  Finger adduction/abduction intact  Finger crossover intact  Subjectively states sensation intact to radial/medial/ulnar distribution      Vitals:    01/22/25 0805   BP: (!) 140/82   Site: Right Upper Arm   Position: Sitting   Cuff Size: Medium Adult   Pulse: 63   Resp: 16   Temp: 97.8 °F (36.6 °C)   SpO2: 97%       XR:   3 views of L wrist demonstrating no significant change in alignment of the distal radius fx. No other acute fractures or dislocations or any other osseus abnormality identified.    Assessment:   Diagnosis Orders   1. Closed fracture of distal end of left radius with routine healing, unspecified fracture morphology, subsequent encounter            Plan:  Reviewed imaging with patient today in office   Otc analgesics, RICE tx prn  NWB L UE   Transitioned to short arm cast today  Seen by Dr Prakash as well    Follow up 3-4 weeks with XR of the L

## 2025-01-22 NOTE — PROGRESS NOTES
A well padded, short arm cast was applied to his/her Left UE.  Neurovascular status was checked pre and post application.  Patient was neurovascularly intact after the application process.  The patient denied any issues with fit or comfort of the cast/splint.  Patient insrtucted to keep cast/splint clean and dry, do not get wet, and NO activities that put them at risk for falling.  Patient instructed to call our office if there are any issues with the cast/splint.      Electronically signed by Yessy Angel ATC on 1/22/2025 at 9:15 AM

## 2025-02-07 DIAGNOSIS — S52.502D CLOSED FRACTURE OF DISTAL END OF LEFT RADIUS WITH ROUTINE HEALING, UNSPECIFIED FRACTURE MORPHOLOGY, SUBSEQUENT ENCOUNTER: Primary | ICD-10-CM

## 2025-02-12 ENCOUNTER — HOSPITAL ENCOUNTER (OUTPATIENT)
Dept: GENERAL RADIOLOGY | Age: 75
Discharge: HOME | End: 2025-02-14
Payer: MEDICARE

## 2025-02-12 ENCOUNTER — OFFICE VISIT (OUTPATIENT)
Dept: ORTHOPEDIC SURGERY | Age: 75
End: 2025-02-12
Payer: MEDICARE

## 2025-02-12 VITALS
HEIGHT: 67 IN | DIASTOLIC BLOOD PRESSURE: 74 MMHG | WEIGHT: 144 LBS | HEART RATE: 72 BPM | BODY MASS INDEX: 22.6 KG/M2 | SYSTOLIC BLOOD PRESSURE: 136 MMHG

## 2025-02-12 DIAGNOSIS — S52.502D CLOSED FRACTURE OF DISTAL END OF LEFT RADIUS WITH ROUTINE HEALING, UNSPECIFIED FRACTURE MORPHOLOGY, SUBSEQUENT ENCOUNTER: ICD-10-CM

## 2025-02-12 DIAGNOSIS — S52.502D CLOSED FRACTURE OF DISTAL END OF LEFT RADIUS WITH ROUTINE HEALING, UNSPECIFIED FRACTURE MORPHOLOGY, SUBSEQUENT ENCOUNTER: Primary | ICD-10-CM

## 2025-02-12 PROCEDURE — 1123F ACP DISCUSS/DSCN MKR DOCD: CPT | Performed by: PHYSICIAN ASSISTANT

## 2025-02-12 PROCEDURE — 99212 OFFICE O/P EST SF 10 MIN: CPT | Performed by: PHYSICIAN ASSISTANT

## 2025-02-12 PROCEDURE — 73110 X-RAY EXAM OF WRIST: CPT

## 2025-02-12 PROCEDURE — 1125F AMNT PAIN NOTED PAIN PRSNT: CPT | Performed by: PHYSICIAN ASSISTANT

## 2025-02-12 PROCEDURE — L3809 WHFO W/O JOINTS PRE OTS: HCPCS | Performed by: PHYSICIAN ASSISTANT

## 2025-02-12 PROCEDURE — 99213 OFFICE O/P EST LOW 20 MIN: CPT | Performed by: PHYSICIAN ASSISTANT

## 2025-02-12 PROCEDURE — 1159F MED LIST DOCD IN RCRD: CPT | Performed by: PHYSICIAN ASSISTANT

## 2025-02-12 NOTE — PROGRESS NOTES
Chief Complaint   Patient presents with    Fracture     Patient here for follow up on non-op closed fx left distal radius. DOI 01/15/2025. Patient still has cast on wrist. Patient states that she feels like a stabbing pain in certain spot from time to time.        SUBJECTIVE: Heaven Caruso is a 74-year-old female who presents for follow-up for a left distal radius fracture treated nonoperatively sustained 1/15/2025.  She is now 4 weeks out from injury.  She does have some intermittent pain in the wrist.  She has been in a short arm cast.  She states the swelling has gone down in her wrist.  She has been trying to wiggle her fingers in the cast to help with stiffness and swelling.  She is right-hand dominant.  She is going on vacation in a couple weeks.  Denies numbness, tingling or paresthesias.  No other orthopedic complaints at this time.    Review of Systems -   General ROS: negative for - chills, fatigue, fever or night sweats  Respiratory ROS: no cough, shortness of breath, or wheezing  Cardiovascular ROS: no chest pain or dyspnea on exertion  Gastrointestinal ROS: no abdominal pain, change in bowel habits, or black or bloody stools  Genitourinary: no hematuria, dysuria, or incontinence   Musculoskeletal ROS:see above  Neurological ROS: no TIA or stroke symptoms     OBJECTIVE:   Alert and oriented X 3, no acute distress, respirations easy and unlabored with no audible wheezes, skin warm and dry, speech and dress appropriate for noted age, affect euthymic.    Extremity:  Left Upper Extremity  Skin is clean dry and intact  Mild edema noted to the wrist/hand  Radial pulse palpable, fingers warm with BCR  Flex/extension intact to wrist, thumb and fingers  Wiggles all fingers with some limitation due to swelling  Subjectively states sensation intact to radial/medial/ulnar distribution    XR: 2/12/25   3 views left wrist demonstrate left distal radius fracture with mildly increased dorsal angulation however fracture

## 2025-02-12 NOTE — PATIENT INSTRUCTIONS
Nonweightbearing left upper extremity.    Patient was placed into a removable left wrist brace.  For the next week, brace should only be removed for hygiene.  After 1 week, can start to remove brace for gentle range of motion exercises of the wrist.    Patient will be set up for OT at [facility] in [place] to start in 2 weeks.    Follow-up in 5-6 weeks after returning from vacation for reevaluation and x-rays.    Call if any questions or concerns

## 2025-02-24 ENCOUNTER — EVALUATION (OUTPATIENT)
Dept: OCCUPATIONAL THERAPY | Age: 75
End: 2025-02-24
Payer: MEDICARE

## 2025-02-24 DIAGNOSIS — S52.502D CLOSED FRACTURE OF DISTAL END OF LEFT RADIUS WITH ROUTINE HEALING, UNSPECIFIED FRACTURE MORPHOLOGY, SUBSEQUENT ENCOUNTER: Primary | ICD-10-CM

## 2025-02-24 PROCEDURE — 97165 OT EVAL LOW COMPLEX 30 MIN: CPT | Performed by: OCCUPATIONAL THERAPIST

## 2025-02-24 PROCEDURE — 97110 THERAPEUTIC EXERCISES: CPT | Performed by: OCCUPATIONAL THERAPIST

## 2025-02-24 PROCEDURE — 97530 THERAPEUTIC ACTIVITIES: CPT | Performed by: OCCUPATIONAL THERAPIST

## 2025-02-24 NOTE — PROGRESS NOTES
Radial Deviation 0-20* 10*       Ulnar Deviation 0-30* 15*        Comment: Hand Dominance is R    Edema Description/Circumferential Measurements:   L figure 8: 44 cm   R figure 8: 42.5 cm    Pinch and  strength: TBA when appropriate     9 Hole Peg test IE    Left 25 sec    Right 20 sec       QuickDash IE      70%      Intervention:     Education:     Edema management: compression sleeve size E issued to be worn at night and on/off throughout the day. Retrograde massage: utilizing lotion ( w/ farrah butter) starting at finger tips and massaging down towards the elbow. Pt is to complete as often as possible on dorsal and volar portion of the arm. Elevation and ice for decreasing swelling,Contrast bath discussed to be completed for decreasing edema as often as tolerated/ desired.     Brace management: Brace should be worn on in between exercises and at night. Then on March 10: Brace to be worn during night and heavy duty tasks. Pt may remove to complete light tasks such as folding laundry and for hygiene.      Pain management: Discussed modalities for decreasing stiffness and pain ( heat and ice) as well as NSAIDs for edema.     Exercises: Reviewed and issued to pt for HEP. Pt completed x5 of each with therapist cues for proper completion: Towel scrunches. Pt is to complete 2 x5 atleast 3x/day.       Eval Complexity: low  Profile and History- Chart reviewed  Assessment of Occupational Performance and Identification of Deficits- 10   Clinical Decision Making- no additional modifications required    Rehab Potential:                                 [x] Good  [] Fair  [] Poor        Suggested Professional Referral:       [x] No  [] Yes:  Barriers to Goal Achievement::          [x] No  [] Yes:  Domestic Concerns:                           [x] No  [] Yes:       Patient. Education:  [x] Plans/Goals, Risks/Benefits discussed  [x] Home exercise program  Method of Education: [x] Verbal  [x] Demo  [x] Written  Comprehension of

## 2025-02-26 ENCOUNTER — TREATMENT (OUTPATIENT)
Dept: OCCUPATIONAL THERAPY | Age: 75
End: 2025-02-26

## 2025-02-26 DIAGNOSIS — S52.502D CLOSED FRACTURE OF DISTAL END OF LEFT RADIUS WITH ROUTINE HEALING, UNSPECIFIED FRACTURE MORPHOLOGY, SUBSEQUENT ENCOUNTER: Primary | ICD-10-CM

## 2025-02-26 NOTE — PROGRESS NOTES
OCCUPATIONAL THERAPY DAILY NOTE  [facility] PHYSICIANS [place] [facility] OCCUPATIONAL THERAPY  [address]  [address] FLO[address]  [facility] [address]  Dept: [phone]  Loc: [phone]   [facility] OT Fax: [phone]      Date:  2025    Initial Evaluation Date: 2025                            Evaluating Therapist: Christine Lassiter OT     Patient Name:  Kiersten Mitchell                         :  1950     Restrictions/Precautions:  Follow appropriate protocol, low fall risk  Diagnosis:  S52.502D (ICD-10-CM) - Closed fracture of distal end of left radius with routine healing, unspecified fracture morphology, subsequent encounter  Date of Surgery/Injury: 01/15/2025     Insurance/Certification information:  [employer]- Medicare  Plan of care signed (Y/N): N  Visit# / total visits: -     Referring Practitioner:  Darius Fernandez PA  Specific Practitioner Orders: OT 2-3 days a week for 6-8 weeks.  NWB LUE.  Would like for OT to start in 2 weeks.     OT PLAN OF CARE   OT POC based on physician orders, patient diagnosis and results of clinical assessment     Frequency/Duration1-2x / week for 12-18 visits.   Certification period From: 2025 To: 25     GOALS (Long term same as Short term):  1) Patient will demonstrate good understanding of home program (exercises/activities/diagnosis/prognosis/goals) with good accuracy.   2) Patient will demonstrate increased active/passive range of motion of their LUE by atleast 10-20* for ADL/IADL completion.  3) Patient will demonstrate increased /pinch strength of at least 10 / 3-5 pinch pounds of their L hand.   4) Patient to report decreased pain in their affected L upper extremity from 8/10 to 2/10 or less with resistive functional use.   5) Increase in fine motor function as evidenced by decreased time to complete 9-hole peg test and/or MRMT test by at least 5-10 seconds with LUE in order to complete

## 2025-03-03 ENCOUNTER — TREATMENT (OUTPATIENT)
Dept: OCCUPATIONAL THERAPY | Age: 75
End: 2025-03-03
Payer: MEDICARE

## 2025-03-03 DIAGNOSIS — S52.502D CLOSED FRACTURE OF DISTAL END OF LEFT RADIUS WITH ROUTINE HEALING, UNSPECIFIED FRACTURE MORPHOLOGY, SUBSEQUENT ENCOUNTER: Primary | ICD-10-CM

## 2025-03-03 PROCEDURE — 97110 THERAPEUTIC EXERCISES: CPT

## 2025-03-03 PROCEDURE — 97022 WHIRLPOOL THERAPY: CPT

## 2025-03-03 PROCEDURE — 97530 THERAPEUTIC ACTIVITIES: CPT

## 2025-03-03 PROCEDURE — 97140 MANUAL THERAPY 1/> REGIONS: CPT

## 2025-03-03 NOTE — PROGRESS NOTES
OCCUPATIONAL THERAPY DAILY NOTE  [facility] PHYSICIANS [place] [facility]  OCCUPATIONAL THERAPY  [address]  2ND FLO[address]  [facility] [address]  Dept: [phone]  Loc: [phone]   [facility] OT Fax: [phone]      Date:  3/3/2025    Initial Evaluation Date: 2025                            Evaluating Therapist: Christine Lassiter OT     Patient Name:  Kiersten Mitchell                         :  1950     Restrictions/Precautions:  Follow appropriate protocol, low fall risk  Diagnosis:  S52.502D (ICD-10-CM) - Closed fracture of distal end of left radius with routine healing, unspecified fracture morphology, subsequent encounter  Date of Surgery/Injury: 01/15/2025     Insurance/Certification information:  [employer]- Medicare  Plan of care signed (Y/N): Electronic  Visit# / total visits: 3 / 12-     Referring Practitioner:  Darius Fernandez PA  Specific Practitioner Orders: OT 2-3 days a week for 6-8 weeks.  NWB JONATHANE.  Would like for OT to start in 2 weeks.     OT PLAN OF CARE   OT POC based on physician orders, patient diagnosis and results of clinical assessment     Frequency/Duration1-2x / week for 12-18 visits.   Certification period From: 2025 To: 25     GOALS (Long term same as Short term):  1) Patient will demonstrate good understanding of home program with good accuracy.   2) Patient will demonstrate increased active/passive range of motion of their LUE by atleast 10-20* for ADL/IADL completion.  3) Patient will demonstrate increased /pinch strength of at least 10 / 3-5 pinch pounds of their L hand.   4) Patient to report decreased pain in their affected L upper extremity from 8/10 to 2/10 or less with resistive functional use.   5) Increase in fine motor function as evidenced by decreased time to complete 9-hole peg test and/or MRMT test by at least 5-10 seconds with LUE in order to complete fasteners.   6) Patient to report 100%

## 2025-03-04 LAB — DIABETIC RETINOPATHY: NEGATIVE

## 2025-03-17 SDOH — HEALTH STABILITY: PHYSICAL HEALTH: ON AVERAGE, HOW MANY DAYS PER WEEK DO YOU ENGAGE IN MODERATE TO STRENUOUS EXERCISE (LIKE A BRISK WALK)?: 5 DAYS

## 2025-03-17 SDOH — ECONOMIC STABILITY: INCOME INSECURITY: IN THE LAST 12 MONTHS, WAS THERE A TIME WHEN YOU WERE NOT ABLE TO PAY THE MORTGAGE OR RENT ON TIME?: NO

## 2025-03-17 SDOH — HEALTH STABILITY: PHYSICAL HEALTH: ON AVERAGE, HOW MANY MINUTES DO YOU ENGAGE IN EXERCISE AT THIS LEVEL?: 60 MIN

## 2025-03-17 SDOH — ECONOMIC STABILITY: FOOD INSECURITY: WITHIN THE PAST 12 MONTHS, YOU WORRIED THAT YOUR FOOD WOULD RUN OUT BEFORE YOU GOT MONEY TO BUY MORE.: NEVER TRUE

## 2025-03-17 SDOH — ECONOMIC STABILITY: FOOD INSECURITY: WITHIN THE PAST 12 MONTHS, THE FOOD YOU BOUGHT JUST DIDN'T LAST AND YOU DIDN'T HAVE MONEY TO GET MORE.: NEVER TRUE

## 2025-03-17 ASSESSMENT — PATIENT HEALTH QUESTIONNAIRE - PHQ9
SUM OF ALL RESPONSES TO PHQ QUESTIONS 1-9: 0
2. FEELING DOWN, DEPRESSED OR HOPELESS: NOT AT ALL
1. LITTLE INTEREST OR PLEASURE IN DOING THINGS: NOT AT ALL
SUM OF ALL RESPONSES TO PHQ QUESTIONS 1-9: 0

## 2025-03-17 ASSESSMENT — LIFESTYLE VARIABLES
HOW OFTEN DO YOU HAVE SIX OR MORE DRINKS ON ONE OCCASION: 1
HOW OFTEN DO YOU HAVE A DRINK CONTAINING ALCOHOL: 4
HOW MANY STANDARD DRINKS CONTAINING ALCOHOL DO YOU HAVE ON A TYPICAL DAY: 1 OR 2
HOW MANY STANDARD DRINKS CONTAINING ALCOHOL DO YOU HAVE ON A TYPICAL DAY: 1
HOW OFTEN DO YOU HAVE A DRINK CONTAINING ALCOHOL: 2-3 TIMES A WEEK

## 2025-03-18 ENCOUNTER — TREATMENT (OUTPATIENT)
Dept: OCCUPATIONAL THERAPY | Age: 75
End: 2025-03-18

## 2025-03-18 DIAGNOSIS — S52.502D CLOSED FRACTURE OF DISTAL END OF LEFT RADIUS WITH ROUTINE HEALING, UNSPECIFIED FRACTURE MORPHOLOGY, SUBSEQUENT ENCOUNTER: Primary | ICD-10-CM

## 2025-03-18 NOTE — PROGRESS NOTES
OCCUPATIONAL THERAPY DAILY NOTE  [facility] PHYSICIANS [place] [facility]ARE [facility] OCCUPATIONAL THERAPY  [address]  2ND FLOOR  [facility] [address]  Dept: [phone]  Loc: [phone]   [facility] OT Fax: [phone]      Date:  3/18/2025    Initial Evaluation Date: 2025                            Evaluating Therapist: Christine Lassiter OT     Patient Name:  Kiersten Mitchell                         :  1950     Restrictions/Precautions:  Follow appropriate protocol, low fall risk  Diagnosis:  S52.502D (ICD-10-CM) - Closed fracture of distal end of left radius with routine healing, unspecified fracture morphology, subsequent encounter  Date of Surgery/Injury: 01/15/2025     Insurance/Certification information:  [employer]- Medicare  Plan of care signed (Y/N): Electronic  Visit# / total visits: -     Referring Practitioner:  Darius Fernandez PA  Specific Practitioner Orders: OT 2-3 days a week for 6-8 weeks.  NWB JONATHANE.  Would like for OT to start in 2 weeks.     OT PLAN OF CARE   OT POC based on physician orders, patient diagnosis and results of clinical assessment     Frequency/Duration1-2x / week for 12-18 visits.   Certification period From: 2025 To: 25     GOALS (Long term same as Short term):  1) Patient will demonstrate good understanding of home program with good accuracy.   2) Patient will demonstrate increased active/passive range of motion of their LUE by atleast 10-20* for ADL/IADL completion.  3) Patient will demonstrate increased /pinch strength of at least 10 / 3-5 pinch pounds of their L hand.   4) Patient to report decreased pain in their affected L upper extremity from 8/10 to 2/10 or less with resistive functional use.   5) Increase in fine motor function as evidenced by decreased time to complete 9-hole peg test and/or MRMT test by at least 5-10 seconds with LUE in order to complete fasteners.   6) Patient to report 100%

## 2025-03-19 ENCOUNTER — HOSPITAL ENCOUNTER (OUTPATIENT)
Dept: GENERAL RADIOLOGY | Age: 75
Discharge: HOME | End: 2025-03-21
Payer: MEDICARE

## 2025-03-19 ENCOUNTER — OFFICE VISIT (OUTPATIENT)
Dept: ORTHOPEDIC SURGERY | Age: 75
End: 2025-03-19
Payer: MEDICARE

## 2025-03-19 VITALS
SYSTOLIC BLOOD PRESSURE: 128 MMHG | RESPIRATION RATE: 14 BRPM | DIASTOLIC BLOOD PRESSURE: 67 MMHG | HEART RATE: 73 BPM | OXYGEN SATURATION: 98 %

## 2025-03-19 DIAGNOSIS — S52.502D CLOSED FRACTURE OF DISTAL END OF LEFT RADIUS WITH ROUTINE HEALING, UNSPECIFIED FRACTURE MORPHOLOGY, SUBSEQUENT ENCOUNTER: ICD-10-CM

## 2025-03-19 DIAGNOSIS — S52.502D CLOSED FRACTURE OF DISTAL END OF LEFT RADIUS WITH ROUTINE HEALING, UNSPECIFIED FRACTURE MORPHOLOGY, SUBSEQUENT ENCOUNTER: Primary | ICD-10-CM

## 2025-03-19 DIAGNOSIS — S52.592D OTHER CLOSED FRACTURE OF DISTAL END OF LEFT RADIUS WITH ROUTINE HEALING, SUBSEQUENT ENCOUNTER: Primary | ICD-10-CM

## 2025-03-19 PROCEDURE — 99212 OFFICE O/P EST SF 10 MIN: CPT | Performed by: PHYSICIAN ASSISTANT

## 2025-03-19 PROCEDURE — 1123F ACP DISCUSS/DSCN MKR DOCD: CPT | Performed by: PHYSICIAN ASSISTANT

## 2025-03-19 PROCEDURE — 99213 OFFICE O/P EST LOW 20 MIN: CPT | Performed by: PHYSICIAN ASSISTANT

## 2025-03-19 PROCEDURE — 73110 X-RAY EXAM OF WRIST: CPT

## 2025-03-19 NOTE — PROGRESS NOTES
Relation Age of Onset    Cancer Mother         pancreatic    Hearing Loss Mother     Diabetes Father     Cancer Father         Prostate    Heart Failure Father     Stroke Father         due to medical intervention    Arthritis Father     Coronary Art Dis Father     Heart Attack Father     Heart Disease Father     Osteoarthritis Father     Prostate Cancer Father     Cancer Sister         Breast CA x 3    Breast Cancer Sister     Kidney Disease Maternal Grandmother     Heart Disease Maternal Grandfather     Heart Attack Maternal Grandfather     Hearing Loss Maternal Grandfather     Cancer Paternal Grandmother     Heart Failure Paternal Grandfather     Heart Disease Paternal Grandfather     Other Other         No FMH of MM       Review of Systems  Constitutional: Negative for fever, chills, diaphoresis, appetite change and fatigue.   HENT: Negative for dental issues, hearing loss and tinnitus. Negative for congestion, sinus pressure, sneezing, sore throat. Negative for headache.  Eyes: Negative for visual disturbance, blurred and double vision. Negative for pain, discharge, redness and itching  Respiratory: Negative for cough, shortness of breath and wheezing.   Cardiovascular: Negative for chest pain, palpitations and leg swelling. No dyspnea on exertion   Gastrointestinal:   Negative for nausea, vomiting, abdominal pain, diarrhea, constipation  or black or bloody.  Hematologic\Lymphatic:  negative for bleeding, petechiae,   Genitourinary: Negative for hematuria and difficulty urinating.   Musculoskeletal: Negative for neck pain and stiffness. Negative for back pain, see HPI  Skin: Negative for pallor, rash and wound.   Neurological: Negative for dizziness, tremors, seizures, weakness, light-headedness, no TIA or stroke symptoms. No numbness and headaches.   Psychiatric/Behavioral: Negative.        SUBJECTIVE:      Constitutional:    The patient is alert and oriented x 3, appears to be stated age and in no

## 2025-03-19 NOTE — PATIENT INSTRUCTIONS
WB:  Partial weight bearing on left upper extremity, can advance weightbearing up to 5 pounds.     Can wean out of cock up wrist brace as tolerated    Therapy: Attend therapy for ROM exercises     Pain control : Over the counter analgesia as needed    Continue with ice to the injured extremity 2-3 times per day for swelling  If able continue with elevation and compression    If your numbness and tingling does not get better, please call and we will order and EMG/NCS test.     Information on Dupuytren's provided.     Follow up in 6 weeks with repeat xrays

## 2025-03-20 ENCOUNTER — OFFICE VISIT (OUTPATIENT)
Dept: FAMILY MEDICINE CLINIC | Age: 75
End: 2025-03-20
Payer: MEDICARE

## 2025-03-20 ENCOUNTER — TREATMENT (OUTPATIENT)
Dept: OCCUPATIONAL THERAPY | Age: 75
End: 2025-03-20

## 2025-03-20 VITALS
HEIGHT: 67 IN | WEIGHT: 146 LBS | SYSTOLIC BLOOD PRESSURE: 128 MMHG | DIASTOLIC BLOOD PRESSURE: 79 MMHG | OXYGEN SATURATION: 99 % | TEMPERATURE: 97.7 F | HEART RATE: 72 BPM | BODY MASS INDEX: 22.91 KG/M2

## 2025-03-20 DIAGNOSIS — Z00.00 MEDICARE ANNUAL WELLNESS VISIT, SUBSEQUENT: Primary | ICD-10-CM

## 2025-03-20 DIAGNOSIS — S52.502D CLOSED FRACTURE OF DISTAL END OF LEFT RADIUS WITH ROUTINE HEALING, UNSPECIFIED FRACTURE MORPHOLOGY, SUBSEQUENT ENCOUNTER: Primary | ICD-10-CM

## 2025-03-20 DIAGNOSIS — E11.9 TYPE 2 DIABETES MELLITUS WITHOUT COMPLICATION, WITHOUT LONG-TERM CURRENT USE OF INSULIN: ICD-10-CM

## 2025-03-20 LAB — HBA1C MFR BLD: 6.1 %

## 2025-03-20 PROCEDURE — G0439 PPPS, SUBSEQ VISIT: HCPCS | Performed by: FAMILY MEDICINE

## 2025-03-20 PROCEDURE — 83036 HEMOGLOBIN GLYCOSYLATED A1C: CPT | Performed by: FAMILY MEDICINE

## 2025-03-20 PROCEDURE — 1159F MED LIST DOCD IN RCRD: CPT | Performed by: FAMILY MEDICINE

## 2025-03-20 PROCEDURE — 3044F HG A1C LEVEL LT 7.0%: CPT | Performed by: FAMILY MEDICINE

## 2025-03-20 PROCEDURE — 1123F ACP DISCUSS/DSCN MKR DOCD: CPT | Performed by: FAMILY MEDICINE

## 2025-03-20 PROCEDURE — 1160F RVW MEDS BY RX/DR IN RCRD: CPT | Performed by: FAMILY MEDICINE

## 2025-03-20 NOTE — PATIENT INSTRUCTIONS
think you need it. With time, your taste buds will adjust to less salt.     Eat fewer snack items, fast foods, canned soups, and other high-salt, high-fat, processed foods.     Read food labels and try to avoid saturated and trans fats. They increase your risk of heart disease by raising cholesterol levels.     Limit the amount of solid fat--butter, margarine, and shortening--you eat. Use olive, peanut, or canola oil when you cook. Bake, broil, and steam foods instead of frying them.     Eat a variety of fruit and vegetables every day. Dark green, deep orange, red, or yellow fruits and vegetables are especially good for you. Examples include spinach, carrots, peaches, and berries.     Foods high in fiber can reduce your cholesterol and provide important vitamins and minerals. High-fiber foods include whole-grain cereals and breads, oatmeal, beans, brown rice, citrus fruits, and apples.     Eat lean proteins. Heart-healthy proteins include seafood, lean meats and poultry, eggs, beans, peas, nuts, seeds, and soy products.     Limit drinks and foods with added sugar. These include candy, desserts, and soda pop.   Heart-healthy lifestyle    If your doctor recommends it, get more exercise. For many people, walking is a good choice. Or you may want to swim, bike, or do other activities. Bit by bit, increase the time you're active every day. Try for at least 30 minutes on most days of the week.     Try to quit or cut back on using tobacco and other nicotine products. This includes smoking and vaping. If you need help quitting, talk to your doctor about stop-smoking programs and medicines. These can increase your chances of quitting for good. Quitting is one of the most important things you can do to protect your heart. It is never too late to quit. Try to avoid secondhand smoke too.     Stay at a weight that's healthy for you. Talk to your doctor if you need help losing weight.     Try to get 7 to 9 hours of sleep each

## 2025-03-20 NOTE — PROGRESS NOTES
Medicare Annual Wellness Visit    Kiersten Mitchell is here for Medicare AWV    Assessment & Plan        No follow-ups on file.     Subjective     Sugars running low 100s since fall in Jan. Slipped on ice, broke radius. Seeing ortho, in splint. Exercising less since fall. 2000 metformin per day. Sees Lalo at [facility].   Hemoglobin A1C   Date Value Ref Range Status   11/29/2024 6.2 (H) 4.0 - 5.6 % Final       Patient's complete Health Risk Assessment and screening values have been reviewed and are found in Flowsheets. The following problems were reviewed today and where indicated follow up appointments were made and/or referrals ordered.    Positive Risk Factor Screenings with Interventions:    Fall Risk:  Do you feel unsteady or are you worried about falling? : (Patient-Rptd) no  2 or more falls in past year?: (Patient-Rptd) no  Fall with injury in past year?: (!) (Patient-Rptd) yes     Interventions:    Reviewed medications, home hazards, visual acuity, and co-morbidities that can increase risk for falls                   Safety:  Do you have non-slip mats or non-slip surfaces or shower bars or grab bars in your shower or bathtub?: (!) (Patient-Rptd) No    Interventions:  See AVS for additional education material                 Objective   Vitals:    03/20/25 0719   BP: 128/79   Pulse: 72   Temp: 97.7 °F (36.5 °C)   SpO2: 99%   Weight: 66.2 kg (146 lb)   Height: 1.689 m (5' 6.5\")      Body mass index is 23.21 kg/m².                    Allergies   Allergen Reactions    Seasonal      Post-nasal drip   Headaches  Ear aches     Prior to Visit Medications    Medication Sig Taking? Authorizing Provider   vitamin D (ERGOCALCIFEROL) 1.25 MG (87148 UT) CAPS capsule Take 1 capsule by mouth once a week Yes Justin Cowart PA-C   rosuvastatin (CRESTOR) 10 MG tablet Take 1 tablet by mouth daily Yes Provider, MD Freddy   ibuprofen (IBU) 800 MG tablet Take 1 tablet by mouth every 8 hours as needed for Pain Yes Vanessa

## 2025-03-20 NOTE — PROGRESS NOTES
OCCUPATIONAL THERAPY DAILY NOTE  [facility] PHYSICIANS [place] SPECIALTY CARE [facility] OCCUPATIONAL THERAPY  [address]  2ND FLOOR  [facility] [address]  Dept: [phone]  Loc: [phone]   [facility] OT Fax: [phone]      Date:  3/20/2025    Initial Evaluation Date: 2025                            Evaluating Therapist: Christine Lassiter OT     Patient Name:  Kiersten Mitchell                         :  1950     Restrictions/Precautions:  Follow appropriate protocol, low fall risk  Diagnosis:  S52.502D (ICD-10-CM) - Closed fracture of distal end of left radius with routine healing, unspecified fracture morphology, subsequent encounter  Date of Surgery/Injury: 01/15/2025     Insurance/Certification information:  [employer]- Medicare  Plan of care signed (Y/N): Electronic  Visit# / total visits: -     Referring Practitioner:  Darius Fernandez PA  Specific Practitioner Orders: OT 2-3 days a week for 6-8 weeks.  NWB JONATHANE.  Would like for OT to start in 2 weeks.     OT PLAN OF CARE   OT POC based on physician orders, patient diagnosis and results of clinical assessment     Frequency/Duration1-2x / week for 12-18 visits.   Certification period From: 2025 To: 25     GOALS (Long term same as Short term):  1) Patient will demonstrate good understanding of home program with good accuracy.   2) Patient will demonstrate increased active/passive range of motion of their LUE by atleast 10-20* for ADL/IADL completion.  3) Patient will demonstrate increased /pinch strength of at least 10 / 3-5 pinch pounds of their L hand.   4) Patient to report decreased pain in their affected L upper extremity from 8/10 to 2/10 or less with resistive functional use.   5) Increase in fine motor function as evidenced by decreased time to complete 9-hole peg test and/or MRMT test by at least 5-10 seconds with LUE in order to complete fasteners.   6) Patient to report 100%

## 2025-03-24 ENCOUNTER — TREATMENT (OUTPATIENT)
Dept: OCCUPATIONAL THERAPY | Age: 75
End: 2025-03-24
Payer: MEDICARE

## 2025-03-24 DIAGNOSIS — S52.502D CLOSED FRACTURE OF DISTAL END OF LEFT RADIUS WITH ROUTINE HEALING, UNSPECIFIED FRACTURE MORPHOLOGY, SUBSEQUENT ENCOUNTER: Primary | ICD-10-CM

## 2025-03-24 PROCEDURE — 97140 MANUAL THERAPY 1/> REGIONS: CPT | Performed by: OCCUPATIONAL THERAPIST

## 2025-03-24 PROCEDURE — 97018 PARAFFIN BATH THERAPY: CPT | Performed by: OCCUPATIONAL THERAPIST

## 2025-03-24 PROCEDURE — 97110 THERAPEUTIC EXERCISES: CPT | Performed by: OCCUPATIONAL THERAPIST

## 2025-03-24 PROCEDURE — 97035 APP MDLTY 1+ULTRASOUND EA 15: CPT | Performed by: OCCUPATIONAL THERAPIST

## 2025-03-24 NOTE — PROGRESS NOTES
OCCUPATIONAL THERAPY DAILY NOTE  [facility] PHYSICIANS [place] SPECIALTY CARE [facility] OCCUPATIONAL THERAPY  [address]  2ND FLOOR  [facility] [address]  Dept: [phone]  Loc: [phone]   [facility] OT Fax: [phone]      Date:  3/24/2025    Initial Evaluation Date: 2025                            Evaluating Therapist: Christine Lassiter OT     Patient Name:  Kiersten Mitchell                         :  1950     Restrictions/Precautions:  Follow appropriate protocol, low fall risk  Diagnosis:  S52.502D (ICD-10-CM) - Closed fracture of distal end of left radius with routine healing, unspecified fracture morphology, subsequent encounter  Date of Surgery/Injury: 01/15/2025     Insurance/Certification information:  [employer]- Medicare  Plan of care signed (Y/N): Electronic  Visit# / total visits: -18     Referring Practitioner:  Darius Fernandez PA  Specific Practitioner Orders: OT 2-3 days a week for 6-8 weeks.  NWB JONATHANE.  Would like for OT to start in 2 weeks.     OT PLAN OF CARE   OT POC based on physician orders, patient diagnosis and results of clinical assessment     Frequency/Duration1-2x / week for 12-18 visits.   Certification period From: 2025 To: 25     GOALS (Long term same as Short term):  1) Patient will demonstrate good understanding of home program with good accuracy.   2) Patient will demonstrate increased active/passive range of motion of their LUE by atleast 10-20* for ADL/IADL completion.  3) Patient will demonstrate increased /pinch strength of at least 10 / 3-5 pinch pounds of their L hand.   4) Patient to report decreased pain in their affected L upper extremity from 8/10 to 2/10 or less with resistive functional use.   5) Increase in fine motor function as evidenced by decreased time to complete 9-hole peg test and/or MRMT test by at least 5-10 seconds with LUE in order to complete fasteners.   6) Patient to report 100%

## 2025-03-26 ENCOUNTER — TREATMENT (OUTPATIENT)
Dept: OCCUPATIONAL THERAPY | Age: 75
End: 2025-03-26
Payer: MEDICARE

## 2025-03-26 DIAGNOSIS — S52.502D CLOSED FRACTURE OF DISTAL END OF LEFT RADIUS WITH ROUTINE HEALING, UNSPECIFIED FRACTURE MORPHOLOGY, SUBSEQUENT ENCOUNTER: Primary | ICD-10-CM

## 2025-03-26 PROCEDURE — 97018 PARAFFIN BATH THERAPY: CPT | Performed by: OCCUPATIONAL THERAPIST

## 2025-03-26 PROCEDURE — 97035 APP MDLTY 1+ULTRASOUND EA 15: CPT | Performed by: OCCUPATIONAL THERAPIST

## 2025-03-26 PROCEDURE — 97110 THERAPEUTIC EXERCISES: CPT | Performed by: OCCUPATIONAL THERAPIST

## 2025-03-26 PROCEDURE — 97140 MANUAL THERAPY 1/> REGIONS: CPT | Performed by: OCCUPATIONAL THERAPIST

## 2025-03-26 NOTE — PROGRESS NOTES
OCCUPATIONAL THERAPY DAILY NOTE  [facility] PHYSICIANS [place] SPECIALTY CARE [facility] OCCUPATIONAL THERAPY  [address]  2ND FLOOR  [facility] [address]  Dept: [phone]  Loc: [phone]   [facility] OT Fax: [phone]      Date:  3/26/2025    Initial Evaluation Date: 2025                            Evaluating Therapist: Christine Lassiter OT     Patient Name:  Kiersten Mitchell                         :  1950     Restrictions/Precautions:  Follow appropriate protocol, low fall risk  Diagnosis:  S52.502D (ICD-10-CM) - Closed fracture of distal end of left radius with routine healing, unspecified fracture morphology, subsequent encounter  Date of Surgery/Injury: 01/15/2025     Insurance/Certification information:  [employer]- Medicare  Plan of care signed (Y/N): Electronic  Visit# / total visits: -     Referring Practitioner:  Darius Fernandez PA  Specific Practitioner Orders: OT 2-3 days a week for 6-8 weeks.  NWB JONATHANE.  Would like for OT to start in 2 weeks.     OT PLAN OF CARE   OT POC based on physician orders, patient diagnosis and results of clinical assessment     Frequency/Duration1-2x / week for 12-18 visits.   Certification period From: 2025 To: 25     GOALS (Long term same as Short term):  1) Patient will demonstrate good understanding of home program with good accuracy.   2) Patient will demonstrate increased active/passive range of motion of their LUE by atleast 10-20* for ADL/IADL completion.  3) Patient will demonstrate increased /pinch strength of at least 10 / 3-5 pinch pounds of their L hand.   4) Patient to report decreased pain in their affected L upper extremity from 8/10 to 2/10 or less with resistive functional use.   5) Increase in fine motor function as evidenced by decreased time to complete 9-hole peg test and/or MRMT test by at least 5-10 seconds with LUE in order to complete fasteners.   6) Patient to report 100%

## 2025-04-01 ENCOUNTER — TREATMENT (OUTPATIENT)
Dept: OCCUPATIONAL THERAPY | Age: 75
End: 2025-04-01
Payer: MEDICARE

## 2025-04-01 DIAGNOSIS — S52.502D CLOSED FRACTURE OF DISTAL END OF LEFT RADIUS WITH ROUTINE HEALING, UNSPECIFIED FRACTURE MORPHOLOGY, SUBSEQUENT ENCOUNTER: Primary | ICD-10-CM

## 2025-04-01 PROCEDURE — 97140 MANUAL THERAPY 1/> REGIONS: CPT | Performed by: OCCUPATIONAL THERAPIST

## 2025-04-01 PROCEDURE — 97110 THERAPEUTIC EXERCISES: CPT | Performed by: OCCUPATIONAL THERAPIST

## 2025-04-01 NOTE — PROGRESS NOTES
compliance with their splint wear, care, and precautions if needed.   7) Patient will be knowledgeable of edema control techniques as evident with decreases from moderate to mild/none.   8) Patient will report ADL functions as Mod I/I using LUE.   9) Patient will demonstrate improved functional activity tolerance from fair - to good for ADL/IADL completion.  10) Patient will decrease QuickDASH score to 30% or less for increased participation in daily functional activities.      TODAY'S TREATMENT     Pain Level: Dull ache around the wrist    Subjective:  Pt reports she was able to make the bed the other day.     Objective:    Updated POC to be completed by 10 th.  INTERVENTION: COMPLETED: SPECIFICS/COMMENTS:   Modality:     US  Completed for decreasing nerve pain & edema and increasing healing/blood flow to the area ( volar wrist across Flexor tendon zone 5 and along CMC area  Depth: 3MHZ  Intensity: 1.0  Duration: 8 min  Duty Cycle: 100%   Fluido  10 mins to LUE with AROM program completed while monitored to decrease stiffness/pain and increase soft tissue elasticity   MHP x 10 mins to LUE to decrease stiffness and pain and increasing soft tissue elasticity.    Paraffin + MHP  10 mins to LUE for increasing soft tissue elasticity and decreasing stiffness/pain.    AROM:     L wrist x Wrist AROM in all planes x10 + HEP  - Forearm sup/pron x10 + HEP  - true balance x3 mins  - Jux-A-Cizer x3 mins full arm, 2 mins elbow on table   L digits/Thumb x Towel scrunches x10   - In hand translation and dexterity task with forearm rotation.   - functional motion tasks with fine motor component of the wrist & digits.   Thumb opposition to base of 5th digit x10, thumb circumduction, radial and palmar abduction x10 + HEP  - Boading balls x10 in a sup position +HEP  - Web space resistance sponge full fist place and holds x10   Manipulation of large dice    AAROM:     LUE  Ball AAROM x15 each wrist flexion, extension, RD/UD, sup/pron,

## 2025-04-03 ENCOUNTER — TREATMENT (OUTPATIENT)
Dept: OCCUPATIONAL THERAPY | Age: 75
End: 2025-04-03
Payer: MEDICARE

## 2025-04-03 DIAGNOSIS — S52.502D CLOSED FRACTURE OF DISTAL END OF LEFT RADIUS WITH ROUTINE HEALING, UNSPECIFIED FRACTURE MORPHOLOGY, SUBSEQUENT ENCOUNTER: Primary | ICD-10-CM

## 2025-04-03 PROCEDURE — 97140 MANUAL THERAPY 1/> REGIONS: CPT | Performed by: OCCUPATIONAL THERAPIST

## 2025-04-03 PROCEDURE — 97110 THERAPEUTIC EXERCISES: CPT | Performed by: OCCUPATIONAL THERAPIST

## 2025-04-03 NOTE — PROGRESS NOTES
OCCUPATIONAL THERAPY DAILY NOTE  [facility] PHYSICIANS [place] SPECIALTY CARE [facility] OCCUPATIONAL THERAPY  [address]  2ND FLOOR  [facility] [address]  Dept: [phone]  Loc: [phone]   [facility] OT Fax: [phone]      Date:  4/3/2025    Initial Evaluation Date: 2025                            Evaluating Therapist: Christine Lassiter OT     Patient Name:  Kiersten Mitchell                         :  1950     Restrictions/Precautions:  Follow appropriate protocol, low fall risk  Diagnosis:  S52.502D (ICD-10-CM) - Closed fracture of distal end of left radius with routine healing, unspecified fracture morphology, subsequent encounter  Date of Surgery/Injury: 01/15/2025 ( 11 weeks)     Insurance/Certification information:  [facility] Medicare  Plan of care signed (Y/N): Electronic  Visit# / total visits: -     Referring Practitioner:  Darius Fernandez PA  Specific Practitioner Orders: OT 2-3 days a week for 6-8 weeks.  NWB LUE.  Would like for OT to start in 2 weeks.     OT PLAN OF CARE   OT POC based on physician orders, patient diagnosis and results of clinical assessment     Frequency/Duration1-2x / week for 12-18 visits.   Certification period From: 2025 To: 25     GOALS (Long term same as Short term):  1) Patient will demonstrate good understanding of home program with good accuracy.   2) Patient will demonstrate increased active/passive range of motion of their LUE by atleast 10-20* for ADL/IADL completion.  3) Patient will demonstrate increased /pinch strength of at least 10 / 3-5 pinch pounds of their L hand.   4) Patient to report decreased pain in their affected L upper extremity from 8/10 to 2/10 or less with resistive functional use.   5) Increase in fine motor function as evidenced by decreased time to complete 9-hole peg test and/or MRMT test by at least 5-10 seconds with LUE in order to complete fasteners.   6) Patient to

## 2025-04-08 ENCOUNTER — TREATMENT (OUTPATIENT)
Dept: OCCUPATIONAL THERAPY | Age: 75
End: 2025-04-08
Payer: MEDICARE

## 2025-04-08 DIAGNOSIS — S52.502D CLOSED FRACTURE OF DISTAL END OF LEFT RADIUS WITH ROUTINE HEALING, UNSPECIFIED FRACTURE MORPHOLOGY, SUBSEQUENT ENCOUNTER: Primary | ICD-10-CM

## 2025-04-08 PROCEDURE — 97110 THERAPEUTIC EXERCISES: CPT | Performed by: OCCUPATIONAL THERAPIST

## 2025-04-08 PROCEDURE — 97140 MANUAL THERAPY 1/> REGIONS: CPT | Performed by: OCCUPATIONAL THERAPIST

## 2025-04-08 NOTE — PROGRESS NOTES
OCCUPATIONAL THERAPY DAILY NOTE  [facility] PHYSICIANS [place] SPECIALTY CARE [facility] OCCUPATIONAL THERAPY  [address]  2ND FLOOR  [facility] [address]  Dept: [phone]  Loc: [phone]   [facility] OT Fax: [phone]      Date:  2025    Initial Evaluation Date: 2025                            Evaluating Therapist: Christine Lassiter OT     Patient Name:  Kiersten Mitchell                         :  1950     Restrictions/Precautions:  Follow appropriate protocol, low fall risk  Diagnosis:  S52.502D (ICD-10-CM) - Closed fracture of distal end of left radius with routine healing, unspecified fracture morphology, subsequent encounter  Date of Surgery/Injury: 01/15/2025 ( 11 weeks)     Insurance/Certification information:  [facility] Medicare  Plan of care signed (Y/N): Electronic  Visit# / total visits: 10 / 12-     Referring Practitioner:  Darius Fernandez PA  Specific Practitioner Orders: OT 2-3 days a week for 6-8 weeks.  NWB LUE.  Would like for OT to start in 2 weeks.     OT PLAN OF CARE   OT POC based on physician orders, patient diagnosis and results of clinical assessment     Frequency/Duration1-2x / week for 12-18 visits.   Certification period From: 2025 To: 25     GOALS (Long term same as Short term):  1) Patient will demonstrate good understanding of home program with good accuracy.   2) Patient will demonstrate increased active/passive range of motion of their LUE by atleast 10-20* for ADL/IADL completion.  3) Patient will demonstrate increased /pinch strength of at least 10 / 3-5 pinch pounds of their L hand.   4) Patient to report decreased pain in their affected L upper extremity from 8/10 to 2/10 or less with resistive functional use.   5) Increase in fine motor function as evidenced by decreased time to complete 9-hole peg test and/or MRMT test by at least 5-10 seconds with LUE in order to complete fasteners.   6) Patient to

## 2025-04-10 ENCOUNTER — TREATMENT (OUTPATIENT)
Dept: OCCUPATIONAL THERAPY | Age: 75
End: 2025-04-10
Payer: MEDICARE

## 2025-04-10 DIAGNOSIS — S52.502D CLOSED FRACTURE OF DISTAL END OF LEFT RADIUS WITH ROUTINE HEALING, UNSPECIFIED FRACTURE MORPHOLOGY, SUBSEQUENT ENCOUNTER: Primary | ICD-10-CM

## 2025-04-10 PROCEDURE — 97110 THERAPEUTIC EXERCISES: CPT | Performed by: OCCUPATIONAL THERAPIST

## 2025-04-10 PROCEDURE — 97530 THERAPEUTIC ACTIVITIES: CPT | Performed by: OCCUPATIONAL THERAPIST

## 2025-04-10 NOTE — PROGRESS NOTES
Therapeutic Activity 10 1   07949 Neuromuscular Re-Ed     16302 Manual Therapy     27947 ADL/COMP Tech Train     53220 Orthotic Management/Training      Other                 Total  40 3     Plan: OT 1-2x/week for 12-18 sessions    [x]  Continues Plan of care with focus on improve functional use of the LUE independently by increasing strength, endurance AROM, fine motor coordination and decreasing edema & pain: Treatment covered based on POC and graduated to patient's progress. Pt education continues at each visit to obtain maximum benefits from skilled OT intervention.  []  Alter Plan of care:   []  Discharge:      Christine Lassiter OT R/L, MS #[id number]

## 2025-04-14 ENCOUNTER — TREATMENT (OUTPATIENT)
Dept: OCCUPATIONAL THERAPY | Age: 75
End: 2025-04-14
Payer: MEDICARE

## 2025-04-14 DIAGNOSIS — S52.502D CLOSED FRACTURE OF DISTAL END OF LEFT RADIUS WITH ROUTINE HEALING, UNSPECIFIED FRACTURE MORPHOLOGY, SUBSEQUENT ENCOUNTER: Primary | ICD-10-CM

## 2025-04-14 PROCEDURE — 97140 MANUAL THERAPY 1/> REGIONS: CPT | Performed by: OCCUPATIONAL THERAPIST

## 2025-04-14 PROCEDURE — 97530 THERAPEUTIC ACTIVITIES: CPT | Performed by: OCCUPATIONAL THERAPIST

## 2025-04-14 PROCEDURE — 97110 THERAPEUTIC EXERCISES: CPT | Performed by: OCCUPATIONAL THERAPIST

## 2025-04-14 NOTE — PROGRESS NOTES
OCCUPATIONAL THERAPY DAILY NOTE  [facility] PHYSICIANS [place] [facility] OCCUPATIONAL THERAPY  [address]  2ND FLOOR  [facility] [address]  Dept: [phone]  Loc: [phone]   [facility] OT Fax: [phone]      Date:  2025    Initial Evaluation Date: 2025                            Evaluating Therapist: Christine Lassiter OT     Patient Name:  Kiersten Mitchell                         :  1950     Restrictions/Precautions:  Follow appropriate protocol, low fall risk  Diagnosis:  S52.502D (ICD-10-CM) - Closed fracture of distal end of left radius with routine healing, unspecified fracture morphology, subsequent encounter  Date of Surgery/Injury: 01/15/2025 ( 12 weeks)     Insurance/Certification information:  [facility] Medicare  Plan of care signed (Y/N): Electronic  Visit# / total visits: -18     Referring Practitioner:  Darius Fernandez PA  Specific Practitioner Orders: OT 2-3 days a week for 6-8 weeks.  NWB LUE.  Would like for OT to start in 2 weeks.     OT PLAN OF CARE   OT POC based on physician orders, patient diagnosis and results of clinical assessment     Frequency/Duration1-2x / week for 12-18 visits.   Certification period From: 2025 To: 25     GOALS (Long term same as Short term):  1) Patient will demonstrate good understanding of home program with good accuracy.   Progressing, Pt demo's fair understanding of all HEP   2) Patient will demonstrate increased active/passive range of motion of their LUE by atleast 10-20* for ADL/IADL completion.  Progressing, Pt demo's improvement of atleast 10-15* in the wrist. Continued focus on optimal functional motion going forward  3) Patient will demonstrate increased /pinch strength of at least 10 / 3-5 pinch pounds of their L hand.   Assessed this date with significant limitations with LUE as well as pain with resistance  4) Patient to report decreased pain in their

## 2025-04-15 ENCOUNTER — OFFICE VISIT (OUTPATIENT)
Dept: ORTHOPEDIC SURGERY | Age: 75
End: 2025-04-15
Payer: MEDICARE

## 2025-04-15 VITALS
DIASTOLIC BLOOD PRESSURE: 81 MMHG | WEIGHT: 145 LBS | HEIGHT: 67 IN | RESPIRATION RATE: 18 BRPM | HEART RATE: 80 BPM | SYSTOLIC BLOOD PRESSURE: 126 MMHG | BODY MASS INDEX: 22.76 KG/M2 | TEMPERATURE: 98.7 F | OXYGEN SATURATION: 98 %

## 2025-04-15 DIAGNOSIS — G56.02 LEFT CARPAL TUNNEL SYNDROME: ICD-10-CM

## 2025-04-15 DIAGNOSIS — S52.592D OTHER CLOSED FRACTURE OF DISTAL END OF LEFT RADIUS WITH ROUTINE HEALING, SUBSEQUENT ENCOUNTER: Primary | ICD-10-CM

## 2025-04-15 DIAGNOSIS — M65.312 TRIGGER FINGER OF LEFT THUMB: ICD-10-CM

## 2025-04-15 PROCEDURE — 1159F MED LIST DOCD IN RCRD: CPT | Performed by: ORTHOPAEDIC SURGERY

## 2025-04-15 PROCEDURE — 1123F ACP DISCUSS/DSCN MKR DOCD: CPT | Performed by: ORTHOPAEDIC SURGERY

## 2025-04-15 PROCEDURE — 20550 NJX 1 TENDON SHEATH/LIGAMENT: CPT | Performed by: ORTHOPAEDIC SURGERY

## 2025-04-15 PROCEDURE — 99213 OFFICE O/P EST LOW 20 MIN: CPT | Performed by: ORTHOPAEDIC SURGERY

## 2025-04-15 RX ORDER — BETAMETHASONE SODIUM PHOSPHATE AND BETAMETHASONE ACETATE 3; 3 MG/ML; MG/ML
0.5 INJECTION, SUSPENSION INTRA-ARTICULAR; INTRALESIONAL; INTRAMUSCULAR; SOFT TISSUE ONCE
Status: COMPLETED | OUTPATIENT
Start: 2025-04-15 | End: 2025-04-15

## 2025-04-15 RX ADMIN — BETAMETHASONE SODIUM PHOSPHATE AND BETAMETHASONE ACETATE 0.48 MG: 3; 3 INJECTION, SUSPENSION INTRA-ARTICULAR; INTRALESIONAL; INTRAMUSCULAR; SOFT TISSUE at 14:37

## 2025-04-15 RX ADMIN — Medication 0.5 ML: at 14:37

## 2025-04-15 NOTE — PROGRESS NOTES
Chief Complaint   Patient presents with    Wrist Injury     FU Left Distal Radius Fracture DOI 1/15/2025    Trigger finger     Left trigger thumb.  OT Lizabeth put her in thumb brace 2 weeks ago.  She has noticed no change in discomfort.  Hx of Rt trigger thumb release approx 1 1/2 years ago at [facility] (Dr Pathak).        SUBJECTIVE: Patient is a very pleasant 74-year-old female who is approxi-4 months out from a nonoperative left distal radius fracture.  This is her nondominant arm.  She also has developed a left trigger thumb and recurrent symptoms of carpal tunnel along the course of her recovery.  She is working on range of motion with occupational therapy currently.  She is happy with the results and making significant gains.  She is still complains of paresthesias in the distribution of her median nerve.  She also is currently in a splint by OT for triggering of her left thumb.  She has a significant history of a right trigger thumb release approximately year and a half ago in [facility] which she really likes the hand surgeon there.  She has been recommended get an injection of her trigger thumb today and workup for potential carpal tunnel as well as x-rays.  Her x-ray showed significantly more healing of her distal radius fracture appears completely healed at this point in time.  She is having more symptoms related to carpal tunnel syndrome and the trigger thumb.  I talked her in detail about injection explained the risk of infection and recurrence of triggering or if not helping the trigger thumb and she understands would like to proceed.          Past Medical History:   Diagnosis Date    Actinic keratosis     Diabetes (HCC)     Early dry stage nonexudative age-related macular degeneration     Hearing loss     Hyperlipidemia     Hypothyroidism     Lichen sclerosus     Neuropathy     Prolonged emergence from general anesthesia     Rotator cuff impingement syndrome     Spinal arthritis     Spinal stenosis     Trigger

## 2025-04-17 DIAGNOSIS — Z78.0 POSTMENOPAUSAL: Primary | ICD-10-CM

## 2025-04-22 ENCOUNTER — TREATMENT (OUTPATIENT)
Dept: OCCUPATIONAL THERAPY | Age: 75
End: 2025-04-22
Payer: MEDICARE

## 2025-04-22 DIAGNOSIS — S52.502D CLOSED FRACTURE OF DISTAL END OF LEFT RADIUS WITH ROUTINE HEALING, UNSPECIFIED FRACTURE MORPHOLOGY, SUBSEQUENT ENCOUNTER: Primary | ICD-10-CM

## 2025-04-22 PROCEDURE — 97110 THERAPEUTIC EXERCISES: CPT | Performed by: OCCUPATIONAL THERAPIST

## 2025-04-22 PROCEDURE — 97140 MANUAL THERAPY 1/> REGIONS: CPT | Performed by: OCCUPATIONAL THERAPIST

## 2025-04-22 NOTE — PROGRESS NOTES
OCCUPATIONAL THERAPY DAILY NOTE  [facility] PHYSICIANS [place] SPECIALTY CARE [facility] OCCUPATIONAL THERAPY  [address]  2ND FLOOR  [facility] [address]  Dept: [phone]  Loc: [phone]   [facility] OT Fax: [phone]      Date:  2025    Initial Evaluation Date: 2025                            Evaluating Therapist: Christine Lassiter OT     Patient Name:  Kiersten Mitchell                         :  1950     Restrictions/Precautions:  Follow appropriate protocol, low fall risk  Diagnosis:  S52.502D (ICD-10-CM) - Closed fracture of distal end of left radius with routine healing, unspecified fracture morphology, subsequent encounter  Date of Surgery/Injury: 01/15/2025 ( 12 weeks)     Insurance/Certification information:  [employer]- Medicare  Plan of care signed (Y/N): Electronic  Visit# / total visits: -     Referring Practitioner:  Darius Fernandez PA  Specific Practitioner Orders: OT 2-3 days a week for 6-8 weeks.  NWB LUE.  Would like for OT to start in 2 weeks.     OT PLAN OF CARE   OT POC based on physician orders, patient diagnosis and results of clinical assessment     Frequency/Duration1-2x / week for 12-18 visits.   Certification period From: 2025 To: 25     GOALS (Long term same as Short term):  1) Patient will demonstrate good understanding of home program with good accuracy.   Progressing, Pt demo's fair understanding of all HEP   2) Patient will demonstrate increased active/passive range of motion of their LUE by atleast 10-20* for ADL/IADL completion.  Progressing, Pt demo's improvement of atleast 10-15* in the wrist. Continued focus on optimal functional motion going forward  3) Patient will demonstrate increased /pinch strength of at least 10 / 3-5 pinch pounds of their L hand.   Assessed this date with significant limitations with LUE as well as pain with resistance  4) Patient to report decreased pain in their

## 2025-04-23 ENCOUNTER — TREATMENT (OUTPATIENT)
Dept: OCCUPATIONAL THERAPY | Age: 75
End: 2025-04-23
Payer: MEDICARE

## 2025-04-23 DIAGNOSIS — S52.502D CLOSED FRACTURE OF DISTAL END OF LEFT RADIUS WITH ROUTINE HEALING, UNSPECIFIED FRACTURE MORPHOLOGY, SUBSEQUENT ENCOUNTER: Primary | ICD-10-CM

## 2025-04-23 PROCEDURE — 97110 THERAPEUTIC EXERCISES: CPT | Performed by: OCCUPATIONAL THERAPIST

## 2025-04-23 PROCEDURE — 97140 MANUAL THERAPY 1/> REGIONS: CPT | Performed by: OCCUPATIONAL THERAPIST

## 2025-04-23 NOTE — PROGRESS NOTES
OCCUPATIONAL THERAPY DAILY NOTE  [facility] PHYSICIANS [place] SPECIAL[facility]ARE [facility] OCCUPATIONAL THERAPY  [address]  2ND FLOOR  [facility] [address]  Dept: [phone]  Loc: [phone]   [facility] OT Fax: [phone]      Date:  2025    Initial Evaluation Date: 2025                            Evaluating Therapist: Christine Lassiter OT     Patient Name:  Kiersten Mitchell                         :  1950     Restrictions/Precautions:  Follow appropriate protocol, low fall risk  Diagnosis:  S52.502D (ICD-10-CM) - Closed fracture of distal end of left radius with routine healing, unspecified fracture morphology, subsequent encounter  Date of Surgery/Injury: 01/15/2025 ( 12 weeks)     Insurance/Certification information:  [facility] Medicare  Plan of care signed (Y/N): Electronic  Visit# / total visits: -18     Referring Practitioner:  Darius Fernandez PA  Specific Practitioner Orders: OT 2-3 days a week for 6-8 weeks.  NWB LUE.  Would like for OT to start in 2 weeks.     OT PLAN OF CARE   OT POC based on physician orders, patient diagnosis and results of clinical assessment     Frequency/Duration1-2x / week for 12-18 visits.   Certification period From: 2025 To: 25     GOALS (Long term same as Short term):  1) Patient will demonstrate good understanding of home program with good accuracy.   Progressing, Pt demo's fair understanding of all HEP   2) Patient will demonstrate increased active/passive range of motion of their LUE by atleast 10-20* for ADL/IADL completion.  Progressing, Pt demo's improvement of atleast 10-15* in the wrist. Continued focus on optimal functional motion going forward  3) Patient will demonstrate increased /pinch strength of at least 10 / 3-5 pinch pounds of their L hand.   Assessed this date with significant limitations with LUE as well as pain with resistance  4) Patient to report decreased pain in their

## 2025-04-25 ENCOUNTER — HOSPITAL ENCOUNTER (OUTPATIENT)
Dept: MAMMOGRAPHY | Age: 75
Discharge: HOME | End: 2025-04-27
Attending: FAMILY MEDICINE
Payer: MEDICARE

## 2025-04-25 DIAGNOSIS — Z78.0 POSTMENOPAUSAL: ICD-10-CM

## 2025-04-25 PROCEDURE — 77080 DXA BONE DENSITY AXIAL: CPT

## 2025-04-28 ENCOUNTER — RESULTS FOLLOW-UP (OUTPATIENT)
Dept: FAMILY MEDICINE CLINIC | Age: 75
End: 2025-04-28

## 2025-04-28 NOTE — RESULT ENCOUNTER NOTE
50,000 units was change by ortho due to break on the left wrist back in January. Pt says she is in rehab right now and haven't been release to do upper body but goes to [employer] to ride bike. As soon as she's release from OT she will ride out door due to trusting of hand to pull the break.

## 2025-04-29 ENCOUNTER — TREATMENT (OUTPATIENT)
Dept: OCCUPATIONAL THERAPY | Age: 75
End: 2025-04-29
Payer: MEDICARE

## 2025-04-29 DIAGNOSIS — S52.502D CLOSED FRACTURE OF DISTAL END OF LEFT RADIUS WITH ROUTINE HEALING, UNSPECIFIED FRACTURE MORPHOLOGY, SUBSEQUENT ENCOUNTER: Primary | ICD-10-CM

## 2025-04-29 PROCEDURE — 97140 MANUAL THERAPY 1/> REGIONS: CPT | Performed by: OCCUPATIONAL THERAPIST

## 2025-04-29 PROCEDURE — 97110 THERAPEUTIC EXERCISES: CPT | Performed by: OCCUPATIONAL THERAPIST

## 2025-05-01 ENCOUNTER — TREATMENT (OUTPATIENT)
Dept: OCCUPATIONAL THERAPY | Age: 75
End: 2025-05-01
Payer: MEDICARE

## 2025-05-01 DIAGNOSIS — S52.502D CLOSED FRACTURE OF DISTAL END OF LEFT RADIUS WITH ROUTINE HEALING, UNSPECIFIED FRACTURE MORPHOLOGY, SUBSEQUENT ENCOUNTER: Primary | ICD-10-CM

## 2025-05-01 PROCEDURE — 97035 APP MDLTY 1+ULTRASOUND EA 15: CPT | Performed by: OCCUPATIONAL THERAPIST

## 2025-05-01 PROCEDURE — 97110 THERAPEUTIC EXERCISES: CPT | Performed by: OCCUPATIONAL THERAPIST

## 2025-05-01 PROCEDURE — 97140 MANUAL THERAPY 1/> REGIONS: CPT | Performed by: OCCUPATIONAL THERAPIST

## 2025-05-01 NOTE — PROGRESS NOTES
decrease stiffness and pain and increasing soft tissue elasticity.    Paraffin + MHP  10 mins to LUE for increasing soft tissue elasticity and decreasing stiffness/pain.    AROM:     L wrist x Wrist AROM in all planes x10 + HEP  - Forearm sup/pron x10 + HEP  - Ring sup/pron activity to facilitate forearm motion  - true balance x3 mins  - Wrist proprioception activity with isolated motion  - Jux-A-Cizer x3 mins elbow on table   L digits/Thumb x Towel scrunches x10   - In hand translation and dexterity task with forearm rotation & utilizing tweezers to focus on 3 point pinch  - Tendon glides x6 + HEP  - full grasp pattern bean transfer with focus on increased .   - functional motion tasks with fine motor component of the wrist & digits.   Thumb opposition to base of 5th digit x10, thumb circumduction, radial and palmar abduction x10 + HEP  - Boading balls x10^x20 ( mod difficulty)clockwise & counter  in a sup position +HEP  - Web space resistance sponge full fist place and holds x10   Manipulation of large dice         LUE  Ball AAROM x10 each wrist flexion, extension, RD/UD, sup/pron, A-Z        PROM/Stretching:     LUE x Wrist all planes gently   Prayer stretches x10 + HEP  - Extrinsic stretching flexors/extensors x5 each   - full fist place and holds x5  - Median nerve glides x5        Scar Mass/Edema Control:     LUE x Manual edema massage completed to decrease edema and scar tissue massage        Strengthening:     LUE x Grasping/manipulation of mod resistance block 2x10 + HEP ( 2x/day)   -  light theraputty grasp, flattening & pinching out objects opposition.   - 6# therabar sup, pron, twisting, wrist flexion, wrist extension x10         Other:     LUE  Desentization - rubbing/tapping  added to HEP  - Program issued using hard and soft textures 35 secs each up to 3 mins along the thumb for desensitization   HEP x Throughout Session with instructions and handouts as needed re-education on initial handouts

## 2025-05-06 ENCOUNTER — TREATMENT (OUTPATIENT)
Dept: OCCUPATIONAL THERAPY | Age: 75
End: 2025-05-06
Payer: MEDICARE

## 2025-05-06 DIAGNOSIS — S52.502D CLOSED FRACTURE OF DISTAL END OF LEFT RADIUS WITH ROUTINE HEALING, UNSPECIFIED FRACTURE MORPHOLOGY, SUBSEQUENT ENCOUNTER: Primary | ICD-10-CM

## 2025-05-06 PROCEDURE — 97110 THERAPEUTIC EXERCISES: CPT | Performed by: OCCUPATIONAL THERAPIST

## 2025-05-06 PROCEDURE — 97035 APP MDLTY 1+ULTRASOUND EA 15: CPT | Performed by: OCCUPATIONAL THERAPIST

## 2025-05-06 NOTE — PROGRESS NOTES
OCCUPATIONAL THERAPY DAILY NOTE  [facility] PHYSICIANS [place] SPECIALTY CARE [facility] OCCUPATIONAL THERAPY  [address]  2ND FLOOR  [facility] [address]  Dept: [phone]  Loc: [phone]   [facility] OT Fax: [phone]      Date:  2025    Initial Evaluation Date: 2025                            Evaluating Therapist: Christine Lassiter OT     Patient Name:  Kiersten Mitchell                         :  1950     Restrictions/Precautions:  Follow appropriate protocol, low fall risk  Diagnosis:  S52.502D (ICD-10-CM) - Closed fracture of distal end of left radius with routine healing, unspecified fracture morphology, subsequent encounter  Date of Surgery/Injury: 01/15/2025 ( 12 weeks)     Insurance/Certification information:  [facility] Medicare  Plan of care signed (Y/N): Electronic  Visit# / total visits: -18     Referring Practitioner:  Darius Fernandez PA  Specific Practitioner Orders: OT 2-3 days a week for 6-8 weeks.  NWB LUE.  Would like for OT to start in 2 weeks.     OT PLAN OF CARE   OT POC based on physician orders, patient diagnosis and results of clinical assessment     Frequency/Duration1-2x / week for 12-18 visits.   Certification period From: 2025 To: 25     GOALS (Long term same as Short term):  1) Patient will demonstrate good understanding of home program with good accuracy.   Progressing, Pt demo's fair understanding of all HEP   2) Patient will demonstrate increased active/passive range of motion of their LUE by atleast 10-20* for ADL/IADL completion.  Progressing, Pt demo's improvement of atleast 10-15* in the wrist. Continued focus on optimal functional motion going forward  3) Patient will demonstrate increased /pinch strength of at least 10 / 3-5 pinch pounds of their L hand.   Assessed this date with significant limitations with LUE as well as pain with resistance  4) Patient to report decreased pain in their affected

## 2025-05-08 ENCOUNTER — TREATMENT (OUTPATIENT)
Dept: OCCUPATIONAL THERAPY | Age: 75
End: 2025-05-08

## 2025-05-08 DIAGNOSIS — S52.502D CLOSED FRACTURE OF DISTAL END OF LEFT RADIUS WITH ROUTINE HEALING, UNSPECIFIED FRACTURE MORPHOLOGY, SUBSEQUENT ENCOUNTER: Primary | ICD-10-CM

## 2025-05-08 NOTE — PROGRESS NOTES
rubbing/tapping  added to HEP  - Program issued using hard and soft textures 35 secs each up to 3 mins along the thumb for desensitization   HEP x Throughout Session with instructions and handouts as needed re-education on initial handouts including additions from today     Assessment/Comments: Pt tolerated session fair with re-assessment completed. Pt continues to report difficulty with opening jars still, Pt still reports difficulty with anything grasping/pulling, and pain with carrying a grocery bag, Picking up  her purse, Difficulty holding heavier objects such as a metal water bottle when going to the gym, difficulty with fine motor pinching/pulling like pulling up a sheet. Pt has functional wrist ROM however continues to demo pain with resistance & weight bearing of the LUE as well as not a complete full power fist. Pt reports improvement with function however demos continues deficits with pain, edema at the volar wrist and continues with functional limitations. Recommend focus on functional resistance/strengthening for 1-2x/week for 8 additional sessions to improve functional resistive use and decrease pain during resistive use.     L Upper Extremity ROM       AROM [x]     PROM[] IE  04/10  5/8        FOREARM Pronation 80-90* 60*  75* 75*        Supination 80-90* 45*  85* 85*         WRIST Flexion 0-80* 30*  50*  55*       Extension 0-70* 25*  45*  50*       Radial Deviation 0-20* 10*  10*  10*       Ulnar Deviation 0-30* 15*  20* 35*         Comment: Hand Dominance is R     Edema Description/Circumferential Measurements:              L figure 8: 44 cm, 42.5 cm              R figure 8: 42.5 cm     Dynamometer (setting 2) IE  5/8   Left 15#  5/10 pain 20#   5/10 pain   Right 50#     Pinch (lateral)       Left 8#  4/10  9#  3/10   Right 12#     Pinch (tripod)       Left 5#   3/10 pain  6#  3-4/10   Right    10#        9 Hole Peg test IE  04/10 5/8   Left 25 sec  22 sec 21 sec   Right 20 sec           Ilia

## 2025-05-13 ENCOUNTER — TREATMENT (OUTPATIENT)
Dept: OCCUPATIONAL THERAPY | Age: 75
End: 2025-05-13
Payer: MEDICARE

## 2025-05-13 DIAGNOSIS — S52.502D CLOSED FRACTURE OF DISTAL END OF LEFT RADIUS WITH ROUTINE HEALING, UNSPECIFIED FRACTURE MORPHOLOGY, SUBSEQUENT ENCOUNTER: Primary | ICD-10-CM

## 2025-05-13 PROCEDURE — 97035 APP MDLTY 1+ULTRASOUND EA 15: CPT | Performed by: OCCUPATIONAL THERAPIST

## 2025-05-13 PROCEDURE — 97110 THERAPEUTIC EXERCISES: CPT | Performed by: OCCUPATIONAL THERAPIST

## 2025-05-13 NOTE — PROGRESS NOTES
100%   Fluido  10 mins to LUE with AROM program completed while monitored to decrease stiffness/pain and increase soft tissue elasticity   MHP x 10 mins to LUE to decrease stiffness and pain and increasing soft tissue elasticity.    Paraffin + MHP  10 mins to LUE for increasing soft tissue elasticity and decreasing stiffness/pain.    AROM:     L wrist  Wrist AROM in all planes x10 + HEP  - Forearm sup/pron x10 + HEP  - Ring sup/pron activity to facilitate forearm motion  - true balance x3 mins  - Wrist proprioception activity with isolated motion  - Jux-A-Cizer x3 mins elbow on table   L digits/Thumb  Towel scrunches x10   - In hand translation and dexterity task with forearm rotation & utilizing tweezers to focus on 3 point pinch  - Tendon glides x6 + HEP  - full grasp pattern bean transfer with focus on increased .   - functional motion tasks with fine motor component of the wrist & digits. ( Ball pushes)  Thumb opposition to base of 5th digit x10, thumb circumduction, radial and palmar abduction x10 + HEP  - Boading balls x10^x20 ( mod difficulty)clockwise & counter  in a sup position +HEP  - Web space resistance sponge full fist place and holds x10   Manipulation of large dice         LUE X Ball AAROM  weighted ball x10 each wrist flexion, extension, RD/UD, sup/pron, A-Z        PROM/Stretching:     LUE x Wrist all planes gently   Prayer stretches x10 + HEP  - Finger flexion/extension stretching of 3rd digit +HEP with cues for proper completion.   - Extrinsic stretching flexors/extensors x5 each   - full fist place and holds x5  - Median nerve glides x5        Scar Mass/Edema Control:     LUE  Manual edema massage completed to decrease edema and scar tissue massage        Strengthening:     LUE x Grasping/manipulation of mod resistance block 2x10 + HEP ( 2x/day)   -  light theraputty grasp, flattening & pinching out objects opposition.   -1# free weight wrist/forearm all planes x15  - 15# calibrated hand

## 2025-05-14 ENCOUNTER — TREATMENT (OUTPATIENT)
Dept: OCCUPATIONAL THERAPY | Age: 75
End: 2025-05-14
Payer: MEDICARE

## 2025-05-14 DIAGNOSIS — S52.502D CLOSED FRACTURE OF DISTAL END OF LEFT RADIUS WITH ROUTINE HEALING, UNSPECIFIED FRACTURE MORPHOLOGY, SUBSEQUENT ENCOUNTER: Primary | ICD-10-CM

## 2025-05-14 PROCEDURE — 97035 APP MDLTY 1+ULTRASOUND EA 15: CPT | Performed by: OCCUPATIONAL THERAPIST

## 2025-05-14 PROCEDURE — 97110 THERAPEUTIC EXERCISES: CPT | Performed by: OCCUPATIONAL THERAPIST

## 2025-05-14 NOTE — PROGRESS NOTES
increasing strength, endurance AROM, fine motor coordination and decreasing edema & pain: Treatment covered based on POC and graduated to patient's progress. Pt education continues at each visit to obtain maximum benefits from skilled OT intervention.  [x]  Alter Plan of care: Recommend additional 1-2x/week for 8 visits to improve functional resistance with decreased pain.   []  Discharge:      Christine Lassiter OT R/L, MS #[id number]

## 2025-05-15 ENCOUNTER — HOSPITAL ENCOUNTER (OUTPATIENT)
Dept: NEUROLOGY | Age: 75
Discharge: HOME | End: 2025-05-15
Payer: MEDICARE

## 2025-05-15 VITALS — WEIGHT: 145 LBS | HEIGHT: 67 IN | BODY MASS INDEX: 22.76 KG/M2

## 2025-05-15 DIAGNOSIS — G56.02 LEFT CARPAL TUNNEL SYNDROME: ICD-10-CM

## 2025-05-15 DIAGNOSIS — S52.592D OTHER CLOSED FRACTURE OF DISTAL END OF LEFT RADIUS WITH ROUTINE HEALING, SUBSEQUENT ENCOUNTER: ICD-10-CM

## 2025-05-15 PROBLEM — S52.502D CLOSED FRACTURE OF LOWER END OF LEFT RADIUS WITH ROUTINE HEALING: Status: ACTIVE | Noted: 2025-05-15

## 2025-05-15 PROCEDURE — 95886 MUSC TEST DONE W/N TEST COMP: CPT

## 2025-05-15 PROCEDURE — 95911 NRV CNDJ TEST 9-10 STUDIES: CPT

## 2025-05-15 NOTE — PROCEDURES
[facility] [place]  Electrodiagnostic Laboratory  [place]        Full Name: Kiersten Mitchell Gender: Female  MRN: [id number] YOB: 1950  Location: Outpt.      Visit Date: 5/15/2025 08:19  Age: 74 Years 5 Months Old  Examining Physician: Dr. Helms  Referring Physician: Dr. Prakash  Technician: Carole Ramos   Height: 5 feet 6 inch  Weight: 145 lbs  BMI: 23.1  Notes: Closed fx. distal end of left radius w/routine healing; Left CTS        Motor NCS      Nerve / Sites Lat. Amplitude Amp.1-2 Distance Lat Diff Velocity Temp.    ms mV % cm ms m/s °C   L Median - APB      Wrist 5.47 3.9 100 8   32.1      Elbow 9.48 3.6 92.5 20 4.01 50 32.2   R Median - APB      Wrist 4.43 8.7 100 8   32      Elbow 8.49 8.6 98.4 20 4.06 49 32   L Ulnar - ADM      Wrist 2.76 8.9 100 8   32.5      B.Elbow 5.99 8.9 99.7 18 3.23 56 32.4      A.Elbow 8.02 8.3 93.3 10 2.03 49 32.4       Sensory NCS      Nerve / Sites Onset Lat Peak Lat PP Amp Distance Velocity Temp.    ms ms µV cm m/s °C   L Median - Digit II (Antidromic)      Mid Palm 1.20 2.03 46.3 7 58 32.4      Wrist 3.59 5.16 16.2 14 39 32.4   R Median - Digit II (Antidromic)      Mid Palm 1.30 2.19 56.5 7 54 32      Wrist 2.97 3.85 41.5 14 47 32   L Ulnar - Digit V (Antidromic)      Wrist 2.45 3.13 45.0 14 57 32.4   L Radial - Anatomical snuff box (Forearm)      Forearm 1.67 2.19 16.9 10 60 32.4       Combined Sensory Index      Nerve / Sites Rec. Site Peak Lat NP Amp PP Amp Segments Peak Diff Temp.     ms µV µV  ms °C   R Median - CSI      Median Thumb 3.65 23.4 30.6 Median - Radial 0.47 32      Radial Thumb 3.18 9.9 10.5 Median - Ulnar 0.99 32      Median Ring 4.17 22.3 28.7 Median palm - Ulnar palm        Ulnar Ring 3.18 19.2 56.6         CSI     CSI 1.46            F  Wave      Nerve F Lat M Lat F-M Lat    ms ms ms   L Median - APB 33.4 6.5 26.9   L Ulnar - ADM 27.7 2.8 24.9   R Median - APB 29.9 3.9 26.1         EMG         EMG Summary Table

## 2025-05-20 ENCOUNTER — TREATMENT (OUTPATIENT)
Dept: OCCUPATIONAL THERAPY | Age: 75
End: 2025-05-20
Payer: COMMERCIAL

## 2025-05-20 ENCOUNTER — OFFICE VISIT (OUTPATIENT)
Dept: ORTHOPEDIC SURGERY | Age: 75
End: 2025-05-20
Payer: COMMERCIAL

## 2025-05-20 VITALS
HEART RATE: 76 BPM | TEMPERATURE: 97.8 F | DIASTOLIC BLOOD PRESSURE: 71 MMHG | RESPIRATION RATE: 18 BRPM | OXYGEN SATURATION: 97 % | SYSTOLIC BLOOD PRESSURE: 145 MMHG

## 2025-05-20 DIAGNOSIS — S52.502D CLOSED FRACTURE OF DISTAL END OF LEFT RADIUS WITH ROUTINE HEALING, UNSPECIFIED FRACTURE MORPHOLOGY, SUBSEQUENT ENCOUNTER: ICD-10-CM

## 2025-05-20 DIAGNOSIS — G56.02 LEFT CARPAL TUNNEL SYNDROME: Primary | ICD-10-CM

## 2025-05-20 DIAGNOSIS — M65.312 TRIGGER FINGER OF LEFT THUMB: ICD-10-CM

## 2025-05-20 DIAGNOSIS — S52.502D CLOSED FRACTURE OF DISTAL END OF LEFT RADIUS WITH ROUTINE HEALING, UNSPECIFIED FRACTURE MORPHOLOGY, SUBSEQUENT ENCOUNTER: Primary | ICD-10-CM

## 2025-05-20 PROCEDURE — 99213 OFFICE O/P EST LOW 20 MIN: CPT | Performed by: ORTHOPAEDIC SURGERY

## 2025-05-20 PROCEDURE — 97140 MANUAL THERAPY 1/> REGIONS: CPT | Performed by: OCCUPATIONAL THERAPIST

## 2025-05-20 PROCEDURE — 97110 THERAPEUTIC EXERCISES: CPT | Performed by: OCCUPATIONAL THERAPIST

## 2025-05-20 PROCEDURE — 1123F ACP DISCUSS/DSCN MKR DOCD: CPT | Performed by: ORTHOPAEDIC SURGERY

## 2025-05-20 NOTE — PROGRESS NOTES
OCCUPATIONAL THERAPY DAILY NOTES  [facility] PHYSICIANS [place] SPECIALTY CARE [facility] OCCUPATIONAL THERAPY  [address]  2ND FLOOR  [facility] [address]  Dept: [phone]  Loc: [phone]   [facility] OT Fax: [phone]      Date:  2025    Initial Evaluation Date: 2025                            Evaluating Therapist: Christine Lassiter OT     Patient Name:  Kiersten Mitchell                         :  1950     Restrictions/Precautions:  Follow appropriate protocol, low fall risk  Diagnosis:  S52.502D (ICD-10-CM) - Closed fracture of distal end of left radius with routine healing, unspecified fracture morphology, subsequent encounter  Date of Surgery/Injury: 01/15/2025 ( 12 weeks)     Insurance/Certification information:  [employer]- Medicare  Plan of care signed (Y/N): Electronic  Visit# / total visits: 3 /8     Referring Practitioner:  Darius Fernandez PA  Specific Practitioner Orders: OT 2-3 days a week for 6-8 weeks.  NWB ROC.  Would like for OT to start in 2 weeks.     OT PLAN OF CARE   OT POC based on physician orders, patient diagnosis and results of clinical assessment     Frequency/Duration1-2x / week for 12-18 visits.   Certification period From: 2025 To: 25     GOALS (Long term same as Short term):  1) Patient will demonstrate good understanding of home program with good accuracy.   Progressing, Pt demo's fair understanding of all HEP   2) Patient will demonstrate increased active/passive range of motion of their LUE by atleast 10-20* for ADL/IADL completion.  Progressing, Pt demo's improvement of atleast 10-15* in the wrist. Continued focus on optimal functional motion going forward  3) Patient will demonstrate increased /pinch strength of at least 10 / 3-5 pinch pounds of their L hand.   Progressing, continued limitations with LUE as well as pain with resistance however increased by 5# with  and 1# with pinch  4) Patient to

## 2025-05-20 NOTE — PROGRESS NOTES
Chief Complaint   Patient presents with    Finger Pain     FU after injection left thumb trigger finger.  No longer triggering    Hand Pain     Pain, numbness  and tingling left hand continues. EMG done last week    Other     Review Dexascan results       SUBJECTIVE: Patient is here for follow-up today approximately a month out from an injection for left trigger finger of her thumb.  She also had nonoperative distal radius which was healed.  She is doing better as far as occupational therapy.  She still has some stiffness although she states she is improving.  She still has carpal tunnel syndromes recently had an EMG showing moderate carpal tunnel on her left side and mild on the right.  She has a history of hand surgery on the right in the past.  She would like to go potentially to a hand surgeon here when he starts in September.  She would like to wait into the fall to have carpal tunnel procedure performed.  I did offer her injection today she would like to hold off.  She stated that the thumb triggering has completely stopped since the injection I performed at her last visit.  I am going to see her back in approximately 3 months to discuss her symptoms once again she is agreeable.  I stated in the meantime of her carpal tunnel will get worse she is welcome to have an injection performed a bit to get her through symptomatically or even a carpal tunnel release she would like to hold off on both of those for now which I think is very reasonable.  She denies any recent falls or injuries          Past Medical History:   Diagnosis Date    Actinic keratosis     Diabetes (HCC)     Early dry stage nonexudative age-related macular degeneration     Hearing loss     Hyperlipidemia     Hypothyroidism     Lichen sclerosus     Neuropathy     Prolonged emergence from general anesthesia     Rotator cuff impingement syndrome     Spinal arthritis     Spinal stenosis     Trigger finger of right thumb     Type 2 diabetes mellitus

## 2025-05-28 ENCOUNTER — TREATMENT (OUTPATIENT)
Dept: OCCUPATIONAL THERAPY | Age: 75
End: 2025-05-28
Payer: COMMERCIAL

## 2025-05-28 ENCOUNTER — OFFICE VISIT (OUTPATIENT)
Dept: GASTROENTEROLOGY | Age: 75
End: 2025-05-28
Payer: COMMERCIAL

## 2025-05-28 VITALS
HEIGHT: 66 IN | WEIGHT: 148.8 LBS | SYSTOLIC BLOOD PRESSURE: 126 MMHG | RESPIRATION RATE: 18 BRPM | DIASTOLIC BLOOD PRESSURE: 60 MMHG | HEART RATE: 64 BPM | TEMPERATURE: 97.5 F | OXYGEN SATURATION: 98 % | BODY MASS INDEX: 23.91 KG/M2

## 2025-05-28 DIAGNOSIS — K58.2 IRRITABLE BOWEL SYNDROME WITH BOTH CONSTIPATION AND DIARRHEA: Primary | ICD-10-CM

## 2025-05-28 DIAGNOSIS — S52.502D CLOSED FRACTURE OF DISTAL END OF LEFT RADIUS WITH ROUTINE HEALING, UNSPECIFIED FRACTURE MORPHOLOGY, SUBSEQUENT ENCOUNTER: Primary | ICD-10-CM

## 2025-05-28 PROCEDURE — 99213 OFFICE O/P EST LOW 20 MIN: CPT | Performed by: STUDENT IN AN ORGANIZED HEALTH CARE EDUCATION/TRAINING PROGRAM

## 2025-05-28 PROCEDURE — 97110 THERAPEUTIC EXERCISES: CPT | Performed by: OCCUPATIONAL THERAPIST

## 2025-05-28 PROCEDURE — 1123F ACP DISCUSS/DSCN MKR DOCD: CPT | Performed by: STUDENT IN AN ORGANIZED HEALTH CARE EDUCATION/TRAINING PROGRAM

## 2025-05-28 NOTE — PROGRESS NOTES
OCCUPATIONAL THERAPY PROGRESS UPDATE/PT ON HOLD  [facility] PHYSICIANS [place] [facility] OCCUPATIONAL THERAPY  [address]  [address] FLO[address]  [facility] [address]  Dept: [phone]  Loc: [phone]   ABRAHAM Callejaswn OT Fax: [phone]      Date:  2025    Initial Evaluation Date: 2025                            Evaluating Therapist: Christine Lassiter OT     Patient Name:  Kiersten Mitchell                         :  1950     Restrictions/Precautions:  Follow appropriate protocol, low fall risk  Diagnosis:  S52.502D (ICD-10-CM) - Closed fracture of distal end of left radius with routine healing, unspecified fracture morphology, subsequent encounter  Date of Surgery/Injury: 01/15/2025 ( 12 weeks)     Insurance/Certification information:  [employer]- Medicare  Plan of care signed (Y/N): Electronic  Visit# / total visits:      Referring Practitioner:  Darius Fernandez PA  Specific Practitioner Orders: OT 2-3 days a week for 6-8 weeks.  NWVIOLA BRIAN.  Would like for OT to start in 2 weeks.     OT PLAN OF CARE   OT POC based on physician orders, patient diagnosis and results of clinical assessment     Frequency/Duration1-2x / week for 12-18 visits.   Certification period From: 2025 To: 25     GOALS (Long term same as Short term):  1) Patient will demonstrate good understanding of home program with good accuracy.   Met, Pt demo's fair understanding of all HEP with strengthening HEP added  2) Patient will demonstrate increased active/passive range of motion of their LUE by atleast 10-20* for ADL/IADL completion.  Met, Pt demo's improvement by atleast 10* in all planes since initial evaluation. Pt demo's functional motion in all planes  3) Patient will demonstrate increased /pinch strength of at least 10 / 3-5 pinch pounds of their L hand.   Progressing, continued limitations with LUE as well as pain with resistance however increased by 5# with  and

## 2025-05-28 NOTE — PROGRESS NOTES
Gastroenterology, Hepatology, &  Advanced Endoscopy    Progress Note        HPI:     She had a fall with fractured wrist. Splinted. Found to be osteopenic. Placed on calcium.    Calcium and fiber have improved stool consistency.  Frequency remains irregular.      No results found for: \"INR\", \"PROTIME\"      ALT   Date Value Ref Range Status   11/29/2024 14 0 - 32 U/L Final   11/15/2024 14 0 - 32 U/L Final   10/16/2024 14 0 - 32 U/L Final     AST   Date Value Ref Range Status   11/29/2024 18 0 - 31 U/L Final   11/15/2024 20 0 - 31 U/L Final   10/16/2024 20 0 - 31 U/L Final     Alkaline Phosphatase   Date Value Ref Range Status   11/29/2024 71 35 - 104 U/L Final   11/15/2024 75 35 - 104 U/L Final   10/16/2024 82 35 - 104 U/L Final     Total Bilirubin   Date Value Ref Range Status   11/29/2024 0.2 0.0 - 1.2 mg/dL Final   11/15/2024 0.3 0.0 - 1.2 mg/dL Final   10/16/2024 <0.2 0.0 - 1.2 mg/dL Final      Lab Results   Component Value Date    WBC 5.1 11/15/2024    HGB 13.5 11/15/2024    HCT 40.5 11/15/2024     11/15/2024     11/29/2024    K 4.6 11/29/2024     11/29/2024    CREATININE 1.1 (H) 11/29/2024    BUN 20 11/29/2024    CO2 28 11/29/2024    FOLATE >20.0 09/07/2016    [id number] 1587 (H) 09/07/2016    GLUCOSE 101 (H) 11/29/2024    TSH 5.31 (H) 11/29/2024    LABA1C 6.1 03/20/2025     Computed MELD 3.0 unavailable. One or more values for this score either were not found within the given timeframe or did not fit some other criterion.  Computed MELD-Na unavailable. One or more values for this score either were not found within the given timeframe or did not fit some other criterion.     No results found for: \"CEA\", \"SEDRATE\", \"LIPASE\", \"\", \"CHROMGRNA\"       US Result (most recent):  US ABDOMEN COMPLETE 05/03/2022    Narrative  EXAMINATION:  COMPLETE ABDOMINAL ULTRASOUND 5/3/2022 10:18 am    COMPARISON:  None.    HISTORY:  ORDERING SYSTEM PROVIDED HISTORY: Change in bowel habit  TECHNOLOGIST

## 2025-06-14 ENCOUNTER — HOSPITAL ENCOUNTER (OUTPATIENT)
Age: 75
Discharge: HOME | End: 2025-06-14
Payer: COMMERCIAL

## 2025-06-14 LAB
ALBUMIN SERPL-MCNC: 4.1 G/DL (ref 3.5–5.2)
ALP SERPL-CCNC: 70 U/L (ref 35–104)
ALT SERPL-CCNC: <5 U/L (ref 0–32)
ANION GAP SERPL CALCULATED.3IONS-SCNC: 10 MMOL/L (ref 7–16)
AST SERPL-CCNC: 17 U/L (ref 0–31)
BILIRUB SERPL-MCNC: 0.3 MG/DL (ref 0–1.2)
BUN SERPL-MCNC: 18 MG/DL (ref 6–23)
CALCIUM SERPL-MCNC: 9.6 MG/DL (ref 8.6–10.2)
CHLORIDE SERPL-SCNC: 105 MMOL/L (ref 98–107)
CHOLEST SERPL-MCNC: 186 MG/DL
CO2 SERPL-SCNC: 26 MMOL/L (ref 22–29)
CREAT SERPL-MCNC: 1 MG/DL (ref 0.5–1)
CREAT UR-MCNC: 90.1 MG/DL (ref 29–226)
GFR, ESTIMATED: 59 ML/MIN/1.73M2
GLUCOSE SERPL-MCNC: 112 MG/DL (ref 74–99)
HBA1C MFR BLD: 6.4 % (ref 4–5.6)
HDLC SERPL-MCNC: 79 MG/DL
LDLC SERPL CALC-MCNC: 94 MG/DL
MICROALBUMIN UR-MCNC: <12 MG/L (ref 0–20)
MICROALBUMIN/CREAT UR-RTO: <13 MCG/MG CREAT (ref 0–30)
POTASSIUM SERPL-SCNC: 4.3 MMOL/L (ref 3.5–5)
PROT SERPL-MCNC: 6.7 G/DL (ref 6.4–8.3)
SODIUM SERPL-SCNC: 141 MMOL/L (ref 132–146)
T4 FREE SERPL-MCNC: 1.5 NG/DL (ref 0.9–1.7)
TRIGL SERPL-MCNC: 66 MG/DL
TSH SERPL DL<=0.05 MIU/L-ACNC: 2.05 UIU/ML (ref 0.27–4.2)
VLDLC SERPL CALC-MCNC: 13 MG/DL

## 2025-06-14 PROCEDURE — 36415 COLL VENOUS BLD VENIPUNCTURE: CPT

## 2025-06-14 PROCEDURE — 82570 ASSAY OF URINE CREATININE: CPT

## 2025-06-14 PROCEDURE — 84443 ASSAY THYROID STIM HORMONE: CPT

## 2025-06-14 PROCEDURE — 82043 UR ALBUMIN QUANTITATIVE: CPT

## 2025-06-14 PROCEDURE — 84439 ASSAY OF FREE THYROXINE: CPT

## 2025-06-14 PROCEDURE — 80061 LIPID PANEL: CPT

## 2025-06-14 PROCEDURE — 80053 COMPREHEN METABOLIC PANEL: CPT

## 2025-06-14 PROCEDURE — 83036 HEMOGLOBIN GLYCOSYLATED A1C: CPT

## 2025-06-26 LAB — MAMMOGRAPHY, EXTERNAL: NEGATIVE

## (undated) DEVICE — DEFENDO AIR WATER SUCTION AND BIOPSY VALVE KIT FOR  OLYMPUS: Brand: DEFENDO AIR/WATER/SUCTION AND BIOPSY VALVE

## (undated) DEVICE — TRAP POLYP ETRAP

## (undated) DEVICE — CANNULA NSL ORAL AD FOR CAPNOFLEX CO2 O2 AIRLFE

## (undated) DEVICE — CONNECTOR TBNG AUX H2O JET DISP FOR OLY 160/180 SER

## (undated) DEVICE — GAUZE,SPONGE,POST-OP,4X3,STRL,LF: Brand: MEDLINE

## (undated) DEVICE — GRADUATE TRIANG MEASURE 1000ML BLK PRNT

## (undated) DEVICE — FORCEPS BX L240CM JAW DIA2.4MM ORNG L CAP W/ NDL DISP RAD

## (undated) DEVICE — SNARE ENDOSCP POLYP 2.4 MM 240 CM 10 MM 2.8 MM CAPTIVATOR

## (undated) DEVICE — SPONGE GZ W4XL4IN RAYON POLY FILL CVR W/ NONWOVEN FAB